# Patient Record
Sex: MALE | Race: WHITE | NOT HISPANIC OR LATINO | Employment: OTHER | URBAN - METROPOLITAN AREA
[De-identification: names, ages, dates, MRNs, and addresses within clinical notes are randomized per-mention and may not be internally consistent; named-entity substitution may affect disease eponyms.]

---

## 2017-01-18 ENCOUNTER — TRANSFERRED RECORDS (OUTPATIENT)
Dept: OTHER | Facility: CLINIC | Age: 67
End: 2017-01-18

## 2017-01-19 ENCOUNTER — HOSPITAL ENCOUNTER (OUTPATIENT)
Dept: ULTRASOUND IMAGING | Facility: CLINIC | Age: 67
Discharge: HOME | End: 2017-01-19
Attending: ORTHOPAEDIC SURGERY | Admitting: ORTHOPAEDIC SURGERY
Payer: MEDICARE

## 2017-01-19 DIAGNOSIS — L81.9 DISCOLORATION OF SKIN OF FOOT: ICD-10-CM

## 2017-01-19 DIAGNOSIS — I73.9 CLAUDICATION (H): ICD-10-CM

## 2017-01-19 DIAGNOSIS — M79.673 FOOT PAIN: ICD-10-CM

## 2017-01-19 PROCEDURE — 93924 LWR XTR VASC STDY BILAT: CPT

## 2017-01-25 ENCOUNTER — TRANSFERRED RECORDS (OUTPATIENT)
Dept: HEALTH INFORMATION MANAGEMENT | Facility: CLINIC | Age: 67
End: 2017-01-25

## 2017-01-30 ENCOUNTER — ANESTHESIA EVENT (OUTPATIENT)
Dept: SURGERY | Facility: CLINIC | Age: 67
DRG: 470 | End: 2017-01-30
Payer: MEDICARE

## 2017-01-30 ENCOUNTER — ANESTHESIA (OUTPATIENT)
Dept: SURGERY | Facility: CLINIC | Age: 67
DRG: 470 | End: 2017-01-30
Payer: MEDICARE

## 2017-01-30 PROBLEM — Z96.651 S/P TOTAL KNEE REPLACEMENT USING CEMENT, RIGHT: Status: ACTIVE | Noted: 2017-01-30

## 2017-01-30 PROCEDURE — 25000128 H RX IP 250 OP 636: Performed by: ANESTHESIOLOGY

## 2017-01-30 PROCEDURE — 25000125 ZZHC RX 250: Performed by: NURSE ANESTHETIST, CERTIFIED REGISTERED

## 2017-01-30 PROCEDURE — 25000128 H RX IP 250 OP 636: Performed by: NURSE ANESTHETIST, CERTIFIED REGISTERED

## 2017-01-30 PROCEDURE — 25000125 ZZHC RX 250: Performed by: ANESTHESIOLOGY

## 2017-01-30 PROCEDURE — 25000128 H RX IP 250 OP 636: Performed by: ORTHOPAEDIC SURGERY

## 2017-01-30 PROCEDURE — 25800025 ZZH RX 258: Performed by: ANESTHESIOLOGY

## 2017-01-30 RX ORDER — PROPOFOL 10 MG/ML
INJECTION, EMULSION INTRAVENOUS PRN
Status: DISCONTINUED | OUTPATIENT
Start: 2017-01-30 | End: 2017-01-30

## 2017-01-30 RX ORDER — LIDOCAINE HYDROCHLORIDE 20 MG/ML
INJECTION, SOLUTION INFILTRATION; PERINEURAL PRN
Status: DISCONTINUED | OUTPATIENT
Start: 2017-01-30 | End: 2017-01-30

## 2017-01-30 RX ORDER — NEOSTIGMINE METHYLSULFATE 1 MG/ML
VIAL (ML) INJECTION PRN
Status: DISCONTINUED | OUTPATIENT
Start: 2017-01-30 | End: 2017-01-30

## 2017-01-30 RX ORDER — ONDANSETRON 2 MG/ML
INJECTION INTRAMUSCULAR; INTRAVENOUS PRN
Status: DISCONTINUED | OUTPATIENT
Start: 2017-01-30 | End: 2017-01-30

## 2017-01-30 RX ORDER — GLYCOPYRROLATE 0.2 MG/ML
INJECTION, SOLUTION INTRAMUSCULAR; INTRAVENOUS PRN
Status: DISCONTINUED | OUTPATIENT
Start: 2017-01-30 | End: 2017-01-30

## 2017-01-30 RX ADMIN — PROPOFOL 20 MG: 10 INJECTION, EMULSION INTRAVENOUS at 14:17

## 2017-01-30 RX ADMIN — ONDANSETRON 4 MG: 2 INJECTION INTRAMUSCULAR; INTRAVENOUS at 14:12

## 2017-01-30 RX ADMIN — NEOSTIGMINE METHYLSULFATE 5 MG: 1 INJECTION INTRAMUSCULAR; INTRAVENOUS; SUBCUTANEOUS at 14:12

## 2017-01-30 RX ADMIN — DEXMEDETOMIDINE 8 MCG: 100 INJECTION, SOLUTION, CONCENTRATE INTRAVENOUS at 14:09

## 2017-01-30 RX ADMIN — FENTANYL CITRATE 100 MCG: 50 INJECTION, SOLUTION INTRAMUSCULAR; INTRAVENOUS at 12:37

## 2017-01-30 RX ADMIN — LIDOCAINE HYDROCHLORIDE 80 MG: 20 INJECTION, SOLUTION INFILTRATION; PERINEURAL at 12:37

## 2017-01-30 RX ADMIN — SODIUM CHLORIDE, POTASSIUM CHLORIDE, SODIUM LACTATE AND CALCIUM CHLORIDE: 600; 310; 30; 20 INJECTION, SOLUTION INTRAVENOUS at 11:17

## 2017-01-30 RX ADMIN — ROCURONIUM BROMIDE 50 MG: 10 INJECTION INTRAVENOUS at 12:38

## 2017-01-30 RX ADMIN — PROPOFOL 200 MG: 10 INJECTION, EMULSION INTRAVENOUS at 12:37

## 2017-01-30 RX ADMIN — Medication 3 G: at 12:45

## 2017-01-30 RX ADMIN — HYDROMORPHONE HYDROCHLORIDE 0.5 MG: 1 INJECTION, SOLUTION INTRAMUSCULAR; INTRAVENOUS; SUBCUTANEOUS at 13:09

## 2017-01-30 RX ADMIN — ROPIVACAINE HYDROCHLORIDE 30 ML GIVEN: 5 INJECTION, SOLUTION EPIDURAL; INFILTRATION; PERINEURAL at 11:11

## 2017-01-30 RX ADMIN — MIDAZOLAM HYDROCHLORIDE 1 MG: 1 INJECTION, SOLUTION INTRAMUSCULAR; INTRAVENOUS at 12:30

## 2017-01-30 RX ADMIN — HYDROMORPHONE HYDROCHLORIDE 0.5 MG: 1 INJECTION, SOLUTION INTRAMUSCULAR; INTRAVENOUS; SUBCUTANEOUS at 13:29

## 2017-01-30 RX ADMIN — GLYCOPYRROLATE 1 MG: 0.2 INJECTION, SOLUTION INTRAMUSCULAR; INTRAVENOUS at 14:11

## 2017-01-30 NOTE — ANESTHESIA PROCEDURE NOTES
Peripheral nerve/Neuraxial procedure note : femoral  Pre-Procedure  Performed by SHARMIN MIDDLETON  Referred by [facility]  Location: pre-op      Pre-Anesthestic Checklist: patient identified, IV checked, site marked, risks and benefits discussed, informed consent, monitors and equipment checked, at physician/surgeon's request and post-op pain management    Timeout  Correct Patient: Yes   Correct Procedure: Yes   Correct Site: Yes   Correct Laterality: Yes   Correct Position: Yes   Site Marked: Yes   .   Procedure Documentation    .    Procedure:    Femoral.  Local skin infiltrated with 3 mL of 1% lidocaine.     Ultrasound used to identify targeted nerve, plexus, or vascular marker and placed a needle adjacent to it. A permanent image is entered into the patient's record.  Patient Prep;mask, sterile gloves, chlorhexidine gluconate and isopropyl alcohol, patient draped.  Nerve Stim: Initial Level 1 mA. Lowest motor response mA..  Needle: insulated, short bevel (20 G. 2 in). .  Spinal Needle: . . .     Assessment/Narrative  Paresthesias: No.  .  The placement was negative for: blood aspirated, painful injection and site bleeding.  Bolus given via needle..   Secured via.   Complications: none. Comments:  Single shot femoral nerve block (adductor)  30 ml 0.5% Ropivacaine with 1:400,000 Epinephrine

## 2017-01-30 NOTE — ANESTHESIA CARE TRANSFER NOTE
Patient: Huey Lucas    ARTHROPLASTY KNEE (Right Knee)  Additional InformationProcedure(s):  RIGHT TOTAL KNEE ARTHROPLASTY  - Wound Class: I-Clean    Diagnosis: RIGHT KNEE OSTEOARTHRITIS   Diagnosis Additional Information: No value filed.    Anesthesia Type:   General, ETT     Note:  Airway :Face Mask  Patient transferred to:PACU  Comments: Spontaneous respirations, tidal volume > 500, oxygen saturation > 97%, TOF 4/4 with > 5 seconds sustained tetany, follows commands.  Suctioned and extubated with cuff down to facemask.  Exchanging well.Transferred to PACU, spontaneous respirations, 10L oxygen via facemask.  All monitors and alarms on and functioning, VSS.  Patient awake, comfortable.  Report to PACU RN.      Vitals: (Last set prior to Anesthesia Care Transfer)              Electronically Signed By: OFELIA Mcdermott CRNA  January 30, 2017  2:30 PM

## 2017-01-30 NOTE — ANESTHESIA PREPROCEDURE EVALUATION
Anesthesia Evaluation     . Pt has had prior anesthetic.     No history of anesthetic complications     ROS/MED HX    ENT/Pulmonary:     (+)sleep apnea, doesn't use CPAP , . .    Neurologic: Comment: Pituitary cyst - resolving, asx      Cardiovascular: Comment: S/p [place] 2009  Mild pulmonary HTN    (+) Dyslipidemia, hypertension----. : . . . :. . pulmonary hypertension,       METS/Exercise Tolerance:  3 - Able to walk 1-2 blocks without stopping   Hematologic:         Musculoskeletal:         GI/Hepatic:        (-) GERD   Renal/Genitourinary:     (+) chronic renal disease, type: CRI, Pt does not require dialysis,       Endo:     (+) type I DM, Using insulin Obesity, .      Psychiatric:     (+) psychiatric history anxiety and depression      Infectious Disease:         Malignancy:         Other:               Physical Exam  Normal systems: dental    Airway   Mallampati: III  TM distance: >3 FB  Neck ROM: full    Dental     Cardiovascular   Rhythm and rate: regular and normal      Pulmonary    breath sounds clear to auscultation                    Anesthesia Plan      History & Physical Review  History and physical reviewed and following examination; no interval change.    ASA Status:  3 .    NPO Status:  > 8 hours    Plan for General and ETT with Intravenous induction. Maintenance will be Balanced.    PONV prophylaxis:  Ondansetron (or other 5HT-3) and Dexamethasone or Solumedrol  Additional equipment: Videolaryngoscope glidescope  glucometer      Postoperative Care  Postoperative pain management:  Peripheral nerve block (Single Shot).      Consents  Anesthetic plan, risks, benefits and alternatives discussed with:  Patient..                          .

## 2017-01-30 NOTE — ANESTHESIA POSTPROCEDURE EVALUATION
Patient: Huey Lucas    ARTHROPLASTY KNEE (Right Knee)  Additional InformationProcedure(s):  RIGHT TOTAL KNEE ARTHROPLASTY  - Wound Class: I-Clean    Diagnosis:RIGHT KNEE OSTEOARTHRITIS   Diagnosis Additional Information: No value filed.    Anesthesia Type:  General, ETT    Note:  Anesthesia Post Evaluation    Patient location during evaluation: PACU  Patient participation: Able to fully participate in evaluation  Level of consciousness: awake  Pain management: adequate  Airway patency: patent  Cardiovascular status: acceptable  Respiratory status: acceptable  Hydration status: acceptable  PONV: none     Anesthetic complications: None          Last vitals:  Filed Vitals:    01/30/17 1430 01/30/17 1440 01/30/17 1450   BP: 127/72 114/72 122/76   Temp:      Resp: 26 22 18   SpO2: 100% 99% 99%       Electronically Signed By: Adrienne Sales MD  January 30, 2017  2:58 PM

## 2017-01-31 ENCOUNTER — APPOINTMENT (OUTPATIENT)
Dept: PHYSICAL THERAPY | Facility: CLINIC | Age: 67
DRG: 470 | End: 2017-01-31
Attending: ORTHOPAEDIC SURGERY
Payer: MEDICARE

## 2017-02-01 ENCOUNTER — APPOINTMENT (OUTPATIENT)
Dept: PHYSICAL THERAPY | Facility: CLINIC | Age: 67
DRG: 470 | End: 2017-02-01
Attending: ORTHOPAEDIC SURGERY
Payer: MEDICARE

## 2017-02-01 ENCOUNTER — APPOINTMENT (OUTPATIENT)
Dept: OCCUPATIONAL THERAPY | Facility: CLINIC | Age: 67
DRG: 470 | End: 2017-02-01
Attending: PHYSICIAN ASSISTANT
Payer: MEDICARE

## 2017-02-02 ENCOUNTER — APPOINTMENT (OUTPATIENT)
Dept: PHYSICAL THERAPY | Facility: CLINIC | Age: 67
DRG: 470 | End: 2017-02-02
Attending: ORTHOPAEDIC SURGERY
Payer: MEDICARE

## 2017-02-02 ENCOUNTER — APPOINTMENT (OUTPATIENT)
Dept: OCCUPATIONAL THERAPY | Facility: CLINIC | Age: 67
DRG: 470 | End: 2017-02-02
Attending: ORTHOPAEDIC SURGERY
Payer: MEDICARE

## 2017-10-09 ENCOUNTER — HOSPITAL ENCOUNTER (OUTPATIENT)
Facility: CLINIC | Age: 67
Discharge: HOME | End: 2017-10-09
Attending: ORTHOPAEDIC SURGERY | Admitting: ORTHOPAEDIC SURGERY
Payer: MEDICARE

## 2017-10-09 ENCOUNTER — SURGERY (OUTPATIENT)
Age: 67
End: 2017-10-09

## 2017-10-09 ENCOUNTER — ANESTHESIA (OUTPATIENT)
Dept: SURGERY | Facility: CLINIC | Age: 67
End: 2017-10-09
Payer: MEDICARE

## 2017-10-09 ENCOUNTER — ANESTHESIA EVENT (OUTPATIENT)
Dept: SURGERY | Facility: CLINIC | Age: 67
End: 2017-10-09
Payer: MEDICARE

## 2017-10-09 VITALS
DIASTOLIC BLOOD PRESSURE: 59 MMHG | HEIGHT: 72 IN | OXYGEN SATURATION: 96 % | BODY MASS INDEX: 34.05 KG/M2 | WEIGHT: 251.4 LBS | HEART RATE: 75 BPM | TEMPERATURE: 97.4 F | SYSTOLIC BLOOD PRESSURE: 116 MMHG | RESPIRATION RATE: 16 BRPM

## 2017-10-09 DIAGNOSIS — Z98.890 S/P FOOT SURGERY: Primary | ICD-10-CM

## 2017-10-09 LAB
GLUCOSE BLDC GLUCOMTR-MCNC: 131 MG/DL (ref 70–99)
GLUCOSE BLDC GLUCOMTR-MCNC: 216 MG/DL (ref 70–99)

## 2017-10-09 PROCEDURE — A9270 NON-COVERED ITEM OR SERVICE: HCPCS | Mod: GY | Performed by: PHYSICIAN ASSISTANT

## 2017-10-09 PROCEDURE — 36000050 ZZH SURGERY LEVEL 2 1ST 30 MIN: Performed by: ORTHOPAEDIC SURGERY

## 2017-10-09 PROCEDURE — 71000027 ZZH RECOVERY PHASE 2 EACH 15 MINS: Performed by: ORTHOPAEDIC SURGERY

## 2017-10-09 PROCEDURE — 25000128 H RX IP 250 OP 636: Performed by: NURSE ANESTHETIST, CERTIFIED REGISTERED

## 2017-10-09 PROCEDURE — 71000013 ZZH RECOVERY PHASE 1 LEVEL 1 EA ADDTL HR: Performed by: ORTHOPAEDIC SURGERY

## 2017-10-09 PROCEDURE — 25000128 H RX IP 250 OP 636: Performed by: ANESTHESIOLOGY

## 2017-10-09 PROCEDURE — 27210995 ZZH RX 272: Performed by: ORTHOPAEDIC SURGERY

## 2017-10-09 PROCEDURE — 25000128 H RX IP 250 OP 636: Performed by: PHYSICIAN ASSISTANT

## 2017-10-09 PROCEDURE — 40000170 ZZH STATISTIC PRE-PROCEDURE ASSESSMENT II: Performed by: ORTHOPAEDIC SURGERY

## 2017-10-09 PROCEDURE — 27210794 ZZH OR GENERAL SUPPLY STERILE: Performed by: ORTHOPAEDIC SURGERY

## 2017-10-09 PROCEDURE — 36000052 ZZH SURGERY LEVEL 2 EA 15 ADDTL MIN: Performed by: ORTHOPAEDIC SURGERY

## 2017-10-09 PROCEDURE — 71000012 ZZH RECOVERY PHASE 1 LEVEL 1 FIRST HR: Performed by: ORTHOPAEDIC SURGERY

## 2017-10-09 PROCEDURE — 25000125 ZZHC RX 250: Performed by: NURSE ANESTHETIST, CERTIFIED REGISTERED

## 2017-10-09 PROCEDURE — 37000008 ZZH ANESTHESIA TECHNICAL FEE, 1ST 30 MIN: Performed by: ORTHOPAEDIC SURGERY

## 2017-10-09 PROCEDURE — 37000009 ZZH ANESTHESIA TECHNICAL FEE, EACH ADDTL 15 MIN: Performed by: ORTHOPAEDIC SURGERY

## 2017-10-09 PROCEDURE — 27110028 ZZH OR GENERAL SUPPLY NON-STERILE: Performed by: ORTHOPAEDIC SURGERY

## 2017-10-09 PROCEDURE — 25000566 ZZH SEVOFLURANE, EA 15 MIN: Performed by: ORTHOPAEDIC SURGERY

## 2017-10-09 PROCEDURE — 82962 GLUCOSE BLOOD TEST: CPT | Mod: 91

## 2017-10-09 PROCEDURE — 25000132 ZZH RX MED GY IP 250 OP 250 PS 637: Mod: GY | Performed by: PHYSICIAN ASSISTANT

## 2017-10-09 RX ORDER — NALOXONE HYDROCHLORIDE 0.4 MG/ML
.1-.4 INJECTION, SOLUTION INTRAMUSCULAR; INTRAVENOUS; SUBCUTANEOUS
Status: DISCONTINUED | OUTPATIENT
Start: 2017-10-09 | End: 2017-10-09 | Stop reason: HOSPADM

## 2017-10-09 RX ORDER — SODIUM CHLORIDE, SODIUM LACTATE, POTASSIUM CHLORIDE, CALCIUM CHLORIDE 600; 310; 30; 20 MG/100ML; MG/100ML; MG/100ML; MG/100ML
INJECTION, SOLUTION INTRAVENOUS CONTINUOUS PRN
Status: DISCONTINUED | OUTPATIENT
Start: 2017-10-09 | End: 2017-10-09

## 2017-10-09 RX ORDER — HYDROMORPHONE HYDROCHLORIDE 1 MG/ML
.3-.5 INJECTION, SOLUTION INTRAMUSCULAR; INTRAVENOUS; SUBCUTANEOUS EVERY 10 MIN PRN
Status: DISCONTINUED | OUTPATIENT
Start: 2017-10-09 | End: 2017-10-09 | Stop reason: HOSPADM

## 2017-10-09 RX ORDER — ONDANSETRON 2 MG/ML
INJECTION INTRAMUSCULAR; INTRAVENOUS PRN
Status: DISCONTINUED | OUTPATIENT
Start: 2017-10-09 | End: 2017-10-09

## 2017-10-09 RX ORDER — CEFAZOLIN SODIUM 1 G/3ML
1 INJECTION, POWDER, FOR SOLUTION INTRAMUSCULAR; INTRAVENOUS SEE ADMIN INSTRUCTIONS
Status: DISCONTINUED | OUTPATIENT
Start: 2017-10-09 | End: 2017-10-09 | Stop reason: HOSPADM

## 2017-10-09 RX ORDER — FENTANYL CITRATE 50 UG/ML
INJECTION, SOLUTION INTRAMUSCULAR; INTRAVENOUS PRN
Status: DISCONTINUED | OUTPATIENT
Start: 2017-10-09 | End: 2017-10-09

## 2017-10-09 RX ORDER — ROPIVACAINE HYDROCHLORIDE 5 MG/ML
INJECTION, SOLUTION EPIDURAL; INFILTRATION; PERINEURAL PRN
Status: DISCONTINUED | OUTPATIENT
Start: 2017-10-09 | End: 2017-10-09

## 2017-10-09 RX ORDER — IBUPROFEN 600 MG/1
600 TABLET, FILM COATED ORAL EVERY 6 HOURS PRN
Qty: 30 TABLET | Refills: 0 | Status: ON HOLD | OUTPATIENT
Start: 2017-10-09 | End: 2019-03-04

## 2017-10-09 RX ORDER — PHYSOSTIGMINE SALICYLATE 1 MG/ML
1.2 INJECTION INTRAVENOUS
Status: DISCONTINUED | OUTPATIENT
Start: 2017-10-09 | End: 2017-10-09 | Stop reason: HOSPADM

## 2017-10-09 RX ORDER — CLINDAMYCIN HCL 150 MG
150 CAPSULE ORAL 3 TIMES DAILY
Qty: 30 CAPSULE | Refills: 0 | Status: ON HOLD | OUTPATIENT
Start: 2017-10-09 | End: 2019-03-04

## 2017-10-09 RX ORDER — ONDANSETRON 2 MG/ML
4 INJECTION INTRAMUSCULAR; INTRAVENOUS EVERY 30 MIN PRN
Status: DISCONTINUED | OUTPATIENT
Start: 2017-10-09 | End: 2017-10-09 | Stop reason: HOSPADM

## 2017-10-09 RX ORDER — FENTANYL CITRATE 50 UG/ML
25-50 INJECTION, SOLUTION INTRAMUSCULAR; INTRAVENOUS
Status: DISCONTINUED | OUTPATIENT
Start: 2017-10-09 | End: 2017-10-09 | Stop reason: HOSPADM

## 2017-10-09 RX ORDER — ONDANSETRON 4 MG/1
4 TABLET, ORALLY DISINTEGRATING ORAL EVERY 30 MIN PRN
Status: DISCONTINUED | OUTPATIENT
Start: 2017-10-09 | End: 2017-10-09 | Stop reason: HOSPADM

## 2017-10-09 RX ORDER — OXYCODONE AND ACETAMINOPHEN 10; 325 MG/1; MG/1
1-2 TABLET ORAL
Status: COMPLETED | OUTPATIENT
Start: 2017-10-09 | End: 2017-10-09

## 2017-10-09 RX ORDER — CEFAZOLIN SODIUM 2 G/100ML
2 INJECTION, SOLUTION INTRAVENOUS
Status: COMPLETED | OUTPATIENT
Start: 2017-10-09 | End: 2017-10-09

## 2017-10-09 RX ORDER — SODIUM CHLORIDE, SODIUM LACTATE, POTASSIUM CHLORIDE, CALCIUM CHLORIDE 600; 310; 30; 20 MG/100ML; MG/100ML; MG/100ML; MG/100ML
INJECTION, SOLUTION INTRAVENOUS CONTINUOUS
Status: DISCONTINUED | OUTPATIENT
Start: 2017-10-09 | End: 2017-10-09 | Stop reason: HOSPADM

## 2017-10-09 RX ORDER — LIDOCAINE HYDROCHLORIDE 20 MG/ML
INJECTION, SOLUTION INFILTRATION; PERINEURAL PRN
Status: DISCONTINUED | OUTPATIENT
Start: 2017-10-09 | End: 2017-10-09

## 2017-10-09 RX ORDER — ONDANSETRON 4 MG/1
4 TABLET, ORALLY DISINTEGRATING ORAL
Status: DISCONTINUED | OUTPATIENT
Start: 2017-10-09 | End: 2017-10-09 | Stop reason: HOSPADM

## 2017-10-09 RX ORDER — OXYCODONE AND ACETAMINOPHEN 10; 325 MG/1; MG/1
1-2 TABLET ORAL EVERY 4 HOURS PRN
Qty: 30 TABLET | Refills: 0 | Status: ON HOLD | OUTPATIENT
Start: 2017-10-09 | End: 2019-03-04

## 2017-10-09 RX ORDER — FENTANYL CITRATE 50 UG/ML
25-50 INJECTION, SOLUTION INTRAMUSCULAR; INTRAVENOUS EVERY 5 MIN PRN
Status: DISCONTINUED | OUTPATIENT
Start: 2017-10-09 | End: 2017-10-09 | Stop reason: HOSPADM

## 2017-10-09 RX ORDER — PROPOFOL 10 MG/ML
INJECTION, EMULSION INTRAVENOUS PRN
Status: DISCONTINUED | OUTPATIENT
Start: 2017-10-09 | End: 2017-10-09

## 2017-10-09 RX ORDER — KETOROLAC TROMETHAMINE 10 MG/1
10 TABLET, FILM COATED ORAL 3 TIMES DAILY
Qty: 15 TABLET | Refills: 0 | Status: ON HOLD | OUTPATIENT
Start: 2017-10-09 | End: 2019-03-04

## 2017-10-09 RX ORDER — MAGNESIUM HYDROXIDE 1200 MG/15ML
LIQUID ORAL PRN
Status: DISCONTINUED | OUTPATIENT
Start: 2017-10-09 | End: 2017-10-09 | Stop reason: HOSPADM

## 2017-10-09 RX ORDER — PROPOFOL 10 MG/ML
INJECTION, EMULSION INTRAVENOUS CONTINUOUS PRN
Status: DISCONTINUED | OUTPATIENT
Start: 2017-10-09 | End: 2017-10-09

## 2017-10-09 RX ORDER — OXYCODONE AND ACETAMINOPHEN 10; 325 MG/1; MG/1
1 TABLET ORAL EVERY 6 HOURS PRN
Qty: 12 TABLET | Refills: 0 | Status: ON HOLD | OUTPATIENT
Start: 2017-10-09 | End: 2019-03-04

## 2017-10-09 RX ORDER — AMOXICILLIN 250 MG
1-2 CAPSULE ORAL 2 TIMES DAILY
Qty: 30 TABLET | Refills: 0 | Status: ON HOLD | OUTPATIENT
Start: 2017-10-09 | End: 2019-03-04

## 2017-10-09 RX ORDER — KETOROLAC TROMETHAMINE 30 MG/ML
INJECTION, SOLUTION INTRAMUSCULAR; INTRAVENOUS PRN
Status: DISCONTINUED | OUTPATIENT
Start: 2017-10-09 | End: 2017-10-09

## 2017-10-09 RX ORDER — MEPERIDINE HYDROCHLORIDE 25 MG/ML
12.5 INJECTION INTRAMUSCULAR; INTRAVENOUS; SUBCUTANEOUS
Status: DISCONTINUED | OUTPATIENT
Start: 2017-10-09 | End: 2017-10-09 | Stop reason: HOSPADM

## 2017-10-09 RX ADMIN — PHENYLEPHRINE HYDROCHLORIDE 100 MCG: 10 INJECTION, SOLUTION INTRAMUSCULAR; INTRAVENOUS; SUBCUTANEOUS at 11:14

## 2017-10-09 RX ADMIN — OXYCODONE HYDROCHLORIDE AND ACETAMINOPHEN 1 TABLET: 10; 325 TABLET ORAL at 12:44

## 2017-10-09 RX ADMIN — FENTANYL CITRATE 50 MCG: 50 INJECTION, SOLUTION INTRAMUSCULAR; INTRAVENOUS at 11:56

## 2017-10-09 RX ADMIN — SODIUM CHLORIDE 950 ML: 0.9 IRRIGANT IRRIGATION at 11:08

## 2017-10-09 RX ADMIN — FENTANYL CITRATE 50 MCG: 50 INJECTION, SOLUTION INTRAMUSCULAR; INTRAVENOUS at 10:49

## 2017-10-09 RX ADMIN — PHENYLEPHRINE HYDROCHLORIDE 100 MCG: 10 INJECTION, SOLUTION INTRAMUSCULAR; INTRAVENOUS; SUBCUTANEOUS at 10:50

## 2017-10-09 RX ADMIN — PHENYLEPHRINE HYDROCHLORIDE 100 MCG: 10 INJECTION, SOLUTION INTRAMUSCULAR; INTRAVENOUS; SUBCUTANEOUS at 11:11

## 2017-10-09 RX ADMIN — FENTANYL CITRATE 50 MCG: 50 INJECTION, SOLUTION INTRAMUSCULAR; INTRAVENOUS at 11:34

## 2017-10-09 RX ADMIN — FENTANYL CITRATE 50 MCG: 50 INJECTION, SOLUTION INTRAMUSCULAR; INTRAVENOUS at 11:43

## 2017-10-09 RX ADMIN — PHENYLEPHRINE HYDROCHLORIDE 100 MCG: 10 INJECTION, SOLUTION INTRAMUSCULAR; INTRAVENOUS; SUBCUTANEOUS at 10:54

## 2017-10-09 RX ADMIN — LIDOCAINE HYDROCHLORIDE 60 MG: 20 INJECTION, SOLUTION INFILTRATION; PERINEURAL at 10:49

## 2017-10-09 RX ADMIN — ROPIVACAINE HYDROCHLORIDE 20 ML: 5 INJECTION, SOLUTION EPIDURAL; INFILTRATION; PERINEURAL at 10:56

## 2017-10-09 RX ADMIN — KETOROLAC TROMETHAMINE 15 MG: 30 INJECTION, SOLUTION INTRAMUSCULAR at 11:13

## 2017-10-09 RX ADMIN — PHENYLEPHRINE HYDROCHLORIDE 100 MCG: 10 INJECTION, SOLUTION INTRAMUSCULAR; INTRAVENOUS; SUBCUTANEOUS at 10:57

## 2017-10-09 RX ADMIN — PHENYLEPHRINE HYDROCHLORIDE 200 MCG: 10 INJECTION, SOLUTION INTRAMUSCULAR; INTRAVENOUS; SUBCUTANEOUS at 11:02

## 2017-10-09 RX ADMIN — PROPOFOL 200 MG: 10 INJECTION, EMULSION INTRAVENOUS at 10:49

## 2017-10-09 RX ADMIN — PROPOFOL 150 MCG/KG/MIN: 10 INJECTION, EMULSION INTRAVENOUS at 10:49

## 2017-10-09 RX ADMIN — MIDAZOLAM HYDROCHLORIDE 2 MG: 1 INJECTION, SOLUTION INTRAMUSCULAR; INTRAVENOUS at 10:49

## 2017-10-09 RX ADMIN — FENTANYL CITRATE 50 MCG: 50 INJECTION, SOLUTION INTRAMUSCULAR; INTRAVENOUS at 12:13

## 2017-10-09 RX ADMIN — PHENYLEPHRINE HYDROCHLORIDE 200 MCG: 10 INJECTION, SOLUTION INTRAMUSCULAR; INTRAVENOUS; SUBCUTANEOUS at 11:07

## 2017-10-09 RX ADMIN — ONDANSETRON 4 MG: 2 INJECTION INTRAMUSCULAR; INTRAVENOUS at 10:58

## 2017-10-09 RX ADMIN — CEFAZOLIN SODIUM 2 G: 2 INJECTION, SOLUTION INTRAVENOUS at 10:50

## 2017-10-09 RX ADMIN — SODIUM CHLORIDE, POTASSIUM CHLORIDE, SODIUM LACTATE AND CALCIUM CHLORIDE: 600; 310; 30; 20 INJECTION, SOLUTION INTRAVENOUS at 10:49

## 2017-10-09 RX ADMIN — FENTANYL CITRATE 50 MCG: 50 INJECTION, SOLUTION INTRAMUSCULAR; INTRAVENOUS at 10:53

## 2017-10-09 RX ADMIN — PHENYLEPHRINE HYDROCHLORIDE 100 MCG: 10 INJECTION, SOLUTION INTRAMUSCULAR; INTRAVENOUS; SUBCUTANEOUS at 10:52

## 2017-10-09 ASSESSMENT — LIFESTYLE VARIABLES: TOBACCO_USE: 1

## 2017-10-09 NOTE — OP NOTE
DATE OF PROCEDURE:  10/09/2017      PREOPERATIVE DIAGNOSIS:  Impingement between the tibia and talus medial gutter, right ankle.      POSTOPERATIVE DIAGNOSIS:  Impingement between the tibia and talus medial gutter, right ankle.      PROCEDURE:  Open cheilectomy and debridement of the right ankle talus and tibia.      SURGEON:  Kim Martinez MD      ASSISTANT:   JOAO BHATTI      ANESTHESIA:  General.      PREAMBLE:  Mr. Huey Lucas previously had an ankle replacement done.  Prior to that he had a triple arthrodesis.  He has a very stiff foot that tends to make osteophytes over time.      At the moment he has significant anteromedial impingement in his ankle due to a large osteophytes on the talus.      Informed consent was obtained for the above-mentioned procedure.      Two grams of Ancef was given prior to surgery.  There is no need for postoperative antibiotic prophylaxis.      DESCRIPTION OF PROCEDURE:  After adequate induction of a general anesthetic, the patient was positioned supine on the operating table.  The right leg was sterilely prepped and free draped in the usual fashion.  Tourniquet around the thigh was inflated to 300 mmHg.      A 5 cm curvilinear incision was made along the medial malleolus anterior aspect.  The tibiotalar joint was exposed.  There were very large osteophytes on the talus over the medial side of the joint as well as the medial malleolus.  An osteotome was done to do a generous cheilectomy to create completely free motion between the talus and tibia.  A fairly large amount of bone was removed.      The ankle joint and the replacement itself still looked excellent.      The tourniquet was deflated.  The wound closed in layers.  A sterile dressing and a light compressive bandage applied.  He tolerated the procedure well and was taken to the recovery room in satisfactory condition.      He can ambulate weightbearing as tolerated.  Sutures will be removed in 2 weeks.          ANTWAN VICTOR MD             D: 10/09/2017 11:22   T: 10/09/2017 16:30   MT: [id number]      Name:     MARIE ARREDONDO   MRN:      [id number]        Account:        [id number]   :      1950           Procedure Date: 10/09/2017      Document: [id number]

## 2017-10-09 NOTE — IP AVS SNAPSHOT
MRN:[id number]                      After Visit Summary   10/9/2017    Huey Lucas    MRN: [id number]           Thank you!     Thank you for choosing [facility] for your care. Our goal is always to provide you with excellent care. Hearing back from our patients is one way we can continue to improve our services. Please take a few minutes to complete the written survey that you may receive in the mail after you visit with us. Thank you!        Patient Information     Date Of Birth          1950        About your hospital stay     You were admitted on:  October 9, 2017 [facility]    You were discharged on:  October 9, 2017       Who to Call     For medical emergencies, please call 911.  For non-urgent questions about your medical care, please call your primary care provider or clinic, [phone]  For questions related to your surgery, please call your surgery clinic        Attending Provider     Provider Kim Kuo MD Orthopaedic Surgery       Primary Care Provider Office Phone # Fax #    Higinio Green [phone]      After Care Instructions     Activity       Ambulate with assistance until independent            Discharge Instructions       Review discharge instructions as directed by Provider.            Discharge Instructions       Patient to arrange for return to clinic appointment in two weeks.            Elevate affected extremity           Ice to affected area       Ice pack to affected area PRN (as needed).            No dressing change       until follow up clinic appointment.            No driving or operating machinery       until the day after procedure            Weight bearing status - Partial                 Further instructions from your care team       If your bladder area becomes painful and distended and you are unable to urinate then call Dr Martinez's office and report to  hospital Emergency Room      Same Day Surgery Discharge Instructions for  Sedation and General Anesthesia       It's not unusual to feel dizzy, light-headed or faint for up to 24 hours after surgery or while taking pain medication.  If you have these symptoms: sit for a few minutes before standing and have someone assist you when you get up to walk or use the bathroom.      You should rest and relax for the next 24 hours. We recommend you make arrangements to have an adult stay with you for at least 24 hours after your discharge.  Avoid hazardous and strenuous activity.      DO NOT DRIVE any vehicle or operate mechanical equipment for 24 hours following the end of your surgery.  Even though you may feel normal, your reactions may be affected by the medication you have received.      Do not drink alcoholic beverages for 24 hours following surgery.       Slowly progress to your regular diet as you feel able. It's not unusual to feel nauseated and/or vomit after receiving anesthesia.  If you develop these symptoms, drink clear liquids (apple juice, ginger ale, broth, 7-up, etc. ) until you feel better.  If your nausea and vomiting persists for 24 hours, please notify your surgeon.        All narcotic pain medications, along with inactivity and anesthesia, can cause constipation. Drinking plenty of liquids and increasing fiber intake will help.      For any questions of a medical nature, call your surgeon.      Do not make important decisions for 24 hours.      If you had general anesthesia, you may have a sore throat for a couple of days related to the breathing tube used during surgery.  You may use Cepacol lozenges to help with this discomfort.  If it worsens or if you develop a fever, contact your surgeon.       If you feel your pain is not well managed with the pain medications prescribed by your surgeon, please contact your surgeon's office to let them know so they can address your concerns.         While you were  at the hospital today you received Toradol, an antiinflammatory medication similar to Ibuprofen.  You should not take other antiinflammatory medication, such as Toradol, Ibuprofen, Motrin, Advil, Aleve, Naprosyn, etc, until 5:15PM.        POST-OPERATIVE INSTRUCTIONS  FOOT AND ANKLE SURGERY  RAYRAY Martinez M.D.  Bennett Castaneda P.A.-C    These precautions MUST be followed for the first 24 hours after surgery:    Upon discharge, go directly home.    You must make arrangements to have a responsible adult stay with you.    DO NOT DRINK ALCOHOLIC BEVERAGES    It is not unusual to feel lightheaded up to 24 hours after surgery or while taking pain medication.  If you feel lightheaded, sit for a few minutes before standing and have someone assisted you when you get up to walk or use the restroom.    Do not use any mechanical equipment.    Do not make any important or legal decisions for 24 hours or while on pain medications.    You may experience dry mouth, sore throat, or sleep disturbances from the anesthesia and medications used during surgery.  Generalized muscle aches can sometimes occur.  These usually disappear in 12-24 hours.    POST-OPERATIVE CARE GUIDELINES    The following are general guidelines about what to expect the first days/weeks after surgery.  They are not specific, and your recovery may be slightly different.    Blood clots are not common, but are emergencies.  If you have sudden onset pain in your calf or leg, or have sudden shortness of breath, go to the Emergency Department.      Elevation of your operative foot/ankle is key to reducing the swelling in the immediate post-operative phase (first 3-5 days).  When you are at rest, elevate your foot at or above the level of your heart.  When sitting, your foot should be elevated on a chair or stool; not hanging down.      You should plan to rest the day after surgery no matter how minor the procedure.  More complicated procedures will require more time  to return to normal activity.      Foot and ankle surgery is painful in most cases - use your medication as directed for the first 24 hours after surgery.  It is not unusual for the pain to be worse a day or two after surgery than on the day of.  If your pain is more than 8/10 contact our office.  If you don t have pain, gradually decrease your pain meds by substituting Tylenol.  Please don t use Advil/ibuprofen unless we order it for you.      You will be prescribed Percocet or Vicodin for pain, Vistaril for spasms/pain adjunct, Senokot for constipation.  If you have had trouble taking these meds before or experience nausea or vomiting after surgery from your medication, please advise the recovery room nurses.  If you are already at home, try drinking only clear liquids and/or call our office.      All pain medications, along with inactivity can cause constipation.  Use the Senakot as directed, increase fluid intake to 1 quart per day and increase your dietary fiber.  (The  P  fruits - peaches, plums, pears, and prunes as well as anise/black licorice are recommended.)    It is not unusual to run a low-grade fever after surgery.  If your temperature is elevated above 102 , lasts longer than 24 hours, or is questionable in any way, contact our office.      Drainage from your cast/dressing is normal.  Reinforce your cast/dressing with 4x4 dressings and cover with an Ace wrap.  Wait until 24 hours after surgery to change your dressings; by this time most of your bleeding will have stopped.  If you have drainage through your dressings 2 days after surgery, contact our office.      You cast/dressing will be changed at your 2-week follow up appointment.  They should be kept clean and DRY.  If your cast/dressing is damaged before that, contact our office.      It is normal to experience some bruising in the toes after surgery and they may appear  dark  when your foot hangs down.  It is important to actively wiggle your  toes for at least 5-10 minutes each hour UNLESS you had surgery on those toes.      Use ice on your foot/ankle over the dressing/cast for 20 minutes per hour, 10 or more times per day.  A large bag of frozen peas works well.      Bathing: take a tub or sponge bath instead of a shower if possible during the first two weeks.  If you choose to shower, cover the dressing/cast with a waterproof covering, these may be ordered from www.seal-tight.com or are available at some pharmacies/medical supply stores.  Another option is XeroSox, which is the original vacuum sealed bandage and cast cover.  The cast cover has a vacuum seal and is absolutely waterproof.  4-212-374-2992 or www.xerosox.[employer] for more information.      Driving:  For surgery on the left foot/ankle, you may drive as soon as narcotic medications are stopped.  For surgery on the right foot/ankle, you may drive when you are out of a cast, off pain medications, and you feel secure with braking.      In general, listen to your foot/ankle.  If you have a sudden, dramatic increase in pain that does not resolve after an hour or so, something is wrong - call our office.  If you fall or bump your foot/ankle and have sudden pain that resolves, give it 24 hours before you call.      Many of your questions can be addressed at your 2-week follow-up appointment - please make a list of things to ask us as they come up during your recovery.      If you had a nerve block it is common to have numbness in your leg and foot for up to 30 hours after surgery.  If your leg or foot is still numb more than 30 hours after surgery, please call the office.      FUTURE DENTIST VISITS:  If you have had a total ankle arthroplasty please be advised you must be on antibiotics prior to ANY dental work.  Please call your dentist office ahead of time and make them aware of this as your dentist will be able to order the prescription.  If you have had any other surgery this is not a concern.    If  "you have any further questions or concerns, please call our office at [phone]              Pending Results     No orders found from 10/7/2017 to 10/10/2017.            Statement of Approval     Ordered          10/09/17 1053  I have reviewed and agree with all the recommendations and orders detailed in this document.  EFFECTIVE NOW     Approved and electronically signed by:  Amrit Castaneda PA-C             Admission Information     Date & Time Provider Department Dept. Phone    10/9/2017 Kim Martinez MD [facility] [phone]      Your Vitals Were     Blood Pressure Pulse Temperature Respirations Height Weight    116/59 75 97.4  F (36.3  C) (Temporal) 16 1.829 m (6') 114 kg (251 lb 6.4 oz)    Pulse Oximetry BMI (Body Mass Index)                96% 34.1 kg/m2          MyChart Information     [employer] lets you send messages to your doctor, view your test results, renew your prescriptions, schedule appointments and more. To sign up, go to [email or web address] . Click on \"Log in\" on the left side of the screen, which will take you to the Welcome page. Then click on \"Sign up Now\" on the right side of the page.     You will be asked to enter the access code listed below, as well as some personal information. Please follow the directions to create your username and password.     Your access code is: [email or web address]  Expires: 2018 11:37 AM     Your access code will  in 90 days. If you need help or a new code, please call your [facility] clinic or [phone].        Care EveryWhere ID     This is your Care EveryWhere ID. This could be used by other organizations to access your [facility] medical records  [id number]        Equal Access to Services     GEO MULLER : Logan Cason, kirstie cuevas, dilshad reed. So [facility] [phone].    ATENCIÓN: Si habla español, tiene a hubbard disposición servicios " lenka de asistencia lingüística. Taye sultana [phone].    We comply with applicable federal civil rights laws and [place] laws. We do not discriminate on the basis of race, color, national origin, age, disability, sex, sexual orientation, or gender identity.               Review of your medicines      START taking        Dose / Directions    clindamycin 150 MG capsule   Commonly known as:  CLEOCIN   Used for:  S/P foot surgery        Dose:  150 mg   Take 1 capsule (150 mg) by mouth 3 times daily   Quantity:  30 capsule   Refills:  0       ibuprofen 600 MG tablet   Commonly known as:  ADVIL/MOTRIN   Used for:  S/P foot surgery   Notes to Patient:  Do not take ibuprofen if you are taking toradol        Dose:  600 mg   Take 1 tablet (600 mg) by mouth every 6 hours as needed for pain (mild)   Quantity:  30 tablet   Refills:  0       ketorolac 10 MG tablet   Commonly known as:  TORADOL   Used for:  S/P foot surgery   Notes to Patient:  While you were at the hospital today you received Toradol, an antiinflammatory medication similar to Ibuprofen.  You should not take other antiinflammatory medication toradol, until 5:15 PM        Dose:  10 mg   Take 1 tablet (10 mg) by mouth 3 times daily   Quantity:  15 tablet   Refills:  0       * oxyCODONE-acetaminophen  MG per tablet   Commonly known as:  PERCOCET   Used for:  S/P foot surgery        Dose:  1-2 tablet   Take 1-2 tablets by mouth every 4 hours as needed for pain (moderate to severe)   Quantity:  30 tablet   Refills:  0       * oxyCODONE-acetaminophen  MG per tablet   Commonly known as:  PERCOCET   Used for:  S/P foot surgery   Notes to Patient:  One percocet given at 12:44 PM        Dose:  1 tablet   Take 1 tablet by mouth every 6 hours as needed for moderate to severe pain maximum 4 tablet(s) per day   Quantity:  12 tablet   Refills:  0       senna-docusate 8.6-50 MG per tablet   Commonly known as:  SENOKOT-S;PERICOLACE   Used for:  S/P foot surgery         Dose:  1-2 tablet   Take 1-2 tablets by mouth 2 times daily Take while on oral narcotics to prevent or treat constipation.   Quantity:  30 tablet   Refills:  0       * Notice:  This list has 2 medication(s) that are the same as other medications prescribed for you. Read the directions carefully, and ask your doctor or other care provider to review them with you.      CONTINUE these medicines which have NOT CHANGED        Dose / Directions    acetaminophen 325 MG tablet   Commonly known as:  TYLENOL   Used for:  S/P total knee replacement using cement, right        Dose:  975 mg   Take 3 tablets (975 mg) by mouth every 8 hours   Quantity:  100 tablet   Refills:  0       ASPIRIN PO        Dose:  81 mg   Take 81 mg by mouth daily   Refills:  0       insulin glargine 100 UNIT/ML injection   Commonly known as:  LANTUS        Dose:  50 Units   Inject 50 Units Subcutaneous every morning   Refills:  0       LISINOPRIL PO        Dose:  20 mg   Take 20 mg by mouth daily   Refills:  0       NovoLOG FLEXPEN 100 UNIT/ML injection   Generic drug:  insulin aspart        Dose:  20 Units   Inject 20 Units Subcutaneous 3 times daily (with meals) Base- 20 Units per meal Add 3 units for every 50 over 150 Blood Glucose reading If Blood Glucose is : 151-199 = 20 units + 3 = 23 Units 200-249 = 20 Units + 6 = 26 Units 250-299 = 20 units + 9 = 29 Units   Refills:  0       order for DME   Used for:  S/P total knee replacement using cement, right        Equipment being ordered: Walker Wheels () and Walker () Treatment Diagnosis: impaired gait   Quantity:  1 each   Refills:  0       SIMVASTATIN PO        Dose:  20 mg   Take 20 mg by mouth At Bedtime   Refills:  0       venlafaxine 75 MG Tb24 24 hr tablet   Commonly known as:  EFFEXOR-ER        Dose:  150 mg   Take 150 mg by mouth daily   Refills:  0       VIAGRA PO        Dose:  50 mg   Take 50 mg by mouth daily as needed   Refills:  0       WELLBUTRIN XL PO        Dose:  150  mg   Take 150 mg by mouth daily   Refills:  0       ZOLPIDEM TARTRATE PO        Dose:  10 mg   Take 10 mg by mouth nightly as needed for sleep   Refills:  0            Where to get your medicines      These medications were sent to [facility] [address]  [address] [place] [address]     Phone:  [phone]     clindamycin 150 MG capsule    ibuprofen 600 MG tablet    ketorolac 10 MG tablet    senna-docusate 8.6-50 MG per tablet         Some of these will need a paper prescription and others can be bought over the counter. Ask your nurse if you have questions.     Bring a paper prescription for each of these medications     oxyCODONE-acetaminophen  MG per tablet    oxyCODONE-acetaminophen  MG per tablet               ANTIBIOTIC INSTRUCTION     You've Been Prescribed an Antibiotic - Now What?  Your healthcare team thinks that you or your loved one might have an infection. Some infections can be treated with antibiotics, which are powerful, life-saving drugs. Like all medications, antibiotics have side effects and should only be used when necessary. There are some important things you should know about your antibiotic treatment.      Your healthcare team may run tests before you start taking an antibiotic.    Your team may take samples (e.g., from your blood, urine or other areas) to run tests to look for bacteria. These test can be important to determine if you need an antibiotic at all and, if you do, which antibiotic will work best.      Within a few days, your healthcare team might change or even stop your antibiotic.    Your team may start you on an antibiotic while they are working to find out what is making you sick.    Your team might change your antibiotic because test results show that a different antibiotic would be better to treat your infection.    In some cases, once your team has more information, they learn that you do not need an antibiotic at  all. They may find out that you don't have an infection, or that the antibiotic you're taking won't work against your infection. For example, an infection caused by a virus can't be treated with antibiotics. Staying on an antibiotic when you don't need it is more likely to be harmful than helpful.      You may experience side effects from your antibiotic.    Like all medications, antibiotics have side effects. Some of these can be serious.    Let you healthcare team know if you have any known allergies when you are admitted to the hospital.    One significant side effect of nearly all antibiotics is the risk of severe and sometimes deadly diarrhea caused by Clostridium difficile (C. Difficile). This occurs when a person takes antibiotics because some good germs are destroyed. Antibiotic use allows C. diificile to take over, putting patients at high risk for this serious infection.    As a patient or caregiver, it is important to understand your or your loved one's antibiotic treatment. It is especially important for caregivers to speak up when patients can't speak for themselves. Here are some important questions to ask your healthcare team.    What infection is this antibiotic treating and how do you know I have that infection?    What side effects might occur from this antibiotic?    How long will I need to take this antibiotic?    Is it safe to take this antibiotic with other medications or supplements (e.g., vitamins) that I am taking?     Are there any special directions I need to know about taking this antibiotic? For example, should I take it with food?    How will I be monitored to know whether my infection is responding to the antibiotic?    What tests may help to make sure the right antibiotic is prescribed for me?      Information provided by:  www.cdc.gov/getsmart  U.S. Department of Health and Human Services  Centers for disease Control and Prevention  National Center for Emerging and Zoonotic  Infectious Diseases  Division of Healthcare Quality Promotion         Protect others around you: Learn how to safely use, store and throw away your medicines at www.disposemymeds.org.             Medication List: This is a list of all your medications and when to take them. Check marks below indicate your daily home schedule. Keep this list as a reference.      Medications           Morning Afternoon Evening Bedtime As Needed    acetaminophen 325 MG tablet   Commonly known as:  TYLENOL   Take 3 tablets (975 mg) by mouth every 8 hours                                ASPIRIN PO   Take 81 mg by mouth daily                                clindamycin 150 MG capsule   Commonly known as:  CLEOCIN   Take 1 capsule (150 mg) by mouth 3 times daily                                ibuprofen 600 MG tablet   Commonly known as:  ADVIL/MOTRIN   Take 1 tablet (600 mg) by mouth every 6 hours as needed for pain (mild)   Notes to Patient:  Do not take ibuprofen if you are taking toradol                                insulin glargine 100 UNIT/ML injection   Commonly known as:  LANTUS   Inject 50 Units Subcutaneous every morning                                ketorolac 10 MG tablet   Commonly known as:  TORADOL   Take 1 tablet (10 mg) by mouth 3 times daily   Notes to Patient:  While you were at the hospital today you received Toradol, an antiinflammatory medication similar to Ibuprofen.  You should not take other antiinflammatory medication toradol, until 5:15 PM                                LISINOPRIL PO   Take 20 mg by mouth daily                                NovoLOG FLEXPEN 100 UNIT/ML injection   Inject 20 Units Subcutaneous 3 times daily (with meals) Base- 20 Units per meal Add 3 units for every 50 over 150 Blood Glucose reading If Blood Glucose is : 151-199 = 20 units + 3 = 23 Units 200-249 = 20 Units + 6 = 26 Units 250-299 = 20 units + 9 = 29 Units   Generic drug:  insulin aspart                                order for DME    Equipment being ordered: Walker Wheels () and Walker () Treatment Diagnosis: impaired gait                                * oxyCODONE-acetaminophen  MG per tablet   Commonly known as:  PERCOCET   Take 1-2 tablets by mouth every 4 hours as needed for pain (moderate to severe)   Last time this was given:  1 tablet on 10/9/2017 12:44 PM                                * oxyCODONE-acetaminophen  MG per tablet   Commonly known as:  PERCOCET   Take 1 tablet by mouth every 6 hours as needed for moderate to severe pain maximum 4 tablet(s) per day   Last time this was given:  1 tablet on 10/9/2017 12:44 PM   Notes to Patient:  One percocet given at 12:44 PM                                senna-docusate 8.6-50 MG per tablet   Commonly known as:  SENOKOT-S;PERICOLACE   Take 1-2 tablets by mouth 2 times daily Take while on oral narcotics to prevent or treat constipation.                                SIMVASTATIN PO   Take 20 mg by mouth At Bedtime                                venlafaxine 75 MG Tb24 24 hr tablet   Commonly known as:  EFFEXOR-ER   Take 150 mg by mouth daily                                VIAGRA PO   Take 50 mg by mouth daily as needed                                WELLBUTRIN XL PO   Take 150 mg by mouth daily                                ZOLPIDEM TARTRATE PO   Take 10 mg by mouth nightly as needed for sleep                                * Notice:  This list has 2 medication(s) that are the same as other medications prescribed for you. Read the directions carefully, and ask your doctor or other care provider to review them with you.

## 2017-10-09 NOTE — ANESTHESIA PREPROCEDURE EVALUATION
Anesthesia Evaluation     . Pt has had prior anesthetic. Type: General and Regional    No history of anesthetic complications          ROS/MED HX    ENT/Pulmonary:     (+)sleep apnea, tobacco use, Past use doesn't use CPAP , . .    Neurologic: Comment: polio      Cardiovascular:     (+) Dyslipidemia, hypertension----. : . . . :. . pulmonary hypertension,       METS/Exercise Tolerance:     Hematologic:     (+) Anemia, -      Musculoskeletal:         GI/Hepatic:     (+) hepatitis type C, liver disease,       Renal/Genitourinary:     (+) Nephrolithiasis , BPH,    (-) renal disease   Endo:     (+) type II DM Using insulin - not using insulin pump .   (-) Type I DM   Psychiatric:         Infectious Disease:         Malignancy:         Other:    (+) H/O Chronic Pain,                   Physical Exam  Normal systems: cardiovascular and dental    Airway   Mallampati: II  TM distance: >3 FB  Neck ROM: full    Dental     Cardiovascular   Rhythm and rate: regular and normal      Pulmonary    breath sounds clear to auscultation                    Anesthesia Plan      History & Physical Review  History and physical reviewed and following examination; no interval change.    ASA Status:  3 .    NPO Status:  > 8 hours    Plan for General, LMA and Periph. Nerve Block for postop pain with Propofol induction.   PONV prophylaxis:  Ondansetron (or other 5HT-3) and Dexamethasone or Solumedrol       Postoperative Care  Postoperative pain management:  IV analgesics, Oral pain medications and Peripheral nerve block (Single Shot).      Consents  Anesthetic plan, risks, benefits and alternatives discussed with:  Patient..                          .

## 2017-10-09 NOTE — ANESTHESIA CARE TRANSFER NOTE
Patient: Huey Lucas    Procedure(s):  OPEN RIGHT ANKLE MEDIAL GUTTER DEBRIDEMENT   - Wound Class: I-Clean    Diagnosis: RIGHT ANKLE PAIN  Diagnosis Additional Information: No value filed.    Anesthesia Type:   General, Peripheral Nerve Block     Note:  Airway :LMA  Patient transferred to:PACU  Comments: Transferred to PACU, spontaneous respirations, 10L oxygen via facemask attached to end of LMA.  All monitors and alarms on and functioning, VSS.  Patient awake, comfortable.  Report to PACU RN.      Vitals: (Last set prior to Anesthesia Care Transfer)    CRNA VITALS  10/9/2017 1048 - 10/9/2017 1124      10/9/2017             SpO2: 95 %    Resp Rate (set): 10                Electronically Signed By: OFELIA Mcdermott CRNA  October 9, 2017  11:24 AM

## 2017-10-09 NOTE — ANESTHESIA PROCEDURE NOTES
Procedures  Rt ankle block for pain relief at surgeon's request; 25GA, 20cc 1/2% Ropivicaine; sural nerve not blocked

## 2017-10-09 NOTE — DISCHARGE INSTRUCTIONS
If your bladder area becomes painful and distended and you are unable to urinate then call Dr Martinez's office and report to hospital Emergency Room      Same Day Surgery Discharge Instructions for  Sedation and General Anesthesia       It's not unusual to feel dizzy, light-headed or faint for up to 24 hours after surgery or while taking pain medication.  If you have these symptoms: sit for a few minutes before standing and have someone assist you when you get up to walk or use the bathroom.      You should rest and relax for the next 24 hours. We recommend you make arrangements to have an adult stay with you for at least 24 hours after your discharge.  Avoid hazardous and strenuous activity.      DO NOT DRIVE any vehicle or operate mechanical equipment for 24 hours following the end of your surgery.  Even though you may feel normal, your reactions may be affected by the medication you have received.      Do not drink alcoholic beverages for 24 hours following surgery.       Slowly progress to your regular diet as you feel able. It's not unusual to feel nauseated and/or vomit after receiving anesthesia.  If you develop these symptoms, drink clear liquids (apple juice, ginger ale, broth, 7-up, etc. ) until you feel better.  If your nausea and vomiting persists for 24 hours, please notify your surgeon.        All narcotic pain medications, along with inactivity and anesthesia, can cause constipation. Drinking plenty of liquids and increasing fiber intake will help.      For any questions of a medical nature, call your surgeon.      Do not make important decisions for 24 hours.      If you had general anesthesia, you may have a sore throat for a couple of days related to the breathing tube used during surgery.  You may use Cepacol lozenges to help with this discomfort.  If it worsens or if you develop a fever, contact your surgeon.       If you feel your pain is not well managed with the pain medications prescribed by  your surgeon, please contact your surgeon's office to let them know so they can address your concerns.         While you were at the hospital today you received Toradol, an antiinflammatory medication similar to Ibuprofen.  You should not take other antiinflammatory medication, such as Toradol, Ibuprofen, Motrin, Advil, Aleve, Naprosyn, etc, until 5:15PM.        POST-OPERATIVE INSTRUCTIONS  FOOT AND ANKLE SURGERY  SIMEON Alfaro P.A.-C    These precautions MUST be followed for the first 24 hours after surgery:    Upon discharge, go directly home.    You must make arrangements to have a responsible adult stay with you.    DO NOT DRINK ALCOHOLIC BEVERAGES    It is not unusual to feel lightheaded up to 24 hours after surgery or while taking pain medication.  If you feel lightheaded, sit for a few minutes before standing and have someone assisted you when you get up to walk or use the restroom.    Do not use any mechanical equipment.    Do not make any important or legal decisions for 24 hours or while on pain medications.    You may experience dry mouth, sore throat, or sleep disturbances from the anesthesia and medications used during surgery.  Generalized muscle aches can sometimes occur.  These usually disappear in 12-24 hours.    POST-OPERATIVE CARE GUIDELINES    The following are general guidelines about what to expect the first days/weeks after surgery.  They are not specific, and your recovery may be slightly different.    Blood clots are not common, but are emergencies.  If you have sudden onset pain in your calf or leg, or have sudden shortness of breath, go to the Emergency Department.      Elevation of your operative foot/ankle is key to reducing the swelling in the immediate post-operative phase (first 3-5 days).  When you are at rest, elevate your foot at or above the level of your heart.  When sitting, your foot should be elevated on a chair or stool; not hanging down.      You  should plan to rest the day after surgery no matter how minor the procedure.  More complicated procedures will require more time to return to normal activity.      Foot and ankle surgery is painful in most cases - use your medication as directed for the first 24 hours after surgery.  It is not unusual for the pain to be worse a day or two after surgery than on the day of.  If your pain is more than 8/10 contact our office.  If you don t have pain, gradually decrease your pain meds by substituting Tylenol.  Please don t use Advil/ibuprofen unless we order it for you.      You will be prescribed Percocet or Vicodin for pain, Vistaril for spasms/pain adjunct, Senokot for constipation.  If you have had trouble taking these meds before or experience nausea or vomiting after surgery from your medication, please advise the recovery room nurses.  If you are already at home, try drinking only clear liquids and/or call our office.      All pain medications, along with inactivity can cause constipation.  Use the Senakot as directed, increase fluid intake to 1 quart per day and increase your dietary fiber.  (The  P  fruits - peaches, plums, pears, and prunes as well as anise/black licorice are recommended.)    It is not unusual to run a low-grade fever after surgery.  If your temperature is elevated above 102 , lasts longer than 24 hours, or is questionable in any way, contact our office.      Drainage from your cast/dressing is normal.  Reinforce your cast/dressing with 4x4 dressings and cover with an Ace wrap.  Wait until 24 hours after surgery to change your dressings; by this time most of your bleeding will have stopped.  If you have drainage through your dressings 2 days after surgery, contact our office.      You cast/dressing will be changed at your 2-week follow up appointment.  They should be kept clean and DRY.  If your cast/dressing is damaged before that, contact our office.      It is normal to experience some  bruising in the toes after surgery and they may appear  dark  when your foot hangs down.  It is important to actively wiggle your toes for at least 5-10 minutes each hour UNLESS you had surgery on those toes.      Use ice on your foot/ankle over the dressing/cast for 20 minutes per hour, 10 or more times per day.  A large bag of frozen peas works well.      Bathing: take a tub or sponge bath instead of a shower if possible during the first two weeks.  If you choose to shower, cover the dressing/cast with a waterproof covering, these may be ordered from www.seal-tight.com or are available at some pharmacies/medical supply stores.  Another option is XeroSox, which is the original vacuum sealed bandage and cast cover.  The cast cover has a vacuum seal and is absolutely waterproof.  6-937-307-6820 or www.xerosox.[employer] for more information.      Driving:  For surgery on the left foot/ankle, you may drive as soon as narcotic medications are stopped.  For surgery on the right foot/ankle, you may drive when you are out of a cast, off pain medications, and you feel secure with braking.      In general, listen to your foot/ankle.  If you have a sudden, dramatic increase in pain that does not resolve after an hour or so, something is wrong - call our office.  If you fall or bump your foot/ankle and have sudden pain that resolves, give it 24 hours before you call.      Many of your questions can be addressed at your 2-week follow-up appointment - please make a list of things to ask us as they come up during your recovery.      If you had a nerve block it is common to have numbness in your leg and foot for up to 30 hours after surgery.  If your leg or foot is still numb more than 30 hours after surgery, please call the office.      FUTURE DENTIST VISITS:  If you have had a total ankle arthroplasty please be advised you must be on antibiotics prior to ANY dental work.  Please call your dentist office ahead of time and make them  aware of this as your dentist will be able to order the prescription.  If you have had any other surgery this is not a concern.    If you have any further questions or concerns, please call our office at [phone]

## 2017-10-09 NOTE — IP AVS SNAPSHOT
[facility] Same Day Surgery    [address]    [place] [address]    Phone:  [phone]    Fax:  [phone]                                       After Visit Summary   10/9/2017    Huey Lucas    MRN: [id number]           After Visit Summary Signature Page     I have received my discharge instructions, and my questions have been answered. I have discussed any challenges I see with this plan with the nurse or doctor.    ..........................................................................................................................................  Patient/Patient Representative Signature      ..........................................................................................................................................  Patient Representative Print Name and Relationship to Patient    ..................................................               ................................................  Date                                            Time    ..........................................................................................................................................  Reviewed by Signature/Title    ...................................................              ..............................................  Date                                                            Time

## 2017-10-09 NOTE — ANESTHESIA POSTPROCEDURE EVALUATION
Patient: Huey Lucas    Procedure(s):  OPEN RIGHT ANKLE MEDIAL GUTTER DEBRIDEMENT   - Wound Class: I-Clean    Diagnosis:RIGHT ANKLE PAIN  Diagnosis Additional Information: No value filed.    Anesthesia Type:  General, Peripheral Nerve Block    Note:  Anesthesia Post Evaluation    Patient location during evaluation: PACU  Patient participation: Able to fully participate in evaluation  Level of consciousness: awake  Pain management: adequate  Airway patency: patent  Cardiovascular status: acceptable  Respiratory status: acceptable  Hydration status: acceptable  PONV: controlled     Anesthetic complications: None          Last vitals:  Vitals:    10/09/17 1130 10/09/17 1135 10/09/17 1145   BP: 111/69 111/69    Pulse:      Resp: 10 13 29   Temp:      SpO2: 98% 98% 97%         Electronically Signed By: Meek Madera MD  October 9, 2017  11:57 AM

## 2019-02-27 RX ORDER — ZOLPIDEM TARTRATE 10 MG/1
10 TABLET ORAL
COMMUNITY

## 2019-02-27 RX ORDER — VENLAFAXINE HYDROCHLORIDE 150 MG/1
150 CAPSULE, EXTENDED RELEASE ORAL EVERY EVENING
Status: ON HOLD | COMMUNITY
End: 2019-12-05

## 2019-02-27 RX ORDER — SILDENAFIL 100 MG/1
100 TABLET, FILM COATED ORAL DAILY PRN
COMMUNITY

## 2019-02-27 RX ORDER — SIMVASTATIN 20 MG
20 TABLET ORAL AT BEDTIME
Status: ON HOLD | COMMUNITY
End: 2019-12-07

## 2019-02-27 RX ORDER — TESTOSTERONE CYPIONATE 1000 MG/10ML
150 INJECTION, SOLUTION INTRAMUSCULAR
COMMUNITY

## 2019-03-04 ENCOUNTER — APPOINTMENT (OUTPATIENT)
Dept: GENERAL RADIOLOGY | Facility: CLINIC | Age: 69
End: 2019-03-04
Attending: ORTHOPAEDIC SURGERY
Payer: MEDICARE

## 2019-03-04 ENCOUNTER — ANESTHESIA EVENT (OUTPATIENT)
Dept: SURGERY | Facility: CLINIC | Age: 69
End: 2019-03-04
Payer: MEDICARE

## 2019-03-04 ENCOUNTER — ANESTHESIA (OUTPATIENT)
Dept: SURGERY | Facility: CLINIC | Age: 69
End: 2019-03-04
Payer: MEDICARE

## 2019-03-04 ENCOUNTER — HOSPITAL ENCOUNTER (OUTPATIENT)
Facility: CLINIC | Age: 69
Discharge: HOME | End: 2019-03-04
Attending: ORTHOPAEDIC SURGERY | Admitting: ORTHOPAEDIC SURGERY
Payer: MEDICARE

## 2019-03-04 VITALS
OXYGEN SATURATION: 96 % | HEART RATE: 82 BPM | HEIGHT: 72 IN | SYSTOLIC BLOOD PRESSURE: 127 MMHG | DIASTOLIC BLOOD PRESSURE: 74 MMHG | BODY MASS INDEX: 34.46 KG/M2 | WEIGHT: 254.4 LBS | RESPIRATION RATE: 16 BRPM | TEMPERATURE: 97.2 F

## 2019-03-04 DIAGNOSIS — Z98.890 S/P FOOT SURGERY, LEFT: Primary | ICD-10-CM

## 2019-03-04 PROCEDURE — 40000170 ZZH STATISTIC PRE-PROCEDURE ASSESSMENT II: Performed by: ORTHOPAEDIC SURGERY

## 2019-03-04 PROCEDURE — L8699 PROSTHETIC IMPLANT NOS: HCPCS | Performed by: ORTHOPAEDIC SURGERY

## 2019-03-04 PROCEDURE — 71000013 ZZH RECOVERY PHASE 1 LEVEL 1 EA ADDTL HR: Performed by: ORTHOPAEDIC SURGERY

## 2019-03-04 PROCEDURE — 25000132 ZZH RX MED GY IP 250 OP 250 PS 637: Mod: GY | Performed by: PHYSICIAN ASSISTANT

## 2019-03-04 PROCEDURE — 25800030 ZZH RX IP 258 OP 636: Performed by: NURSE ANESTHETIST, CERTIFIED REGISTERED

## 2019-03-04 PROCEDURE — 25000132 ZZH RX MED GY IP 250 OP 250 PS 637: Mod: GY | Performed by: ORTHOPAEDIC SURGERY

## 2019-03-04 PROCEDURE — 27210794 ZZH OR GENERAL SUPPLY STERILE: Performed by: ORTHOPAEDIC SURGERY

## 2019-03-04 PROCEDURE — 71000027 ZZH RECOVERY PHASE 2 EACH 15 MINS: Performed by: ORTHOPAEDIC SURGERY

## 2019-03-04 PROCEDURE — C1713 ANCHOR/SCREW BN/BN,TIS/BN: HCPCS | Performed by: ORTHOPAEDIC SURGERY

## 2019-03-04 PROCEDURE — A9270 NON-COVERED ITEM OR SERVICE: HCPCS | Mod: GY | Performed by: ORTHOPAEDIC SURGERY

## 2019-03-04 PROCEDURE — 25000125 ZZHC RX 250: Performed by: ORTHOPAEDIC SURGERY

## 2019-03-04 PROCEDURE — 25000125 ZZHC RX 250: Performed by: NURSE ANESTHETIST, CERTIFIED REGISTERED

## 2019-03-04 PROCEDURE — 25000128 H RX IP 250 OP 636: Performed by: NURSE ANESTHETIST, CERTIFIED REGISTERED

## 2019-03-04 PROCEDURE — 71000012 ZZH RECOVERY PHASE 1 LEVEL 1 FIRST HR: Performed by: ORTHOPAEDIC SURGERY

## 2019-03-04 PROCEDURE — A9270 NON-COVERED ITEM OR SERVICE: HCPCS | Mod: GY | Performed by: PHYSICIAN ASSISTANT

## 2019-03-04 PROCEDURE — 37000008 ZZH ANESTHESIA TECHNICAL FEE, 1ST 30 MIN: Performed by: ORTHOPAEDIC SURGERY

## 2019-03-04 PROCEDURE — 25800030 ZZH RX IP 258 OP 636: Performed by: ANESTHESIOLOGY

## 2019-03-04 PROCEDURE — 36000058 ZZH SURGERY LEVEL 3 EA 15 ADDTL MIN: Performed by: ORTHOPAEDIC SURGERY

## 2019-03-04 PROCEDURE — 27110028 ZZH OR GENERAL SUPPLY NON-STERILE: Performed by: ORTHOPAEDIC SURGERY

## 2019-03-04 PROCEDURE — 25000128 H RX IP 250 OP 636: Performed by: ANESTHESIOLOGY

## 2019-03-04 PROCEDURE — 36000060 ZZH SURGERY LEVEL 3 W FLUORO 1ST 30 MIN: Performed by: ORTHOPAEDIC SURGERY

## 2019-03-04 PROCEDURE — 25000128 H RX IP 250 OP 636: Performed by: PHYSICIAN ASSISTANT

## 2019-03-04 PROCEDURE — 40000277 XR SURGERY CARM FLUORO LESS THAN 5 MIN W STILLS

## 2019-03-04 PROCEDURE — 37000009 ZZH ANESTHESIA TECHNICAL FEE, EACH ADDTL 15 MIN: Performed by: ORTHOPAEDIC SURGERY

## 2019-03-04 PROCEDURE — 25000566 ZZH SEVOFLURANE, EA 15 MIN: Performed by: ORTHOPAEDIC SURGERY

## 2019-03-04 DEVICE — IMP SCR SYN CORTEX 1.5X16MM SELF TAP SS 200.816: Type: IMPLANTABLE DEVICE | Site: FOOT | Status: FUNCTIONAL

## 2019-03-04 DEVICE — IMPLANTABLE DEVICE: Type: IMPLANTABLE DEVICE | Site: FOOT | Status: FUNCTIONAL

## 2019-03-04 DEVICE — GRAFT BONE INFUSE BMP MED 7510400: Type: IMPLANTABLE DEVICE | Site: FOOT | Status: FUNCTIONAL

## 2019-03-04 RX ORDER — AMOXICILLIN 250 MG
1-2 CAPSULE ORAL 2 TIMES DAILY
Qty: 30 TABLET | Refills: 1 | Status: ON HOLD | OUTPATIENT
Start: 2019-03-04 | End: 2019-12-05

## 2019-03-04 RX ORDER — FENTANYL CITRATE 50 UG/ML
25-50 INJECTION, SOLUTION INTRAMUSCULAR; INTRAVENOUS
Status: DISCONTINUED | OUTPATIENT
Start: 2019-03-04 | End: 2019-03-04 | Stop reason: HOSPADM

## 2019-03-04 RX ORDER — FENTANYL CITRATE 50 UG/ML
100 INJECTION, SOLUTION INTRAMUSCULAR; INTRAVENOUS ONCE
Status: COMPLETED | OUTPATIENT
Start: 2019-03-04 | End: 2019-03-04

## 2019-03-04 RX ORDER — ONDANSETRON 4 MG/1
4 TABLET, ORALLY DISINTEGRATING ORAL
Status: DISCONTINUED | OUTPATIENT
Start: 2019-03-04 | End: 2019-03-04 | Stop reason: HOSPADM

## 2019-03-04 RX ORDER — FENTANYL CITRATE 50 UG/ML
50 INJECTION, SOLUTION INTRAMUSCULAR; INTRAVENOUS
Status: DISCONTINUED | OUTPATIENT
Start: 2019-03-04 | End: 2019-03-04 | Stop reason: HOSPADM

## 2019-03-04 RX ORDER — EPHEDRINE SULFATE 50 MG/ML
INJECTION, SOLUTION INTRAMUSCULAR; INTRAVENOUS; SUBCUTANEOUS PRN
Status: DISCONTINUED | OUTPATIENT
Start: 2019-03-04 | End: 2019-03-04

## 2019-03-04 RX ORDER — HYDROMORPHONE HYDROCHLORIDE 1 MG/ML
.3-.5 INJECTION, SOLUTION INTRAMUSCULAR; INTRAVENOUS; SUBCUTANEOUS EVERY 5 MIN PRN
Status: DISCONTINUED | OUTPATIENT
Start: 2019-03-04 | End: 2019-03-04 | Stop reason: HOSPADM

## 2019-03-04 RX ORDER — OXYCODONE HCL 10 MG/1
10 TABLET, FILM COATED, EXTENDED RELEASE ORAL EVERY 12 HOURS
Status: DISCONTINUED | OUTPATIENT
Start: 2019-03-04 | End: 2019-03-04 | Stop reason: HOSPADM

## 2019-03-04 RX ORDER — ONDANSETRON 4 MG/1
4-8 TABLET, ORALLY DISINTEGRATING ORAL EVERY 6 HOURS PRN
Qty: 12 TABLET | Refills: 1 | Status: ON HOLD | OUTPATIENT
Start: 2019-03-04 | End: 2019-12-05

## 2019-03-04 RX ORDER — PROPOFOL 10 MG/ML
INJECTION, EMULSION INTRAVENOUS PRN
Status: DISCONTINUED | OUTPATIENT
Start: 2019-03-04 | End: 2019-03-04

## 2019-03-04 RX ORDER — NALOXONE HYDROCHLORIDE 0.4 MG/ML
.1-.4 INJECTION, SOLUTION INTRAMUSCULAR; INTRAVENOUS; SUBCUTANEOUS
Status: DISCONTINUED | OUTPATIENT
Start: 2019-03-04 | End: 2019-03-04 | Stop reason: HOSPADM

## 2019-03-04 RX ORDER — OXYCODONE AND ACETAMINOPHEN 5; 325 MG/1; MG/1
2 TABLET ORAL
Status: COMPLETED | OUTPATIENT
Start: 2019-03-04 | End: 2019-03-04

## 2019-03-04 RX ORDER — CEFAZOLIN SODIUM 1 G/3ML
1 INJECTION, POWDER, FOR SOLUTION INTRAMUSCULAR; INTRAVENOUS SEE ADMIN INSTRUCTIONS
Status: DISCONTINUED | OUTPATIENT
Start: 2019-03-04 | End: 2019-03-04 | Stop reason: HOSPADM

## 2019-03-04 RX ORDER — OXYCODONE AND ACETAMINOPHEN 5; 325 MG/1; MG/1
1-2 TABLET ORAL EVERY 4 HOURS PRN
Qty: 42 TABLET | Refills: 0 | Status: ON HOLD | OUTPATIENT
Start: 2019-03-04 | End: 2019-12-05

## 2019-03-04 RX ORDER — ONDANSETRON 2 MG/ML
INJECTION INTRAMUSCULAR; INTRAVENOUS PRN
Status: DISCONTINUED | OUTPATIENT
Start: 2019-03-04 | End: 2019-03-04

## 2019-03-04 RX ORDER — CEFAZOLIN SODIUM 2 G/100ML
2 INJECTION, SOLUTION INTRAVENOUS
Status: COMPLETED | OUTPATIENT
Start: 2019-03-04 | End: 2019-03-04

## 2019-03-04 RX ORDER — ONDANSETRON 2 MG/ML
4 INJECTION INTRAMUSCULAR; INTRAVENOUS EVERY 30 MIN PRN
Status: DISCONTINUED | OUTPATIENT
Start: 2019-03-04 | End: 2019-03-04 | Stop reason: HOSPADM

## 2019-03-04 RX ORDER — SODIUM CHLORIDE, SODIUM LACTATE, POTASSIUM CHLORIDE, CALCIUM CHLORIDE 600; 310; 30; 20 MG/100ML; MG/100ML; MG/100ML; MG/100ML
INJECTION, SOLUTION INTRAVENOUS CONTINUOUS
Status: DISCONTINUED | OUTPATIENT
Start: 2019-03-04 | End: 2019-03-04 | Stop reason: HOSPADM

## 2019-03-04 RX ORDER — ONDANSETRON 4 MG/1
4 TABLET, ORALLY DISINTEGRATING ORAL EVERY 30 MIN PRN
Status: DISCONTINUED | OUTPATIENT
Start: 2019-03-04 | End: 2019-03-04 | Stop reason: HOSPADM

## 2019-03-04 RX ORDER — LIDOCAINE HYDROCHLORIDE 20 MG/ML
INJECTION, SOLUTION INFILTRATION; PERINEURAL PRN
Status: DISCONTINUED | OUTPATIENT
Start: 2019-03-04 | End: 2019-03-04

## 2019-03-04 RX ORDER — LIDOCAINE 40 MG/G
CREAM TOPICAL
Status: DISCONTINUED | OUTPATIENT
Start: 2019-03-04 | End: 2019-03-04 | Stop reason: HOSPADM

## 2019-03-04 RX ORDER — OXYCODONE HCL 10 MG/1
10 TABLET, FILM COATED, EXTENDED RELEASE ORAL EVERY 12 HOURS
Qty: 10 TABLET | Refills: 0 | Status: SHIPPED | OUTPATIENT
Start: 2019-03-04 | End: 2019-04-03

## 2019-03-04 RX ORDER — MAGNESIUM HYDROXIDE 1200 MG/15ML
LIQUID ORAL PRN
Status: DISCONTINUED | OUTPATIENT
Start: 2019-03-04 | End: 2019-03-04 | Stop reason: HOSPADM

## 2019-03-04 RX ADMIN — PHENYLEPHRINE HYDROCHLORIDE 50 MCG: 10 INJECTION, SOLUTION INTRAMUSCULAR; INTRAVENOUS; SUBCUTANEOUS at 10:46

## 2019-03-04 RX ADMIN — OXYCODONE HYDROCHLORIDE 10 MG: 10 TABLET, FILM COATED, EXTENDED RELEASE ORAL at 12:18

## 2019-03-04 RX ADMIN — PHENYLEPHRINE HYDROCHLORIDE 100 MCG: 10 INJECTION, SOLUTION INTRAMUSCULAR; INTRAVENOUS; SUBCUTANEOUS at 10:26

## 2019-03-04 RX ADMIN — PHENYLEPHRINE HYDROCHLORIDE 100 MCG: 10 INJECTION, SOLUTION INTRAMUSCULAR; INTRAVENOUS; SUBCUTANEOUS at 10:30

## 2019-03-04 RX ADMIN — Medication 0.5 MG: at 11:41

## 2019-03-04 RX ADMIN — ONDANSETRON 4 MG: 2 INJECTION INTRAMUSCULAR; INTRAVENOUS at 09:52

## 2019-03-04 RX ADMIN — PHENYLEPHRINE HYDROCHLORIDE 150 MCG: 10 INJECTION, SOLUTION INTRAMUSCULAR; INTRAVENOUS; SUBCUTANEOUS at 09:53

## 2019-03-04 RX ADMIN — PHENYLEPHRINE HYDROCHLORIDE 150 MCG: 10 INJECTION, SOLUTION INTRAMUSCULAR; INTRAVENOUS; SUBCUTANEOUS at 10:38

## 2019-03-04 RX ADMIN — Medication 5 MG: at 10:30

## 2019-03-04 RX ADMIN — PHENYLEPHRINE HYDROCHLORIDE 100 MCG: 10 INJECTION, SOLUTION INTRAMUSCULAR; INTRAVENOUS; SUBCUTANEOUS at 10:22

## 2019-03-04 RX ADMIN — PROPOFOL 100 MG: 10 INJECTION, EMULSION INTRAVENOUS at 09:48

## 2019-03-04 RX ADMIN — PROPOFOL 10 MG: 10 INJECTION, EMULSION INTRAVENOUS at 10:35

## 2019-03-04 RX ADMIN — DEXMEDETOMIDINE HYDROCHLORIDE 16 MCG: 100 INJECTION, SOLUTION INTRAVENOUS at 09:47

## 2019-03-04 RX ADMIN — PHENYLEPHRINE HYDROCHLORIDE 100 MCG: 10 INJECTION, SOLUTION INTRAMUSCULAR; INTRAVENOUS; SUBCUTANEOUS at 10:43

## 2019-03-04 RX ADMIN — LIDOCAINE HYDROCHLORIDE 100 MG: 20 INJECTION, SOLUTION INFILTRATION; PERINEURAL at 09:47

## 2019-03-04 RX ADMIN — CEFAZOLIN SODIUM 2 G: 2 INJECTION, SOLUTION INTRAVENOUS at 09:53

## 2019-03-04 RX ADMIN — PROPOFOL 100 MG: 10 INJECTION, EMULSION INTRAVENOUS at 09:47

## 2019-03-04 RX ADMIN — Medication 5 MG: at 10:06

## 2019-03-04 RX ADMIN — Medication 5 MG: at 09:51

## 2019-03-04 RX ADMIN — Medication 5 MG: at 10:25

## 2019-03-04 RX ADMIN — SODIUM CHLORIDE, POTASSIUM CHLORIDE, SODIUM LACTATE AND CALCIUM CHLORIDE: 600; 310; 30; 20 INJECTION, SOLUTION INTRAVENOUS at 09:03

## 2019-03-04 RX ADMIN — PHENYLEPHRINE HYDROCHLORIDE 150 MCG: 10 INJECTION, SOLUTION INTRAMUSCULAR; INTRAVENOUS; SUBCUTANEOUS at 10:05

## 2019-03-04 RX ADMIN — PHENYLEPHRINE HYDROCHLORIDE 100 MCG: 10 INJECTION, SOLUTION INTRAMUSCULAR; INTRAVENOUS; SUBCUTANEOUS at 09:55

## 2019-03-04 RX ADMIN — PHENYLEPHRINE HYDROCHLORIDE 100 MCG: 10 INJECTION, SOLUTION INTRAMUSCULAR; INTRAVENOUS; SUBCUTANEOUS at 09:57

## 2019-03-04 RX ADMIN — DEXMEDETOMIDINE HYDROCHLORIDE 4 MCG: 100 INJECTION, SOLUTION INTRAVENOUS at 10:04

## 2019-03-04 RX ADMIN — SODIUM CHLORIDE, POTASSIUM CHLORIDE, SODIUM LACTATE AND CALCIUM CHLORIDE: 600; 310; 30; 20 INJECTION, SOLUTION INTRAVENOUS at 11:03

## 2019-03-04 RX ADMIN — FENTANYL CITRATE 100 MCG: 50 INJECTION, SOLUTION INTRAMUSCULAR; INTRAVENOUS at 08:51

## 2019-03-04 RX ADMIN — Medication 5 MG: at 09:49

## 2019-03-04 RX ADMIN — PHENYLEPHRINE HYDROCHLORIDE 100 MCG: 10 INJECTION, SOLUTION INTRAMUSCULAR; INTRAVENOUS; SUBCUTANEOUS at 10:53

## 2019-03-04 RX ADMIN — PHENYLEPHRINE HYDROCHLORIDE 100 MCG: 10 INJECTION, SOLUTION INTRAMUSCULAR; INTRAVENOUS; SUBCUTANEOUS at 09:58

## 2019-03-04 RX ADMIN — PHENYLEPHRINE HYDROCHLORIDE 100 MCG: 10 INJECTION, SOLUTION INTRAMUSCULAR; INTRAVENOUS; SUBCUTANEOUS at 10:16

## 2019-03-04 RX ADMIN — OXYCODONE HYDROCHLORIDE AND ACETAMINOPHEN 2 TABLET: 5; 325 TABLET ORAL at 11:57

## 2019-03-04 ASSESSMENT — LIFESTYLE VARIABLES: TOBACCO_USE: 1

## 2019-03-04 ASSESSMENT — MIFFLIN-ST. JEOR: SCORE: 1956.95

## 2019-03-04 NOTE — ANESTHESIA PROCEDURE NOTES
Peripheral nerve/Neuraxial procedure note : saphenous  Pre-Procedure  Performed by Marek Barlow MD  Location: pre-op      Pre-Anesthestic Checklist: patient identified, IV checked, site marked, risks and benefits discussed, informed consent, monitors and equipment checked, at physician/surgeon's request and post-op pain management    Timeout  Correct Patient: Yes   Correct Procedure: Yes   Correct Site: Yes   Correct Laterality: Yes   Correct Position: Yes   Site Marked: Yes   .   Procedure Documentation    .    Procedure:    Saphenous.  Local skin infiltrated with 3 mL of 1% lidocaine.     Ultrasound used to identify targeted nerve, plexus, or vascular marker and placed a needle adjacent to it., Ultrasound was used to visualize the spread of the anesthetic in close proximity to the above stated nerve. A permanent image is entered into the patient's record.  Patient Prep;chlorhexidine gluconate and isopropyl alcohol.  Nerve Stim: Initial Level 1 mA. Lowest motor response mA..  Needle: insulated, short bevel Needle Gauge: 20.    Needle Length (Inches) 2  .       Assessment/Narrative  Paresthesias: No.  .  The placement was negative for: blood aspirated, painful injection and site bleeding.  Bolus given via needle..   Secured via.   Complications: none. Comments:  Saphenous Nerve Block  10 ml 0.5% Ropivacaine with 1:400,000 Epinephrine

## 2019-03-04 NOTE — ANESTHESIA PROCEDURE NOTES
Peripheral nerve/Neuraxial procedure note : sciatic  Pre-Procedure  Performed by Marek Barlow MD  Location: pre-op      Pre-Anesthestic Checklist: patient identified, IV checked, site marked, risks and benefits discussed, informed consent, monitors and equipment checked, at physician/surgeon's request and post-op pain management    Timeout  Correct Patient: Yes   Correct Procedure: Yes   Correct Site: Yes   Correct Laterality: Yes   Correct Position: Yes   Site Marked: Yes   .   Procedure Documentation    .    Procedure:    Sciatic.  Local skin infiltrated with 5 mL of 1% lidocaine.     Ultrasound used to identify targeted nerve, plexus, or vascular marker and placed a needle adjacent to it., Ultrasound was used to visualize the spread of the anesthetic in close proximity to the above stated nerve. A permanent image is entered into the patient's record.  Patient Prep;mask, sterile gloves, chlorhexidine gluconate and isopropyl alcohol, patient draped.  Nerve Stim: Initial Level 1 mA. Lowest motor response mA..  Needle: insulated, short bevel Needle Gauge: 20.    Needle Length (Inches) 4  .       Assessment/Narrative  Paresthesias: No.  .  The placement was negative for: blood aspirated, painful injection and site bleeding.  Bolus given via needle..   Secured via.   Complications: none. Comments:  Sciatic Nerve Block - Popliteal Approach  30 ml 0.5% Ropivacaine with 1:400,000 Epinephrine

## 2019-03-04 NOTE — ANESTHESIA POSTPROCEDURE EVALUATION
Patient: Huey Lucas    Procedure(s):  LEFT REVISION ARTHRODESIS ANKLE  AND BUNIONECTOMY   Bunionectomy    Diagnosis:FAILED TRIPLE ARTHRODESIS AND BUNION  Diagnosis Additional Information: No value filed.    Anesthesia Type:  General, LMA, Periph. Nerve Block for postop pain    Note:  Anesthesia Post Evaluation    Patient location during evaluation: PACU  Patient participation: Able to fully participate in evaluation  Level of consciousness: awake and alert  Pain management: adequate  Airway patency: patent  Cardiovascular status: acceptable  Respiratory status: acceptable  Hydration status: acceptable  PONV: none and controlled     Anesthetic complications: None          Last vitals:  Vitals:    03/04/19 1200 03/04/19 1215 03/04/19 1320   BP: 116/72 101/74 127/74   Pulse: 75 82    Resp: 11 13 16   Temp: 36.3  C (97.3  F) 36.2  C (97.2  F)    SpO2: 96% 95% 96%         Electronically Signed By: Marek Barlow MD  March 4, 2019  2:23 PM

## 2019-03-04 NOTE — ANESTHESIA PREPROCEDURE EVALUATION
Procedure: Procedure(s):  LEFT REVISION ARTHRODESIS ANKLE ( INFUSE ; [facility] )^  LEFT REVISION BUNIONECTOMY  Preop diagnosis: FAILED TRIPLE ARTHRODESIS AND BUNION    Allergies   Allergen Reactions     Levaquin Other (See Comments)     Swelling in knees and pain in chest, muscle aches.  Was hospitalized at [facility] 12/09.     Past Medical History:   Diagnosis Date     AAA (abdominal aortic aneurysm) (H)     surgery 2011, s/p EVAR 12-30-09     Arthritis     osteoarthritis     Baker's cyst of knee      BPH (benign prostatic hyperplasia)      Chronic anemia      Chronic pain syndrome      Chronic renal insufficiency      CKD (chronic kidney disease)      Complex regional pain syndrome      Cyst of pituitary gland (H)     surgery 2011     Depression      Diabetes mellitus (H)     type II, on oral medications     Erectile dysfunction      Hepatitis C, chronic (H)     history of; resolved per pt (12/07/15)     Hypertension      Hypogonadism male      Late effects of acute poliomyelitis      Polio      Polyarthritis     inflammatory     Pulmonary hypertension (H)     H/O     Pulmonary hypertension (H)      Pulmonary hypertension (H)      Pure hypercholesterolemia      Renal stone      Right ankle pain      S/P insertion of spinal cord stimulator      Sleep apnea     C PAP not using since 4/2015     Past Surgical History:   Procedure Laterality Date     ABDOMEN SURGERY      AAA repair 2011     ARTHRODESIS ANKLE Left 4/13/2015    Procedure: ARTHRODESIS ANKLE;  Surgeon: Kim Martinez MD;  Location: [facility]     ARTHRODESIS FOOT  8/18/2014    Procedure: ARTHRODESIS FOOT;  Surgeon: Kim Martinez MD;  Location: [facility]     ARTHROPLASTY KNEE  5/1/2012    Procedure:ARTHROPLASTY KNEE; LEFT TOTAL KNEE ARTHROPLASTY (DEPUY)^; Surgeon:JOSIAS QUINTANA; Location: OR     ARTHROPLASTY KNEE Right 1/30/2017    Procedure: ARTHROPLASTY KNEE;  Surgeon: Josias Quintana MD;  Location:  OR     ARTHROSCOPY SHOULDER  ROTATOR CUFF REPAIR  7/17/2013    Procedure: ARTHROSCOPY SHOULDER ROTATOR CUFF REPAIR;  RIGHT SHOULDER ARTHROSCOPIC ROTATOR CUFF REPAIR, SUBACROMIAL DECOMPRESSION WITH ACROMIOPLASTY, BICEP TENODESIS. ;  Surgeon: Josias Quintana MD;  Location: [facility]     BACK SURGERY  6/2011    spinal cord stimulator removed     BUNIONECTOMY Right 12/7/2015    Procedure: BUNIONECTOMY;  Surgeon: Kim Martinez MD;  Location: [facility]     COLONOSCOPY       ENT SURGERY  7 years old    tonsillectomy     ESOPHAGOGASTRODUODENOSCOPY       EXCISE CYST GENERIC (LOCATION) Right 3/7/2016    Procedure: EXCISE CYST GENERIC (LOCATION);  Surgeon: Kim Martinez MD;  Location: [facility]     GI SURGERY  in high school    drain pilonidal cyst     GRAFT BONE FROM ILIAC CREST Right 3/7/2016    Procedure: GRAFT BONE FROM ILIAC CREST;  Surgeon: Kim Mratinez MD;  Location: [facility]     HEAD & NECK SURGERY  2/6/15    excision of cyst back of neck     IRRIGATION AND DEBRIDEMENT LOWER EXTREMITY, COMBINED Right 10/9/2017    Procedure: COMBINED IRRIGATION AND DEBRIDEMENT LOWER EXTREMITY;  OPEN RIGHT ANKLE MEDIAL GUTTER DEBRIDEMENT  ;  Surgeon: Kim Martinez MD;  Location: [facility]     ORTHOPEDIC SURGERY      right ankle replacement     ORTHOPEDIC SURGERY  5/01    arthroscopy right knee     ORTHOPEDIC SURGERY  7/01    right total ankle with subtalar fusion     ORTHOPEDIC SURGERY  3/05, 9/10    right ankle revision x 2     ORTHOPEDIC SURGERY  8/2014    L naviculocuneiform fusion, gastrocnemius lengthening and 2nd hammer toe repair     PITUITARY SURGERY  7/2011    transsphenoidal fenestration of pituitary cyst     REMOVE HARDWARE FOOT Left 4/13/2015    Procedure: REMOVE HARDWARE FOOT;  Surgeon: Kim Martinez MD;  Location: [facility]     Social History     Tobacco Use     Smoking status: Former Smoker     Packs/day: 1.50     Years: 20.00     Pack years: 30.00     Types: Cigarettes, Cigars     Last attempt to quit: 1/1/2000     Years since  quittin.1     Smokeless tobacco: Never Used     Tobacco comment: quit    Substance Use Topics     Alcohol use: No     Comment: 1 drink every 3-4 weeks     Prior to Admission medications    Medication Sig Start Date End Date Taking? Authorizing Provider   acetaminophen (TYLENOL) 325 MG tablet Take 3 tablets (975 mg) by mouth every 8 hours 17  Yes Hafsa Mcfadden PA-C   BuPROPion HCl (WELLBUTRIN XL PO) Take 150 mg by mouth daily   Yes Reported, Patient   insulin detemir (LEVEMIR VIAL) 100 UNIT/ML vial Inject 40 Units Subcutaneous every morning (before breakfast)   Yes Reported, Patient   LISINOPRIL PO Take 20 mg by mouth daily    Yes Reported, Patient   sildenafil (VIAGRA) 100 MG tablet Take 100 mg by mouth daily as needed   Yes Reported, Patient   simvastatin (ZOCOR) 20 MG tablet Take 20 mg by mouth At Bedtime   Yes Reported, Patient   testosterone cypionate (DEPOTESTOSTERONE) 100 MG/ML injection Inject 150 mg into the muscle every 14 days   Yes Reported, Patient   venlafaxine (EFFEXOR-XR) 150 MG 24 hr capsule Take 150 mg by mouth every evening   Yes Reported, Patient   zolpidem (AMBIEN) 10 MG tablet Take 10 mg by mouth nightly as needed for sleep   Yes Reported, Patient   ASPIRIN PO Take 81 mg by mouth daily    Reported, Patient   ibuprofen (ADVIL/MOTRIN) 600 MG tablet Take 1 tablet (600 mg) by mouth every 6 hours as needed for pain (mild) 10/9/17   Amrit Castaneda PA-C   insulin aspart (NOVOLOG FLEXPEN) 100 UNIT/ML injection Inject 20 Units Subcutaneous 3 times daily (with meals) Base- 20 Units per meal  Add 3 units for every 50 over 150 Blood Glucose reading  If Blood Glucose is :  151-199 = 20 units + 3 = 23 Units  200-249 = 20 Units + 6 = 26 Units  250-299 = 20 units + 9 = 29 Units    Reported, Patient   order for DME Equipment being ordered: Walker Wheels () and Walker ()  Treatment Diagnosis: impaired gait 17   Josias Quintana MD   oxyCODONE-acetaminophen (PERCOCET)   MG per tablet Take 1-2 tablets by mouth every 4 hours as needed for pain (moderate to severe) 10/9/17   Amrit Castaneda PA-C   oxyCODONE-acetaminophen (PERCOCET)  MG per tablet Take 1 tablet by mouth every 6 hours as needed for moderate to severe pain maximum 4 tablet(s) per day 10/9/17   Amrit Castaneda PA-C   senna-docusate (SENOKOT-S;PERICOLACE) 8.6-50 MG per tablet Take 1-2 tablets by mouth 2 times daily Take while on oral narcotics to prevent or treat constipation. 10/9/17   Amrit Castaneda PA-C     Current Facility-Administered Medications Ordered in Epic   Medication Dose Route Frequency Last Rate Last Dose     ceFAZolin (ANCEF) 1 g vial to attach to  ml bag for ADULT or 50 ml bag for PEDS  1 g Intravenous See Admin Instructions         ceFAZolin (ANCEF) intermittent infusion 2 g in 100 mL dextrose PRE-MIX  2 g Intravenous Pre-Op/Pre-procedure x 1 dose         fentaNYL (PF) (SUBLIMAZE) injection 25-50 mcg  25-50 mcg Intravenous Q2 Min PRN         fentaNYL (PF) (SUBLIMAZE) injection 50 mcg  50 mcg Intravenous Pre-Op/Pre-procedure x 1 dose         HYDROmorphone (PF) (DILAUDID) injection 0.3-0.5 mg  0.3-0.5 mg Intravenous Q5 Min PRN         lactated ringers infusion   Intravenous Continuous         midazolam (VERSED) injection 1.5 mg  1.5 mg Intravenous Pre-Op/Pre-procedure x 1 dose         ondansetron (ZOFRAN-ODT) ODT tab 4 mg  4 mg Oral Q30 Min PRN        Or     ondansetron (ZOFRAN) injection 4 mg  4 mg Intravenous Q30 Min PRN         prochlorperazine (COMPAZINE) injection 5 mg  5 mg Intravenous Q6H PRN         No current [facility]-ordered outpatient medications on file.       lactated ringers       Wt Readings from Last 1 Encounters:   03/04/19 115.4 kg (254 lb 6.4 oz)     Temp Readings from Last 1 Encounters:   03/04/19 36.4  C (97.5  F) (Temporal)     BP Readings from Last 6 Encounters:   03/04/19 130/82   10/09/17 116/59   02/02/17 110/62   03/07/16 122/78   12/07/15 92/49    04/13/15 99/57     Pulse Readings from Last 4 Encounters:   19 90   10/09/17 75   17 106   04/13/15 98     Resp Readings from Last 1 Encounters:   19 20     SpO2 Readings from Last 1 Encounters:   19 97%     Recent Labs   Lab Test 17  0620 17  0648 17  0543 12  0604   NA  --   --   --   --  137 138   POTASSIUM  --   --   --   --  4.2 4.8   CHLORIDE  --   --   --   --  103 107   CO2  --   --   --   --  26 26   ANIONGAP  --   --   --   --  7 6   GLC  --  158*  --   --  180* 120*   BUN  --   --   --   --  22 24   CR 1.76*  --  1.45*   < > 1.06 1.10   REZA  --   --   --   --  8.7 8.5    < > = values in this interval not displayed.     No results for input(s): AST, ALT, ALKPHOS, BILITOTAL, LIPASE in the last 97643 hours.  Recent Labs   Lab Test 17  0620 17  0623 17  0648 17  0604  11  0802   WBC  --   --   --   --   --  10.9  --  6.0   HGB  --  10.2* 10.9*  --    < > 10.1*  --  10.5*   *  --   --  172  --  170   < > 215    < > = values in this interval not displayed.     No results for input(s): ABO, RH in the last 92796 hours.  No results for input(s): INR, PTT in the last 16219 hours.   No results for input(s): TROPI in the last 71318 hours.  No results for input(s): PH, PCO2, PO2, HCO3 in the last 80463 hours.  No results for input(s): HCG in the last 63350 hours.  No results found for this or any previous visit (from the past 744 hour(s)).    RECENT LABS:   ECG:   ECHO:   Anesthesia Pre-Procedure Evaluation    Patient: Huey Lucas   MRN: [id number] : 1950          Preoperative Diagnosis: FAILED TRIPLE ARTHRODESIS AND BUNION    Procedure(s):  LEFT REVISION ARTHRODESIS ANKLE ( INFUSE ; [facility] )^  LEFT REVISION BUNIONECTOMY    Past Medical History:   Diagnosis Date     AAA (abdominal aortic aneurysm) (H)     surgery , s/p EVAR 09     Arthritis     osteoarthritis      Baker's cyst of knee      BPH (benign prostatic hyperplasia)      Chronic anemia      Chronic pain syndrome      Chronic renal insufficiency      CKD (chronic kidney disease)      Complex regional pain syndrome      Cyst of pituitary gland (H)     surgery 2011     Depression      Diabetes mellitus (H)     type II, on oral medications     Erectile dysfunction      Hepatitis C, chronic (H)     history of; resolved per pt (12/07/15)     Hypertension      Hypogonadism male      Late effects of acute poliomyelitis      Polio      Polyarthritis     inflammatory     Pulmonary hypertension (H)     H/O     Pulmonary hypertension (H)      Pulmonary hypertension (H)      Pure hypercholesterolemia      Renal stone      Right ankle pain      S/P insertion of spinal cord stimulator      Sleep apnea     C PAP not using since 4/2015     Past Surgical History:   Procedure Laterality Date     ABDOMEN SURGERY      AAA repair 2011     ARTHRODESIS ANKLE Left 4/13/2015    Procedure: ARTHRODESIS ANKLE;  Surgeon: Kim Martinez MD;  Location: [facility]     ARTHRODESIS FOOT  8/18/2014    Procedure: ARTHRODESIS FOOT;  Surgeon: Kim Martinez MD;  Location: [facility]     ARTHROPLASTY KNEE  5/1/2012    Procedure:ARTHROPLASTY KNEE; LEFT TOTAL KNEE ARTHROPLASTY (DEPUY)^; Surgeon:JOSIAS QUINTANA; Location: OR     ARTHROPLASTY KNEE Right 1/30/2017    Procedure: ARTHROPLASTY KNEE;  Surgeon: Josias Quintana MD;  Location:  OR     ARTHROSCOPY SHOULDER ROTATOR CUFF REPAIR  7/17/2013    Procedure: ARTHROSCOPY SHOULDER ROTATOR CUFF REPAIR;  RIGHT SHOULDER ARTHROSCOPIC ROTATOR CUFF REPAIR, SUBACROMIAL DECOMPRESSION WITH ACROMIOPLASTY, BICEP TENODESIS. ;  Surgeon: Josias Quintana MD;  Location: [facility]     BACK SURGERY  6/2011    spinal cord stimulator removed     BUNIONECTOMY Right 12/7/2015    Procedure: BUNIONECTOMY;  Surgeon: Kim Martinez MD;  Location: [facility]     COLONOSCOPY       ENT SURGERY  7 years old    tonsillectomy      ESOPHAGOGASTRODUODENOSCOPY       EXCISE CYST GENERIC (LOCATION) Right 3/7/2016    Procedure: EXCISE CYST GENERIC (LOCATION);  Surgeon: Kim Martinez MD;  Location: [facility]     GI SURGERY  in high school    drain pilonidal cyst     GRAFT BONE FROM ILIAC CREST Right 3/7/2016    Procedure: GRAFT BONE FROM ILIAC CREST;  Surgeon: Kim Martinez MD;  Location: [facility]     HEAD & NECK SURGERY  2/6/15    excision of cyst back of neck     IRRIGATION AND DEBRIDEMENT LOWER EXTREMITY, COMBINED Right 10/9/2017    Procedure: COMBINED IRRIGATION AND DEBRIDEMENT LOWER EXTREMITY;  OPEN RIGHT ANKLE MEDIAL GUTTER DEBRIDEMENT  ;  Surgeon: Kim Martinez MD;  Location: [facility]     ORTHOPEDIC SURGERY      right ankle replacement     ORTHOPEDIC SURGERY  5/01    arthroscopy right knee     ORTHOPEDIC SURGERY  7/01    right total ankle with subtalar fusion     ORTHOPEDIC SURGERY  3/05, 9/10    right ankle revision x 2     ORTHOPEDIC SURGERY  8/2014    L naviculocuneiform fusion, gastrocnemius lengthening and 2nd hammer toe repair     PITUITARY SURGERY  7/2011    transsphenoidal fenestration of pituitary cyst     REMOVE HARDWARE FOOT Left 4/13/2015    Procedure: REMOVE HARDWARE FOOT;  Surgeon: Kim Martinez MD;  Location: [facility]       Anesthesia Evaluation     . Pt has had prior anesthetic.     No history of anesthetic complications          ROS/MED HX    ENT/Pulmonary:     (+)sleep apnea, tobacco use, Past use doesn't use CPAP , . .    Neurologic:       Cardiovascular:     (+) hypertension----. : . . . :. . pulmonary hypertension,       METS/Exercise Tolerance:     Hematologic:         Musculoskeletal:   (+) , , other musculoskeletal- DJD ankle      GI/Hepatic:     (+) hepatitis type C, liver disease,       Renal/Genitourinary:     (+) chronic renal disease,       Endo:     (+) type II DM .      Psychiatric:         Infectious Disease:         Malignancy:         Other: Comment: H/o spinal cord stimulator   (+) H/O Chronic  Pain,                        Physical Exam  Normal systems: cardiovascular and pulmonary    Airway   Mallampati: II  Neck ROM: full    Dental   (+) caps    Cardiovascular       Pulmonary             Lab Results   Component Value Date    WBC 10.9 05/02/2012    HGB 10.2 (L) 02/01/2017    HCT 30.0 (L) 05/02/2012     (L) 02/02/2017     05/03/2012    POTASSIUM 4.2 05/03/2012    CHLORIDE 103 05/03/2012    CO2 26 05/03/2012    BUN 22 05/03/2012    CR 1.76 (H) 02/02/2017     (H) 01/31/2017    REZA 8.7 05/03/2012    ALBUMIN 3.8 12/16/2009    PROTTOTAL 7.9 12/16/2009    ALT 30 12/16/2009    AST 42 12/16/2009    ALKPHOS 61 12/16/2009    BILITOTAL 0.4 12/16/2009    LIPASE 24 12/13/2009    AMYLASE 39 12/13/2009    INR 0.86 11/24/2006       Preop Vitals  BP Readings from Last 3 Encounters:   03/04/19 130/82   10/09/17 116/59   02/02/17 110/62    Pulse Readings from Last 3 Encounters:   03/04/19 90   10/09/17 75   02/01/17 106      Resp Readings from Last 3 Encounters:   03/04/19 20   10/09/17 16   02/02/17 16    SpO2 Readings from Last 3 Encounters:   03/04/19 97%   10/09/17 96%   02/02/17 96%      Temp Readings from Last 1 Encounters:   03/04/19 36.4  C (97.5  F) (Temporal)    Ht Readings from Last 1 Encounters:   03/04/19 1.829 m (6')      Wt Readings from Last 1 Encounters:   03/04/19 115.4 kg (254 lb 6.4 oz)    Estimated body mass index is 34.5 kg/m  as calculated from the following:    Height as of this encounter: 1.829 m (6').    Weight as of this encounter: 115.4 kg (254 lb 6.4 oz).       Anesthesia Plan      History & Physical Review  History and physical reviewed and following examination; no interval change.    ASA Status:  3 .    NPO Status:  > 8 hours    Plan for General, LMA and Periph. Nerve Block for postop pain with Intravenous induction. Maintenance will be Balanced.    PONV prophylaxis:  Ondansetron (or other 5HT-3)       Postoperative Care  Postoperative pain management:  IV analgesics and  Peripheral nerve block (Single Shot).      Consents  Anesthetic plan, risks, benefits and alternatives discussed with:  Patient..                 Marek Barlow MD

## 2019-03-04 NOTE — DISCHARGE INSTRUCTIONS
Same Day Surgery Discharge Instructions for  Sedation and General Anesthesia       It's not unusual to feel dizzy, light-headed or faint for up to 24 hours after surgery or while taking pain medication.  If you have these symptoms: sit for a few minutes before standing and have someone assist you when you get up to walk or use the bathroom.      You should rest and relax for the next 24 hours. We recommend you make arrangements to have an adult stay with you for at least 24 hours after your discharge.  Avoid hazardous and strenuous activity.      DO NOT DRIVE any vehicle or operate mechanical equipment for 24 hours following the end of your surgery.  Even though you may feel normal, your reactions may be affected by the medication you have received.      Do not drink alcoholic beverages for 24 hours following surgery.       Slowly progress to your regular diet as you feel able. It's not unusual to feel nauseated and/or vomit after receiving anesthesia.  If you develop these symptoms, drink clear liquids (apple juice, ginger ale, broth, 7-up, etc. ) until you feel better.  If your nausea and vomiting persists for 24 hours, please notify your surgeon.        All narcotic pain medications, along with inactivity and anesthesia, can cause constipation. Drinking plenty of liquids and increasing fiber intake will help.      For any questions of a medical nature, call your surgeon.      Do not make important decisions for 24 hours.      If you had general anesthesia, you may have a sore throat for a couple of days related to the breathing tube used during surgery.  You may use Cepacol lozenges to help with this discomfort.  If it worsens or if you develop a fever, contact your surgeon.       If you feel your pain is not well managed with the pain medications prescribed by your surgeon, please contact your surgeon's office to let them know so they can address your concerns.           POST-OPERATIVE INSTRUCTIONS  FOOT AND  "ANKLE SURGERY  RAYRAY Martinez M.D.  Bennett Castaneda P.A.-C    These precautions MUST be followed for the first 24 hours after surgery:    Upon discharge, go directly home.    You must make arrangements to have a responsible adult stay with you.    DO NOT DRINK ALCOHOLIC BEVERAGES    It is not unusual to feel lightheaded up to 24 hours after surgery or while taking pain medication.  If you feel lightheaded, sit for a few minutes before standing and have someone assisted you when you get up to walk or use the restroom.    Do not use any mechanical equipment.    Do not make any important or legal decisions for 24 hours or while on pain medications.    You may experience dry mouth, sore throat, or sleep disturbances from the anesthesia and medications used during surgery.  Generalized muscle aches can   sometimes occur.  These usually disappear in 12-24 hours.        [facility]      DISCHARGE INSTRUCTIONS FOLLOWING   REGIONAL BLOCK ANESTHESIA      Numbness or lack of feeling in the arm/leg that was operated may last up to 24 hours.  The average time is usually 10-15 hours.  You may not be able to lift or move the arm or leg where the operation was by itself during that time.  Long-acting local anesthetic medicines were used to give you long-lasting pain relief.    Wear a sling until your arm is completely \"awake\"    Avoid bumping your arm, leg or foot while it is numb    Avoid extremes of hot or cold while it is numb    Remain quiet and restful the day of surgery.  Resume normal activities gradually over the next day or so as advise by your surgeon.    Do not drive or operate  Any machinery until your extremity is full  \"awake\"        You will have a tingling and prickly sensation in your arm/leg as the feeling begins to return; you can also expect some discomfort. The amount of discomfort is unpredictable, but if you have more pain that can be controlled with the pain medication you received, you should " contact your surgeon.  Start to take your pain pills as soon as you start to feel any discomfort or pain.  We strongly recommend starting your pain medication at bedtime if you haven't already done so.  This is in the event that the block wears off while you are asleep.                      POST-OPERATIVE CARE GUIDELINES    The following are general guidelines about what to expect the first days/weeks after surgery.  They are not specific, and your recovery may be slightly different.    Blood clots are not common, but are emergencies.  If you have sudden onset pain in your calf or leg, or have sudden shortness of breath, go to the Emergency Department.      Elevation of your operative foot/ankle is key to reducing the swelling in the immediate post-operative phase (first 3-5 days).  When you are at rest, elevate your foot at or above the level of your heart.  When sitting, your foot should be elevated on a chair or stool; not hanging down.      You should plan to rest the day after surgery no matter how minor the procedure.  More complicated procedures will require more time to return to normal activity.      Foot and ankle surgery is painful in most cases.   It is not unusual for the pain to be worse a day or two after surgery than on the day of.  If your pain is more than 8/10 contact our office.  If you don t have pain, gradually decrease your pain meds by substituting Tylenol.  Please don t use Advil/ibuprofen unless we order it for you.      You will be prescribed Percocet or Vicodin for pain, Vistaril for spasms/pain adjunct, Senokot for constipation.  If you have had trouble taking these meds before or experience nausea or vomiting after surgery from your medication, please advise the recovery room nurses.  If you are already at home, try drinking only clear liquids and/or call our office.      All pain medications, along with inactivity can cause constipation.  Use the Senakot as directed, increase fluid  intake to 1 quart per day and increase your dietary fiber.  (The  P  fruits - peaches, plums, pears, and prunes as well as anise/black licorice are recommended.)    It is not unusual to run a low-grade fever after surgery.  If your temperature is elevated above 102 , lasts longer than 24 hours, or is questionable in any way, contact our office.      Drainage from your cast/dressing is normal.  Reinforce your cast/dressing with 4x4 dressings and cover with an Ace wrap.  Wait until 24 hours after surgery to change your dressings; by this time most of your bleeding will have stopped.  If you have drainage through your dressings 2 days after surgery, contact our office.      You cast/dressing will be changed at your 2-week follow up appointment.  They should be kept clean and DRY.  If your cast/dressing is damaged before that, contact our office.      It is normal to experience some bruising in the toes after surgery and they may appear  dark  when your foot hangs down.  It is important to actively wiggle your toes for at least 5-10 minutes each hour UNLESS you had surgery on those toes.      Use ice on your foot/ankle over the dressing/cast for 20 minutes per hour, 10 or more times per day.  A large bag of frozen peas works well.      Bathing: take a tub or sponge bath instead of a shower if possible during the first two weeks.  If you choose to shower, cover the dressing/cast with a waterproof covering, these may be ordered from www.seal-tight.com or are available at some pharmacies/medical supply stores.  Another option is XeroSox, which is the original vacuum sealed bandage and cast cover.  The cast cover has a vacuum seal and is absolutely waterproof.  3-690-742-7414 or www.xerosox.com for more information.      Driving:  For surgery on the left foot/ankle, you may drive as soon as narcotic medications are stopped.  For surgery on the right foot/ankle, you may drive when you are out of a cast, off pain medications,  and you feel secure with braking.      In general, listen to your foot/ankle.  If you have a sudden, dramatic increase in pain that does not resolve after an hour or so, something is wrong - call our office.  If you fall or bump your foot/ankle and have sudden pain that resolves, give it 24 hours before you call.      Many of your questions can be addressed at your 2-week follow-up appointment - please make a list of things to ask us as they come up during your recovery.      If you had a nerve block it is common to have numbness in your leg and foot for up to 30 hours after surgery.  If your leg or foot is still numb more than 30 hours after surgery, please call the office.      FUTURE DENTIST VISITS:  If you have had a total ankle arthroplasty please be advised you must be on antibiotics prior to ANY dental work.  Please call your dentist office ahead of time and make them aware of this as your dentist will be able to order the prescription.  If you have had any other surgery this is not a concern.    If you have any further questions or concerns, please call our office at [phone]        Wheeled Knee Walker Rental Facilities    Depending on your post-operative needs, a wheeled knee walker may be a good option for you instead of crutches or a traditional walker.  Some considerations are: the length of time your weight bearing is limited, upper body strength, stairs and/or your need for mobility. You might need a prescription from your surgeon. Insurance may not cover the cost.  The supplier should alert you to the available coverage or check with your insurance carrier.        Location: Store Name: Brands  They Carry: Phone Number:    [employer] [phone]   [facility] [employer] [phone]   [employer] [place]  [phone]   [facility] [place]  [phone]   [facility]  [place], [facility] [phone]   [facility] [place] [phone]   [facility] [employer] [place], Drive [phone]   [facility] [place], [place]  [phone]   [facility] [place] [phone]   [facility] [place], [place]  [phone]   [facility]  [place] [phone]   [facility] Medical [place]  [phone]   [facility] [place] [phone]

## 2019-03-04 NOTE — ANESTHESIA CARE TRANSFER NOTE
Patient: Huey Lucas    Procedure(s):  LEFT REVISION ARTHRODESIS ANKLE  AND BUNIONECTOMY   Bunionectomy    Diagnosis: FAILED TRIPLE ARTHRODESIS AND BUNION  Diagnosis Additional Information: No value filed.    Anesthesia Type:   General, LMA, Periph. Nerve Block for postop pain     Note:  Airway :Face Mask  Patient transferred to:PACU  Comments: At end of procedure, spontaneous respirations, adequate tidal volumes, followed commands to voice, LMA removed atraumatically, oropharynx suctioned, airway patent after LMA removal. Oxygen via facemask at 8 liters per minute to PACU. Oxygen tubing connected to wall O2 in PACU, SpO2, NiBP, and EKG monitors and alarms on and functioning, Marek Hugger warmer connected to patient gown, report on patient's clinical status given to PACU RN, RN questions answered.Handoff Report: Identifed the Patient, Identified the Reponsible Provider, Reviewed the pertinent medical history, Discussed the surgical course, Reviewed Intra-OP anesthesia mangement and issues during anesthesia, Set expectations for post-procedure period and Allowed opportunity for questions and acknowledgement of understanding      Vitals: (Last set prior to Anesthesia Care Transfer)    CRNA VITALS  3/4/2019 1050 - 3/4/2019 1129      3/4/2019             Resp Rate (set):  10                Electronically Signed By: OFELIA Chopra CRNA  March 4, 2019  11:29 AM

## 2019-03-05 NOTE — OP NOTE
Procedure Date: 03/04/2019      PREOPERATIVE DIAGNOSES:   1.  Malunion of a previous left triple arthrodesis.     2.  Large left-sided bunion deformity.      POSTOPERATIVE DIAGNOSES:   1.  Malunion of a previous left triple arthrodesis.     2.  Large left-sided bunion deformity.      PROCEDURES PERFORMED:      1.  Removal of hardware from the medial column left foot followed by a corrective osteotomy through the talonavicular joint with revision fixation.   2.  Medializing osteotomy of the left calcaneus.      3.  BOAT metatarsal osteotomy and bunion repair as well as an Akin osteotomy of the proximal phalanx.      ANESTHESIA:  General.        SURGEON:  Kim Martinez MD       ASSISTANT:  DAVY Long      PREAMBLE:  Mr. Lucas previously had a talonavicular as well as subtalar fusion.  He went on to a malunion with collapse through the hindfoot as well as the mid foot.  He is increasingly painful and because of the deformity is developing a valgus deformity through his ankle.  He also has a large bunion deformity.  Informed consent was obtained for the above-mentioned procedures.      ANTIBIOTICS: Two grams of Ancef was given prior to surgery.  There is no need for postoperative antibiotic prophylaxis.      DESCRIPTION OF PROCEDURE:  After adequate induction of a general anesthetic, the patient was positioned supine on the operating table.  The left leg was sterilely prepped and free draped in the usual fashion.  Tourniquet around the thigh was inflated to 300 mmHg.  The previous posterior incision is used over the calcaneus to remove 2 screws from the calcaneus.      A lateral incision was then laid over the sinus tarsi area.  A saw was used to do a medializing calcaneal osteotomy, medializing the calcaneus by about 10 mm.  The sural nerve as well as the peroneal tendons were protected.  This was followed by a 10 cm longitudinal incision medial over the ankle.  The medial plate was exposed and removed.   This was followed by 30-degree medially based and 20-degree plantarly based corrective osteotomy through the previous talonavicular joint.  The bone was removed and the osteotomy closed.  This gave excellent alignment and correction of the foot deformity.  The osteotomy was reduced and immobilized with a plate and screws.  Due to the previous malunion, Infuse was packed around the fusion area to help increase bone healing.      This was followed by 6 cm incision medial over the great toe MTP joint.  The capsule was incised longitudinally.  The medial eminence was removed with the saw.  This was followed by a 90-degree metatarsal osteotomy with a long plantar limb.  The capital fragment was translated laterally by 6 mm and immobilized with 2 small screws.      The sesamoids were reduced and the medial capsule repaired.      This was followed by a 20-degree medially based closing wedge osteotomy of the proximal phalanx.  The osteotomy was secured with a suture.  This gave excellent alignment of his foot.  The tourniquet was deflated.  Hemostasis obtained.  The wounds closed in layers.  Sterile dressing and a light compressive bandage were applied, followed by a short leg cast.  He tolerated the procedure well and was taken to the recovery room in satisfactory condition.  He can ambulate with crutches.  Sutures will be removed in 2 weeks.         ANTWAN VICTOR MD             D: 2019   T: 2019   MT: KERVIN      Name:     MARIE ARREDONDO   MRN:      [id number]        Account:        [id number]   :      1950           Procedure Date: 2019      Document: [id number]

## 2019-12-05 ENCOUNTER — SURGERY (OUTPATIENT)
Age: 69
End: 2019-12-05
Payer: MEDICARE

## 2019-12-05 ENCOUNTER — HOSPITAL ENCOUNTER (INPATIENT)
Facility: CLINIC | Age: 69
LOS: 2 days | Discharge: HOME | DRG: 247 | End: 2019-12-07
Attending: EMERGENCY MEDICINE | Admitting: HOSPITALIST
Payer: MEDICARE

## 2019-12-05 ENCOUNTER — APPOINTMENT (OUTPATIENT)
Dept: GENERAL RADIOLOGY | Facility: CLINIC | Age: 69
DRG: 247 | End: 2019-12-05
Attending: EMERGENCY MEDICINE
Payer: MEDICARE

## 2019-12-05 DIAGNOSIS — I21.3 ST ELEVATION MYOCARDIAL INFARCTION (STEMI), UNSPECIFIED ARTERY (H): ICD-10-CM

## 2019-12-05 DIAGNOSIS — Z96.651 S/P TOTAL KNEE REPLACEMENT USING CEMENT, RIGHT: Primary | ICD-10-CM

## 2019-12-05 DIAGNOSIS — I25.10 CORONARY ARTERY DISEASE INVOLVING NATIVE CORONARY ARTERY OF NATIVE HEART WITHOUT ANGINA PECTORIS: ICD-10-CM

## 2019-12-05 DIAGNOSIS — I21.11 ST ELEVATION MYOCARDIAL INFARCTION INVOLVING RIGHT CORONARY ARTERY (H): ICD-10-CM

## 2019-12-05 LAB
ALBUMIN SERPL-MCNC: 4.1 G/DL (ref 3.4–5)
ALP SERPL-CCNC: 117 U/L (ref 40–150)
ALT SERPL W P-5'-P-CCNC: 45 U/L (ref 0–70)
ANION GAP SERPL CALCULATED.3IONS-SCNC: 6 MMOL/L (ref 3–14)
APTT PPP: 31 SEC (ref 22–37)
AST SERPL W P-5'-P-CCNC: 21 U/L (ref 0–45)
BASOPHILS # BLD AUTO: 0 10E9/L (ref 0–0.2)
BASOPHILS NFR BLD AUTO: 0.4 %
BILIRUB SERPL-MCNC: 0.4 MG/DL (ref 0.2–1.3)
BUN SERPL-MCNC: 32 MG/DL (ref 7–30)
CALCIUM SERPL-MCNC: 9.3 MG/DL (ref 8.5–10.1)
CHLORIDE SERPL-SCNC: 102 MMOL/L (ref 94–109)
CHOLEST SERPL-MCNC: 146 MG/DL
CO2 SERPL-SCNC: 24 MMOL/L (ref 20–32)
CREAT SERPL-MCNC: 1.61 MG/DL (ref 0.66–1.25)
DIFFERENTIAL METHOD BLD: NORMAL
EOSINOPHIL # BLD AUTO: 0.3 10E9/L (ref 0–0.7)
EOSINOPHIL NFR BLD AUTO: 2.3 %
ERYTHROCYTE [DISTWIDTH] IN BLOOD BY AUTOMATED COUNT: 13.3 % (ref 10–15)
GFR SERPL CREATININE-BSD FRML MDRD: 43 ML/MIN/{1.73_M2}
GLUCOSE BLDC GLUCOMTR-MCNC: 215 MG/DL (ref 70–99)
GLUCOSE BLDC GLUCOMTR-MCNC: 251 MG/DL (ref 70–99)
GLUCOSE BLDC GLUCOMTR-MCNC: 363 MG/DL (ref 70–99)
GLUCOSE SERPL-MCNC: 396 MG/DL (ref 70–99)
HBA1C MFR BLD: 11.3 % (ref 0–5.6)
HCT VFR BLD AUTO: 48.8 % (ref 40–53)
HDLC SERPL-MCNC: 34 MG/DL
HGB BLD-MCNC: 16.3 G/DL (ref 13.3–17.7)
IMM GRANULOCYTES # BLD: 0.1 10E9/L (ref 0–0.4)
IMM GRANULOCYTES NFR BLD: 0.6 %
INR PPP: 1.03 (ref 0.86–1.14)
INTERPRETATION ECG - MUSE: NORMAL
INTERPRETATION ECG - MUSE: NORMAL
KCT BLD-ACNC: 159 SEC (ref 75–150)
KCT BLD-ACNC: 199 SEC (ref 75–150)
KCT BLD-ACNC: 231 SEC (ref 75–150)
KCT BLD-ACNC: 284 SEC (ref 75–150)
LDLC SERPL CALC-MCNC: 54 MG/DL
LYMPHOCYTES # BLD AUTO: 2.4 10E9/L (ref 0.8–5.3)
LYMPHOCYTES NFR BLD AUTO: 22.3 %
MCH RBC QN AUTO: 32.5 PG (ref 26.5–33)
MCHC RBC AUTO-ENTMCNC: 33.4 G/DL (ref 31.5–36.5)
MCV RBC AUTO: 97 FL (ref 78–100)
MONOCYTES # BLD AUTO: 0.9 10E9/L (ref 0–1.3)
MONOCYTES NFR BLD AUTO: 8.5 %
NEUTROPHILS # BLD AUTO: 7.2 10E9/L (ref 1.6–8.3)
NEUTROPHILS NFR BLD AUTO: 65.9 %
NONHDLC SERPL-MCNC: 112 MG/DL
NRBC # BLD AUTO: 0 10*3/UL
NRBC BLD AUTO-RTO: 0 /100
PLATELET # BLD AUTO: 181 10E9/L (ref 150–450)
POTASSIUM SERPL-SCNC: 4.4 MMOL/L (ref 3.4–5.3)
PROT SERPL-MCNC: 7.9 G/DL (ref 6.8–8.8)
RBC # BLD AUTO: 5.01 10E12/L (ref 4.4–5.9)
SODIUM SERPL-SCNC: 132 MMOL/L (ref 133–144)
TRIGL SERPL-MCNC: 289 MG/DL
TROPONIN I SERPL-MCNC: 0.03 UG/L (ref 0–0.04)
TROPONIN I SERPL-MCNC: 30.72 UG/L (ref 0–0.04)
TROPONIN I SERPL-MCNC: 46.33 UG/L (ref 0–0.04)
TROPONIN I SERPL-MCNC: 9.74 UG/L (ref 0–0.04)
WBC # BLD AUTO: 10.9 10E9/L (ref 4–11)

## 2019-12-05 PROCEDURE — 92978 ENDOLUMINL IVUS OCT C 1ST: CPT | Performed by: INTERNAL MEDICINE

## 2019-12-05 PROCEDURE — 92941 PRQ TRLML REVSC TOT OCCL AMI: CPT | Mod: RC | Performed by: INTERNAL MEDICINE

## 2019-12-05 PROCEDURE — 85347 COAGULATION TIME ACTIVATED: CPT

## 2019-12-05 PROCEDURE — 27210794 ZZH OR GENERAL SUPPLY STERILE: Performed by: INTERNAL MEDICINE

## 2019-12-05 PROCEDURE — 00000146 ZZHCL STATISTIC GLUCOSE BY METER IP

## 2019-12-05 PROCEDURE — C1887 CATHETER, GUIDING: HCPCS | Performed by: INTERNAL MEDICINE

## 2019-12-05 PROCEDURE — 92978 ENDOLUMINL IVUS OCT C 1ST: CPT | Mod: 26 | Performed by: INTERNAL MEDICINE

## 2019-12-05 PROCEDURE — 93005 ELECTROCARDIOGRAM TRACING: CPT

## 2019-12-05 PROCEDURE — 84484 ASSAY OF TROPONIN QUANT: CPT | Performed by: EMERGENCY MEDICINE

## 2019-12-05 PROCEDURE — 96374 THER/PROPH/DIAG INJ IV PUSH: CPT

## 2019-12-05 PROCEDURE — B2111ZZ FLUOROSCOPY OF MULTIPLE CORONARY ARTERIES USING LOW OSMOLAR CONTRAST: ICD-10-PCS | Performed by: INTERNAL MEDICINE

## 2019-12-05 PROCEDURE — 71045 X-RAY EXAM CHEST 1 VIEW: CPT

## 2019-12-05 PROCEDURE — 21000001 ZZH R&B HEART CARE

## 2019-12-05 PROCEDURE — 84484 ASSAY OF TROPONIN QUANT: CPT | Performed by: INTERNAL MEDICINE

## 2019-12-05 PROCEDURE — 80061 LIPID PANEL: CPT | Performed by: EMERGENCY MEDICINE

## 2019-12-05 PROCEDURE — 25000125 ZZHC RX 250: Performed by: INTERNAL MEDICINE

## 2019-12-05 PROCEDURE — 25000128 H RX IP 250 OP 636: Performed by: INTERNAL MEDICINE

## 2019-12-05 PROCEDURE — 99152 MOD SED SAME PHYS/QHP 5/>YRS: CPT | Performed by: INTERNAL MEDICINE

## 2019-12-05 PROCEDURE — 93454 CORONARY ARTERY ANGIO S&I: CPT | Mod: 26 | Performed by: INTERNAL MEDICINE

## 2019-12-05 PROCEDURE — 99285 EMERGENCY DEPT VISIT HI MDM: CPT | Mod: 25

## 2019-12-05 PROCEDURE — B240ZZ3 ULTRASONOGRAPHY OF SINGLE CORONARY ARTERY, INTRAVASCULAR: ICD-10-PCS | Performed by: INTERNAL MEDICINE

## 2019-12-05 PROCEDURE — 93005 ELECTROCARDIOGRAM TRACING: CPT | Mod: 76

## 2019-12-05 PROCEDURE — 25000132 ZZH RX MED GY IP 250 OP 250 PS 637: Mod: GY | Performed by: EMERGENCY MEDICINE

## 2019-12-05 PROCEDURE — 83036 HEMOGLOBIN GLYCOSYLATED A1C: CPT | Performed by: PHYSICIAN ASSISTANT

## 2019-12-05 PROCEDURE — 83036 HEMOGLOBIN GLYCOSYLATED A1C: CPT | Performed by: EMERGENCY MEDICINE

## 2019-12-05 PROCEDURE — 80053 COMPREHEN METABOLIC PANEL: CPT | Performed by: EMERGENCY MEDICINE

## 2019-12-05 PROCEDURE — 25000132 ZZH RX MED GY IP 250 OP 250 PS 637: Mod: GY | Performed by: INTERNAL MEDICINE

## 2019-12-05 PROCEDURE — 93010 ELECTROCARDIOGRAM REPORT: CPT | Performed by: INTERNAL MEDICINE

## 2019-12-05 PROCEDURE — 93454 CORONARY ARTERY ANGIO S&I: CPT | Performed by: INTERNAL MEDICINE

## 2019-12-05 PROCEDURE — 85730 THROMBOPLASTIN TIME PARTIAL: CPT | Performed by: EMERGENCY MEDICINE

## 2019-12-05 PROCEDURE — C1725 CATH, TRANSLUMIN NON-LASER: HCPCS | Performed by: INTERNAL MEDICINE

## 2019-12-05 PROCEDURE — 99223 1ST HOSP IP/OBS HIGH 75: CPT | Mod: 25 | Performed by: INTERNAL MEDICINE

## 2019-12-05 PROCEDURE — 25000128 H RX IP 250 OP 636: Performed by: EMERGENCY MEDICINE

## 2019-12-05 PROCEDURE — C9600 PERC DRUG-EL COR STENT SING: HCPCS | Mod: RC | Performed by: INTERNAL MEDICINE

## 2019-12-05 PROCEDURE — 85610 PROTHROMBIN TIME: CPT | Performed by: EMERGENCY MEDICINE

## 2019-12-05 PROCEDURE — 25000132 ZZH RX MED GY IP 250 OP 250 PS 637: Mod: GY | Performed by: PHYSICIAN ASSISTANT

## 2019-12-05 PROCEDURE — 36415 COLL VENOUS BLD VENIPUNCTURE: CPT | Performed by: INTERNAL MEDICINE

## 2019-12-05 PROCEDURE — 25000131 ZZH RX MED GY IP 250 OP 636 PS 637: Mod: GY | Performed by: PHYSICIAN ASSISTANT

## 2019-12-05 PROCEDURE — 36415 COLL VENOUS BLD VENIPUNCTURE: CPT | Performed by: EMERGENCY MEDICINE

## 2019-12-05 PROCEDURE — 027034Z DILATION OF CORONARY ARTERY, ONE ARTERY WITH DRUG-ELUTING INTRALUMINAL DEVICE, PERCUTANEOUS APPROACH: ICD-10-PCS | Performed by: INTERNAL MEDICINE

## 2019-12-05 PROCEDURE — 99153 MOD SED SAME PHYS/QHP EA: CPT | Performed by: INTERNAL MEDICINE

## 2019-12-05 PROCEDURE — C1769 GUIDE WIRE: HCPCS | Performed by: INTERNAL MEDICINE

## 2019-12-05 PROCEDURE — 85025 COMPLETE CBC W/AUTO DIFF WBC: CPT | Performed by: EMERGENCY MEDICINE

## 2019-12-05 PROCEDURE — C1874 STENT, COATED/COV W/DEL SYS: HCPCS | Performed by: INTERNAL MEDICINE

## 2019-12-05 PROCEDURE — 99223 1ST HOSP IP/OBS HIGH 75: CPT | Mod: AI | Performed by: PHYSICIAN ASSISTANT

## 2019-12-05 PROCEDURE — C1753 CATH, INTRAVAS ULTRASOUND: HCPCS | Performed by: INTERNAL MEDICINE

## 2019-12-05 DEVICE — STENT RESOLUTE ONYX DE 2.7FR 3.50X30MM RONYX DE35030UX: Type: IMPLANTABLE DEVICE | Status: FUNCTIONAL

## 2019-12-05 RX ORDER — ASPIRIN 81 MG/1
324 TABLET, CHEWABLE ORAL ONCE
Status: COMPLETED | OUTPATIENT
Start: 2019-12-05 | End: 2019-12-05

## 2019-12-05 RX ORDER — NITROGLYCERIN 0.4 MG/1
0.4 TABLET SUBLINGUAL EVERY 5 MIN PRN
Status: DISCONTINUED | OUTPATIENT
Start: 2019-12-05 | End: 2019-12-07 | Stop reason: HOSPADM

## 2019-12-05 RX ORDER — BUPROPION HYDROCHLORIDE 150 MG/1
150 TABLET ORAL DAILY
Status: DISCONTINUED | OUTPATIENT
Start: 2019-12-05 | End: 2019-12-07 | Stop reason: HOSPADM

## 2019-12-05 RX ORDER — NALOXONE HYDROCHLORIDE 0.4 MG/ML
.2-.4 INJECTION, SOLUTION INTRAMUSCULAR; INTRAVENOUS; SUBCUTANEOUS
Status: DISCONTINUED | OUTPATIENT
Start: 2019-12-05 | End: 2019-12-05

## 2019-12-05 RX ORDER — NALOXONE HYDROCHLORIDE 0.4 MG/ML
.1-.4 INJECTION, SOLUTION INTRAMUSCULAR; INTRAVENOUS; SUBCUTANEOUS
Status: DISCONTINUED | OUTPATIENT
Start: 2019-12-05 | End: 2019-12-07 | Stop reason: HOSPADM

## 2019-12-05 RX ORDER — NITROGLYCERIN 5 MG/ML
VIAL (ML) INTRAVENOUS CONTINUOUS PRN
Status: DISCONTINUED | OUTPATIENT
Start: 2019-12-05 | End: 2019-12-05 | Stop reason: HOSPADM

## 2019-12-05 RX ORDER — OXYCODONE AND ACETAMINOPHEN 5; 325 MG/1; MG/1
1 TABLET ORAL EVERY 4 HOURS PRN
Status: DISCONTINUED | OUTPATIENT
Start: 2019-12-05 | End: 2019-12-07 | Stop reason: HOSPADM

## 2019-12-05 RX ORDER — ATORVASTATIN CALCIUM 40 MG/1
40 TABLET, FILM COATED ORAL DAILY
Status: DISCONTINUED | OUTPATIENT
Start: 2019-12-05 | End: 2019-12-05

## 2019-12-05 RX ORDER — DEXTROSE MONOHYDRATE 25 G/50ML
25-50 INJECTION, SOLUTION INTRAVENOUS
Status: DISCONTINUED | OUTPATIENT
Start: 2019-12-05 | End: 2019-12-07 | Stop reason: HOSPADM

## 2019-12-05 RX ORDER — FLUMAZENIL 0.1 MG/ML
0.2 INJECTION, SOLUTION INTRAVENOUS
Status: ACTIVE | OUTPATIENT
Start: 2019-12-05 | End: 2019-12-05

## 2019-12-05 RX ORDER — SODIUM CHLORIDE 9 MG/ML
INJECTION, SOLUTION INTRAVENOUS CONTINUOUS
Status: DISCONTINUED | OUTPATIENT
Start: 2019-12-05 | End: 2019-12-05

## 2019-12-05 RX ORDER — ATORVASTATIN CALCIUM 80 MG/1
80 TABLET, FILM COATED ORAL DAILY
Status: DISCONTINUED | OUTPATIENT
Start: 2019-12-06 | End: 2019-12-07 | Stop reason: HOSPADM

## 2019-12-05 RX ORDER — FENTANYL CITRATE 50 UG/ML
25-50 INJECTION, SOLUTION INTRAMUSCULAR; INTRAVENOUS
Status: DISPENSED | OUTPATIENT
Start: 2019-12-05 | End: 2019-12-05

## 2019-12-05 RX ORDER — ATROPINE SULFATE 0.1 MG/ML
0.5 INJECTION INTRAVENOUS EVERY 5 MIN PRN
Status: ACTIVE | OUTPATIENT
Start: 2019-12-05 | End: 2019-12-05

## 2019-12-05 RX ORDER — FENTANYL CITRATE 50 UG/ML
INJECTION, SOLUTION INTRAMUSCULAR; INTRAVENOUS
Status: DISCONTINUED | OUTPATIENT
Start: 2019-12-05 | End: 2019-12-05 | Stop reason: HOSPADM

## 2019-12-05 RX ORDER — ACETAMINOPHEN 325 MG/1
650 TABLET ORAL EVERY 4 HOURS PRN
Status: DISCONTINUED | OUTPATIENT
Start: 2019-12-05 | End: 2019-12-07 | Stop reason: HOSPADM

## 2019-12-05 RX ORDER — NICOTINE POLACRILEX 4 MG
15-30 LOZENGE BUCCAL
Status: DISCONTINUED | OUTPATIENT
Start: 2019-12-05 | End: 2019-12-07 | Stop reason: HOSPADM

## 2019-12-05 RX ORDER — ASPIRIN 81 MG/1
81 TABLET ORAL DAILY
Status: DISCONTINUED | OUTPATIENT
Start: 2019-12-05 | End: 2019-12-06

## 2019-12-05 RX ORDER — METOPROLOL TARTRATE 25 MG/1
25 TABLET, FILM COATED ORAL 2 TIMES DAILY
Status: DISCONTINUED | OUTPATIENT
Start: 2019-12-05 | End: 2019-12-07 | Stop reason: HOSPADM

## 2019-12-05 RX ORDER — LISINOPRIL 20 MG/1
20 TABLET ORAL DAILY
Status: DISCONTINUED | OUTPATIENT
Start: 2019-12-06 | End: 2019-12-06

## 2019-12-05 RX ORDER — HEPARIN SODIUM 1000 [USP'U]/ML
INJECTION, SOLUTION INTRAVENOUS; SUBCUTANEOUS
Status: DISCONTINUED | OUTPATIENT
Start: 2019-12-05 | End: 2019-12-05 | Stop reason: HOSPADM

## 2019-12-05 RX ORDER — ONDANSETRON 2 MG/ML
4 INJECTION INTRAMUSCULAR; INTRAVENOUS EVERY 6 HOURS PRN
Status: DISCONTINUED | OUTPATIENT
Start: 2019-12-05 | End: 2019-12-07 | Stop reason: HOSPADM

## 2019-12-05 RX ORDER — ONDANSETRON 4 MG/1
4 TABLET, ORALLY DISINTEGRATING ORAL EVERY 6 HOURS PRN
Status: DISCONTINUED | OUTPATIENT
Start: 2019-12-05 | End: 2019-12-07 | Stop reason: HOSPADM

## 2019-12-05 RX ADMIN — HEPARIN SODIUM 5000 UNITS: 1000 INJECTION, SOLUTION INTRAVENOUS; SUBCUTANEOUS at 09:06

## 2019-12-05 RX ADMIN — ASPIRIN 81 MG 162 MG: 81 TABLET ORAL at 08:40

## 2019-12-05 RX ADMIN — BUPROPION HYDROCHLORIDE 150 MG: 150 TABLET, FILM COATED, EXTENDED RELEASE ORAL at 12:56

## 2019-12-05 RX ADMIN — TICAGRELOR 90 MG: 90 TABLET ORAL at 20:14

## 2019-12-05 RX ADMIN — MIDAZOLAM 1 MG: 1 INJECTION INTRAMUSCULAR; INTRAVENOUS at 09:21

## 2019-12-05 RX ADMIN — LIDOCAINE HYDROCHLORIDE 10 ML: 10 INJECTION, SOLUTION EPIDURAL; INFILTRATION; INTRACAUDAL; PERINEURAL at 09:00

## 2019-12-05 RX ADMIN — NITROGLYCERIN 0.37 MCG/KG/MIN: 5 INJECTION, SOLUTION INTRAVENOUS at 09:05

## 2019-12-05 RX ADMIN — TICAGRELOR 180 MG: 90 TABLET ORAL at 08:41

## 2019-12-05 RX ADMIN — MIDAZOLAM 0.5 MG: 1 INJECTION INTRAMUSCULAR; INTRAVENOUS at 09:07

## 2019-12-05 RX ADMIN — LIDOCAINE HYDROCHLORIDE 10 ML: 10 INJECTION, SOLUTION EPIDURAL; INFILTRATION; INTRACAUDAL; PERINEURAL at 09:18

## 2019-12-05 RX ADMIN — FENTANYL CITRATE 50 MCG: 50 INJECTION, SOLUTION INTRAMUSCULAR; INTRAVENOUS at 08:58

## 2019-12-05 RX ADMIN — INSULIN ASPART 2 UNITS: 100 INJECTION, SOLUTION INTRAVENOUS; SUBCUTANEOUS at 17:13

## 2019-12-05 RX ADMIN — FENTANYL CITRATE 50 MCG: 50 INJECTION, SOLUTION INTRAMUSCULAR; INTRAVENOUS at 14:47

## 2019-12-05 RX ADMIN — METOPROLOL TARTRATE 25 MG: 25 TABLET ORAL at 11:25

## 2019-12-05 RX ADMIN — METOPROLOL TARTRATE 25 MG: 25 TABLET ORAL at 20:14

## 2019-12-05 RX ADMIN — FENTANYL CITRATE 50 MCG: 50 INJECTION, SOLUTION INTRAMUSCULAR; INTRAVENOUS at 09:21

## 2019-12-05 RX ADMIN — MIDAZOLAM 1 MG: 1 INJECTION INTRAMUSCULAR; INTRAVENOUS at 08:58

## 2019-12-05 RX ADMIN — INSULIN ASPART 5 UNITS: 100 INJECTION, SOLUTION INTRAVENOUS; SUBCUTANEOUS at 12:01

## 2019-12-05 RX ADMIN — FENTANYL CITRATE 25 MCG: 50 INJECTION, SOLUTION INTRAMUSCULAR; INTRAVENOUS at 09:07

## 2019-12-05 RX ADMIN — HEPARIN SODIUM 8000 UNITS: 1000 INJECTION INTRAVENOUS; SUBCUTANEOUS at 08:42

## 2019-12-05 ASSESSMENT — ENCOUNTER SYMPTOMS
NAUSEA: 1
SHORTNESS OF BREATH: 1
ABDOMINAL PAIN: 1
DIAPHORESIS: 1

## 2019-12-05 ASSESSMENT — MIFFLIN-ST. JEOR: SCORE: 1931.99

## 2019-12-05 NOTE — PROGRESS NOTES
H&P [facility] #[id number]    ST elevation myocardial infarction s/p SHIVA x1 to mid RCA  Plan for staged LCx in future    Kasie Pastrana PA-C  Hospitalist [facility]  Pager: [phone]

## 2019-12-05 NOTE — ED PROVIDER NOTES
History     Chief Complaint:  Chest Pain      HPI   Huey Lucas is a 69 year old male diabetic with hypertension, diabetes, and high cholesterol and abdominal aortic aneurysm who presents with chest pain. The patient says that he woke up this morning at 0700 with chest pain.  He describes this pain is an extreme heaviness, like something is sitting on his chest.  He endorses diaphoresis, shortness of breath, abdominal pain, and nausea. He says that he took 2 aspirin before coming to the ED. He denies any personal or family history of cardiac issues.     Allergies:  Levaquin      Medications:    Wellbutrin  Novolog  Levemir  Lisinopril   Zofran ODT  Percocet  Senokot  Viagra  Zocor  Depotestosterone  Effexor  Ambien      Past Medical History:    Sleep apnea  Renal stone  Hypercholesterolemia  Pulmonary hypertension  Polyarthritis  Polio  Acute poliomyelitis  Hypogonadism  hypertension  Hepatitis C  Erectile dysfunctions  Diabetes  Depression  Cyst of pituitary gland  CKD  Chronic renal insufficiency  Chronic anemia BPH  Baker's cyst of knee  Arthritis  AAA    Past Surgical History:    Abdomen surgery  Arthrodesis  ankle x2  Arthrodesis foot  Arthoplasty knee x2  Arthroscopy shoulder  Back surgery  Bunionectomy x2  Colonoscopy   ENT surgery  EGD  Excise cyst   GI surgery  Graft bone from iliac crest  Head and neck surgery  Irrigation and debridement   Orthopedic surgery x5  Pituitary surgery   Remove hardware foot     Family History:    Family history reviewed. No pertinent family history.     Social History:  The patient was accompanied to the ED by wife.  Smoking Status: Former Smoker  Smokeless Tobacco: Never Used  Alcohol Use: Negative   Drug Use: Negative  PCP: Higinio Green   Marital Status:       Review of Systems   Constitutional: Positive for diaphoresis.   Respiratory: Positive for shortness of breath.    Cardiovascular: Positive for chest pain.   Gastrointestinal: Positive for abdominal pain  "and nausea.   10 point review of systems performed and is negative except as above and in HPI.       Physical Exam     Patient Vitals for the past 24 hrs:   BP Temp Temp src Pulse Heart Rate Resp SpO2 Height Weight   12/05/19 1400 123/84 -- -- 57 59 (!) 6 98 % -- --   12/05/19 1330 (!) 129/103 -- -- 57 59 18 96 % -- --   12/05/19 1300 121/84 -- -- 64 63 9 97 % -- --   12/05/19 1230 130/78 -- -- 70 68 14 96 % -- --   12/05/19 1200 (!) 128/103 -- -- 70 68 12 96 % -- --   12/05/19 1145 (!) 122/94 -- -- 63 62 16 97 % -- --   12/05/19 1130 135/80 -- -- 66 94 (!) 39 98 % -- --   12/05/19 1115 126/84 -- -- 59 75 8 99 % -- --   12/05/19 1100 119/74 97.4  F (36.3  C) Oral 61 58 19 99 % 1.803 m (5' 11\") --   12/05/19 1045 104/86 -- -- 85 87 25 90 % -- --   12/05/19 1030 113/69 -- -- 85 67 8 97 % -- --   12/05/19 1015 126/69 -- -- 60 71 14 100 % -- --   12/05/19 1000 110/57 -- -- -- 68 12 -- -- --   12/05/19 0807 (!) 161/73 97.4  F (36.3  C) -- 57 -- 20 98 % 1.829 m (6') 113.4 kg (250 lb)        Physical Exam  General: Resting on the gurney, appears uncomfortable. Slightly diaphoretic.  Head:  The scalp, face, and head appear normal  Mouth/Throat: Mucus membranes are moist  CV:  Regular rate    Normal S1 and S2  No pathological murmur   Resp:  Breath sounds clear and equal bilaterally    Non-labored, no retractions or accessory muscle use    No coarseness    No wheezing   GI:  Abdomen is soft, no rigidity. No guarding or rebound.    No tenderness to palpation  MS:  Normal motor assessment of all extremities.    Good capillary refill noted.    No lower extremity edema  Skin:  No rash or lesions noted.  Neuro:   Speech is normal and fluent. No apparent deficit.  Psych: Awake. Alert.  Normal affect.      Appropriate interactions.     Emergency Department Course     ECG:  ECG taken at 0807  Sinus bradycardia  Slight ST elevation inferiorly  Abnormal ECG  Rate 57 bpm. WV interval 132 ms. QRS duration 114 ms. QT/QTc 424/412 ms. " P-R-T axes 44 4 85.    ECG:  ECG taken at 0829  Sinus bradycardia  Left ventricular hypertrophy with repolarization abnormality  ST elevation consider inferior injury or acute infarct  ACUTE MI / STEMI  Consider right ventricular involvement in acute inferior infarct  Abnormal ECG   Progression of ST elevation    Rate 59 bpm. PA interval 148 ms. QRS duration 112 ms. QT/QTc 410/405 ms. P-R-T axes 33 -21 78.     Imaging:  Radiology findings were communicated with the patient who voiced understanding of the findings.    XR Chest Port 1 View  Cardiac silhouette appears to be within normal limits. No  focal airspace opacities identified although the lung bases are not  visualized. No pneumothorax identified on this portable view.   Reading per radiology     Laboratory:  Laboratory findings were communicated with the patient who voiced understanding of the findings.    CBC: WBC 10.9, HGB 16.3,   CMP:  (L),  (H), BUN 32 (H), creatinine 1.61 (H), GFR 43 (L) o/w WNL   Troponin (Collected 0820): 0.032  INR: 1.03  PTT: 31    Procedures    Interventions:  0840 Aspirin 324 mg Oral   Brilinta 180 mg Oral    Heparin 8000 units IV     Emergency Department Course:    0813 Nursing notes and vitals reviewed.    0820 IV was inserted and blood was drawn for laboratory testing, results above.     0831 I performed an exam of the patient as documented above.     0837 XR while in the emergency department, results above.     0848 I spoke with Dr. Farmer of the cardiology service regarding patient's presentation, findings, and plan of care.      0850 I spoke with Dr. Santana of the Cath Lab service regarding patient's presentation, findings, and plan of care.      The patient is admitted into the care of Dr. Santana.     Impression & Plan      Medical Decision Making:  Huey Lucas is a 69 year old male who presents to the emergency department today for evaluation of chest pain. The chest pain was severe and an EKG was  obtained. The EKG showed ST elevation in inferior leads. The patient's history and examination are consistent with ST elevation MI. A portable chest Xray was obtained which showed no evidence of mediastinal widening. The patient was given aspirin in the emergency department as well as a heparin bolus. I activated the cath lab immediately after reviewing the EKG and seeing the patient. The cath lab requested cardiology consult prior to transfer to cath lab. The patient remained hemodynamically stable while in the emergency department. The plan of care was discussed with the patient who understands the importance of prompt disposition to cath lab. All questions were answered and the patient was transferred to cath lab under the care of the cardiologist.     Critical Care time was 25 minutes for this patient excluding procedures.     Diagnosis:  STEMI    Disposition:   Findings and plan explained to the Patient who consents to admission. Discussed the patient with Dr. Santana, who will admit the patient to a CCU bed for further monitoring, evaluation, and treatment.       Scribe Disclosure:  Koby RIVAS, am serving as a scribe at 8:17 AM on 12/5/2019 to document services personally performed by Elisa Khan MD based on my observations and the provider's statements to me.       EMERGENCY DEPARTMENT       Elisa Khan MD  12/05/19 1448

## 2019-12-05 NOTE — Clinical Note
The first balloon was inserted into the right coronary artery.Max pressure = 8 nithya. Total duration = 10 seconds.     Max pressure = 10 nithya. Total duration = 27 seconds.    Balloon reinflated a second time: Max pressure = 10 nithya. Total duration = 27 seconds.

## 2019-12-05 NOTE — CONSULTS
Cardiology Consultation      Huye Lucas MRN# [id number]   YOB: 1950 Age: 69 year old   Date of Admission: 2019     Reason for consult:            Assessment and Plan:   Active Problems:    1. Inferior STEMI    Heparin, ASA, DAPT,     Emergent angiogram    STENTING of RCA    Recommend staged intervention LCX    Results relayed to Dr. Farmer      2. DM              3. HTN              4. Hyperlipidemia              5. PAD    Aortoiliac stenting 10 yrs ago ANW                 Chief Complaint:   Chest Pain           History of Present Illness:   This patient  is a 69-year-old male who presents with chest discomfort.  This woke him up at approximately 7 AM.  He had chest heaviness which also radiated to the low back and was associated with diaphoresis and shortness of breath.  [facility] showed minimal ST segment elevation inferiorly.  They repeated an EKG showing more significant ST elevation but still only probably 1mm.  Cath Lab was activated for a ST elevation myocardial infarction.  Upon arrival to the Cath Lab patient was having 6-7 out of 10 chest discomfort.  He has had no previous myocardial infarction.  Has had no recent trauma or surgery.    Patient has a history of diabetes hypertension hyperlipidemia and peripheral vascular disease with previous aortoiliac stenting at [facility] approximately 10 years ago.             Physical Exam:   Vitals were reviewed  Blood pressure (!) 161/73, pulse 57, temperature 97.4  F (36.3  C), resp. rate 20, height 1.829 m (6'), weight 113.4 kg (250 lb), SpO2 98 %.  Temperatures:  Current - Temp: 97.4  F (36.3  C); Max - Temp  Av.4  F (36.3  C)  Min: 97.4  F (36.3  C)  Max: 97.4  F (36.3  C)  Respiration range: Resp  Av  Min: 20  Max: 20  Pulse range: Pulse  Av  Min: 57  Max: 57  Blood pressure range: Systolic (24hrs), Av , Min:161 , Max:161   ; Diastolic  (24hrs), Av, Min:73, Max:73    Pulse oximetry range: SpO2  Av %  Min: 98 %  Max: 98 %  No intake or output data in the 24 hours ending 19 1001  Constitutional:   awake, alert, cooperative, no apparent distress, and appears stated age     Eyes:   Lids and lashes normal, pupils equal, round and reactive to light, extra ocular muscles intact, sclera clear, conjunctiva normal     Neck:   supple, symmetrical, trachea midline, no JVD     Back:   symmetric     Lungs:   No increased work of breathing, good air exchange, clear to auscultation bilaterally, no crackles or wheezing  clear to auscultation     Cardiovascular:   Normal apical impulse, regular rate and rhythm, normal S1 and S2, no S3 or S4, and no murmur noted.   , , , pulses 2 plus all extermities bilaterally  carotids without bruits bilaterally     Abdomen:   non-tender     Musculoskeletal:   motor strength is 5 out of 5 all extremities bilaterally     Neurologic:   Grossly nonfocal     Skin:   no bruising or bleeding     Additional findings:   Edema   No edema               Past Medical History:   I have reviewed this patient's past medical history  Past Medical History:   Diagnosis Date     AAA (abdominal aortic aneurysm) (H)     surgery , s/p EVAR 09     Arthritis     osteoarthritis     Baker's cyst of knee      BPH (benign prostatic hyperplasia)      Chronic anemia      Chronic pain syndrome      Chronic renal insufficiency      CKD (chronic kidney disease)      Complex regional pain syndrome      Cyst of pituitary gland (H)     surgery      Depression      Diabetes mellitus (H)     type II, on oral medications     Erectile dysfunction      Hepatitis C, chronic (H)     history of; resolved per pt (12/07/15)     Hypertension      Hypogonadism male      Late effects of acute poliomyelitis      Polio      Polyarthritis     inflammatory     Pulmonary hypertension (H)     H/O     Pulmonary hypertension (H)      Pulmonary hypertension  (H)      Pure hypercholesterolemia      Renal stone      Right ankle pain      S/P insertion of spinal cord stimulator      Sleep apnea     C PAP not using since 4/2015             Past Surgical History:   I have reviewed this patient's past surgical history  Past Surgical History:   Procedure Laterality Date     ABDOMEN SURGERY      AAA repair 2011     ARTHRODESIS ANKLE Left 4/13/2015    Procedure: ARTHRODESIS ANKLE;  Surgeon: Kim Martinez MD;  Location: [facility]     ARTHRODESIS ANKLE Left 3/4/2019    Procedure: LEFT REVISION ARTHRODESIS ANKLE  AND BUNIONECTOMY ;  Surgeon: Kim Martinez MD;  Location:  OR     ARTHRODESIS FOOT  8/18/2014    Procedure: ARTHRODESIS FOOT;  Surgeon: Kim Martinez MD;  Location: [facility]     ARTHROPLASTY KNEE  5/1/2012    Procedure:ARTHROPLASTY KNEE; LEFT TOTAL KNEE ARTHROPLASTY (DEPUY)^; Surgeon:JOSIAS QUINTANA; Location: OR     ARTHROPLASTY KNEE Right 1/30/2017    Procedure: ARTHROPLASTY KNEE;  Surgeon: Josias Quintana MD;  Location:  OR     ARTHROSCOPY SHOULDER ROTATOR CUFF REPAIR  7/17/2013    Procedure: ARTHROSCOPY SHOULDER ROTATOR CUFF REPAIR;  RIGHT SHOULDER ARTHROSCOPIC ROTATOR CUFF REPAIR, SUBACROMIAL DECOMPRESSION WITH ACROMIOPLASTY, BICEP TENODESIS. ;  Surgeon: Josias Quintana MD;  Location: [facility]     BACK SURGERY  6/2011    spinal cord stimulator removed     BUNIONECTOMY Right 12/7/2015    Procedure: BUNIONECTOMY;  Surgeon: Kim Martinez MD;  Location: [facility]     BUNIONECTOMY Left 3/4/2019    Procedure: Bunionectomy;  Surgeon: Kim Martinez MD;  Location:  OR     COLONOSCOPY       ENT SURGERY  7 years old    tonsillectomy     ESOPHAGOGASTRODUODENOSCOPY       EXCISE CYST GENERIC (LOCATION) Right 3/7/2016    Procedure: EXCISE CYST GENERIC (LOCATION);  Surgeon: Kim Martinez MD;  Location: [facility]     GI SURGERY  in high school    drain pilonidal cyst     GRAFT BONE FROM ILIAC CREST Right 3/7/2016    Procedure: GRAFT BONE FROM  ILIAC CREST;  Surgeon: Kim Martinez MD;  Location: [facility]     HEAD & NECK SURGERY  2/6/15    excision of cyst back of neck     IRRIGATION AND DEBRIDEMENT LOWER EXTREMITY, COMBINED Right 10/9/2017    Procedure: OPEN RIGHT ANKLE MEDIAL GUTTER DEBRIDEMENT ;  Surgeon: Kim Martinez MD;  Location: [facility]     ORTHOPEDIC SURGERY      right ankle replacement     ORTHOPEDIC SURGERY      arthroscopy right knee     ORTHOPEDIC SURGERY      right total ankle with subtalar fusion     ORTHOPEDIC SURGERY  3/05, 9/10    right ankle revision x 2     ORTHOPEDIC SURGERY  2014    L naviculocuneiform fusion, gastrocnemius lengthening and 2nd hammer toe repair     PITUITARY SURGERY  2011    transsphenoidal fenestration of pituitary cyst     REMOVE HARDWARE FOOT Left 2015    Procedure: REMOVE HARDWARE FOOT;  Surgeon: Kim Martinez MD;  Location: [facility]               Social History:   I have reviewed this patient's social history  Social History     Tobacco Use     Smoking status: Former Smoker     Packs/day: 1.50     Years: 20.00     Pack years: 30.00     Types: Cigarettes, Cigars     Last attempt to quit: 2000     Years since quittin.9     Smokeless tobacco: Never Used     Tobacco comment: quit    Substance Use Topics     Alcohol use: No     Comment: 1 drink every 3-4 weeks             Family History:   I have reviewed this patient's family history  No family history on file.          Allergies:     Allergies   Allergen Reactions     Levaquin Other (See Comments)     Swelling in knees and pain in chest, muscle aches.  Was hospitalized at [facility] .             Medications:   I have reviewed this patient's current medications  Medications Prior to Admission   Medication Sig Dispense Refill Last Dose     acetaminophen (TYLENOL) 325 MG tablet Take 3 tablets (975 mg) by mouth every 8 hours 100 tablet 0 Past Week at Unknown time     ASPIRIN PO Take 81 mg by mouth daily   More than a month at  Unknown time     BuPROPion HCl (WELLBUTRIN XL PO) Take 150 mg by mouth daily   3/4/2019 at Unknown time     insulin aspart (NOVOLOG FLEXPEN) 100 UNIT/ML injection Inject 20 Units Subcutaneous 3 times daily (with meals) Base- 20 Units per meal  Add 3 units for every 50 over 150 Blood Glucose reading  If Blood Glucose is :  151-199 = 20 units + 3 = 23 Units  200-249 = 20 Units + 6 = 26 Units  250-299 = 20 units + 9 = 29 Units   3/3/2019 at Unknown time     insulin detemir (LEVEMIR VIAL) 100 UNIT/ML vial Inject 40 Units Subcutaneous every morning (before breakfast)   3/4/2019 at Unknown time     LISINOPRIL PO Take 20 mg by mouth daily    3/4/2019 at Unknown time     ondansetron (ZOFRAN-ODT) 4 MG ODT tab Take 1-2 tablets (4-8 mg) by mouth every 6 hours as needed for nausea 12 tablet 1      order for DME Equipment being ordered: Walker Wheels () and Walker ()  Treatment Diagnosis: impaired gait 1 each 0      oxyCODONE-acetaminophen (PERCOCET) 5-325 MG tablet Take 1-2 tablets by mouth every 4 hours as needed for moderate to severe pain 42 tablet 0      senna-docusate (SENOKOT-S/PERICOLACE) 8.6-50 MG tablet Take 1-2 tablets by mouth 2 times daily 30 tablet 1      sildenafil (VIAGRA) 100 MG tablet Take 100 mg by mouth daily as needed        simvastatin (ZOCOR) 20 MG tablet Take 20 mg by mouth At Bedtime   3/3/2019 at Unknown time     testosterone cypionate (DEPOTESTOSTERONE) 100 MG/ML injection Inject 150 mg into the muscle every 14 days        venlafaxine (EFFEXOR-XR) 150 MG 24 hr capsule Take 150 mg by mouth every evening   3/3/2019 at Unknown time     zolpidem (AMBIEN) 10 MG tablet Take 10 mg by mouth nightly as needed for sleep   Past Week at Unknown time     No current [facility]-ordered facility-administered medications on file.      No current [facility]-ordered outpatient medications on file.             Review of Systems:   The 10 point Review of Systems is negative other than noted in the HPI            Data:   All  laboratory data reviewed  Results for orders placed or performed during the hospital encounter of 12/05/19 (from the past 24 hour(s))   EKG 12 lead   Result Value Ref Range    Interpretation ECG Click View Image link to view waveform and result    CBC with platelets differential   Result Value Ref Range    WBC 10.9 4.0 - 11.0 10e9/L    RBC Count 5.01 4.4 - 5.9 10e12/L    Hemoglobin 16.3 13.3 - 17.7 g/dL    Hematocrit 48.8 40.0 - 53.0 %    MCV 97 78 - 100 fl    MCH 32.5 26.5 - 33.0 pg    MCHC 33.4 31.5 - 36.5 g/dL    RDW 13.3 10.0 - 15.0 %    Platelet Count 181 150 - 450 10e9/L    Diff Method Automated Method     % Neutrophils 65.9 %    % Lymphocytes 22.3 %    % Monocytes 8.5 %    % Eosinophils 2.3 %    % Basophils 0.4 %    % Immature Granulocytes 0.6 %    Nucleated RBCs 0 0 /100    Absolute Neutrophil 7.2 1.6 - 8.3 10e9/L    Absolute Lymphocytes 2.4 0.8 - 5.3 10e9/L    Absolute Monocytes 0.9 0.0 - 1.3 10e9/L    Absolute Eosinophils 0.3 0.0 - 0.7 10e9/L    Absolute Basophils 0.0 0.0 - 0.2 10e9/L    Abs Immature Granulocytes 0.1 0 - 0.4 10e9/L    Absolute Nucleated RBC 0.0    Comprehensive metabolic panel   Result Value Ref Range    Sodium 132 (L) 133 - 144 mmol/L    Potassium 4.4 3.4 - 5.3 mmol/L    Chloride 102 94 - 109 mmol/L    Carbon Dioxide 24 20 - 32 mmol/L    Anion Gap 6 3 - 14 mmol/L    Glucose 396 (H) 70 - 99 mg/dL    Urea Nitrogen 32 (H) 7 - 30 mg/dL    Creatinine 1.61 (H) 0.66 - 1.25 mg/dL    GFR Estimate 43 (L) >60 mL/min/[1.73_m2]    GFR Estimate If Black 49 (L) >60 mL/min/[1.73_m2]    Calcium 9.3 8.5 - 10.1 mg/dL    Bilirubin Total 0.4 0.2 - 1.3 mg/dL    Albumin 4.1 3.4 - 5.0 g/dL    Protein Total 7.9 6.8 - 8.8 g/dL    Alkaline Phosphatase 117 40 - 150 U/L    ALT 45 0 - 70 U/L    AST 21 0 - 45 U/L   Troponin I   Result Value Ref Range    Troponin I ES 0.032 0.000 - 0.045 ug/L   INR   Result Value Ref Range    INR 1.03 0.86 - 1.14   Partial thromboplastin time   Result Value Ref Range    PTT 31 22 - 37  sec   EKG 12 lead   Result Value Ref Range    Interpretation ECG Click View Image link to view waveform and result    XR Chest Port 1 View    Narrative    CHEST PORTABLE ONE VIEW December 5, 2019 8:45 AM     HISTORY: Chest pain.    COMPARISON: Chest CT 12/13/2009.        Impression    IMPRESSION: Cardiac silhouette appears to be within normal limits. No  focal airspace opacities identified although the lung bases are not  visualized. No pneumothorax identified on this portable view.     BENNIE JORDAN MD   Cardiac Catheterization    Narrative      Mid RCA lesion is 100% stenosed.    Prox RCA lesion is 30% stenosed.    Dist RCA lesion is 40% stenosed.    Ost LAD to Prox LAD lesion is 20% stenosed.    Mid LAD lesion is 20% stenosed.    2nd Mrg lesion is 80% stenosed.    IVUS was performed on the lesion.    Pressure wire/FFR was not performed on the lesion.    Ultrasound (IVUS) was performed.    Recommend staged intervention to the LCX        EKG results:   I have reviewed this patient's EKG with the following interpretation:        1 mm STEMI inferiorly     Echocardiology:   pending        Results:

## 2019-12-05 NOTE — PROGRESS NOTES
Critical trop 9.739 post STEMI and cath lab. Pt has residual CP that has not changed since leaving cath lab.

## 2019-12-05 NOTE — ED TRIAGE NOTES
midsternal chest pain - ache feeling - sudden onset after waking up this morning 0700 - nonradiating SOB diaphoretic slight nausea   HX AAA  Pt has been having night sweats for past couple weeks saw PMD schedule for CT tomorrow

## 2019-12-05 NOTE — Clinical Note
The first balloon was inserted into the right coronary artery.Max pressure = 12 nithya. Total duration = 22 seconds.     Max pressure = 16 nithya. Total duration = 34 seconds.    Balloon reinflated a second time: Max pressure = 16 nithya. Total duration = 34 seconds.

## 2019-12-05 NOTE — PHARMACY-ADMISSION MEDICATION HISTORY
Pharmacy Medication History  Admission medication history interview status for the 12/5/2019  admission is complete. See EPIC admission navigator for prior to admission medications     Medication history sources: Patient  Medication history source reliability: Good  Adherence assessment: moderate    Significant changes made to the medication list:  none      Additional medication history information:   none    Medication reconciliation completed by provider prior to medication history? No    Time spent in this activity: 15min      Prior to Admission medications    Medication Sig Last Dose Taking? Auth Provider   ASPIRIN PO Take 81 mg by mouth daily as needed   Yes Reported, Patient   BuPROPion HCl (WELLBUTRIN XL PO) Take 150 mg by mouth daily 12/4/2019 at Unknown time Yes Reported, Patient   insulin aspart (NOVOLOG FLEXPEN) 100 UNIT/ML injection Inject 20 Units Subcutaneous 3 times daily (with meals) Base- 20 Units per meal  Add 3 units for every 50 over 150 Blood Glucose reading  If Blood Glucose is :  151-199 = 20 units + 3 = 23 Units  200-249 = 20 Units + 6 = 26 Units  250-299 = 20 units + 9 = 29 Units 12/4/2019 at Unknown time Yes Reported, Patient   insulin detemir (LEVEMIR VIAL) 100 UNIT/ML vial Inject 40 Units Subcutaneous every morning (before breakfast) 12/5/2019 at Unknown time Yes Reported, Patient   LISINOPRIL PO Take 20 mg by mouth daily  12/4/2019 at Unknown time Yes Reported, Patient   sildenafil (VIAGRA) 100 MG tablet Take 100 mg by mouth daily as needed  Yes Reported, Patient   simvastatin (ZOCOR) 20 MG tablet Take 20 mg by mouth At Bedtime 12/4/2019 at Unknown time Yes Reported, Patient   testosterone cypionate (DEPOTESTOSTERONE) 100 MG/ML injection Inject 150 mg into the muscle every 14 days 11/27/2019 Yes Reported, Patient   zolpidem (AMBIEN) 10 MG tablet Take 10 mg by mouth nightly as needed for sleep  Yes Reported, Patient   order for DME Equipment being ordered: Walker Wheels () and  Carlos Alberto ()  Treatment Diagnosis: impaired gait   Josias Quintana MD

## 2019-12-05 NOTE — H&P
HISTORY & PHYSICAL NOTE  HOSPITALIST SERVICE    Date of Admission:     12/05/2019      PRIMARY CARE PHYSICIAN:  Higinio Green MD      CHIEF COMPLAINT:  Chest pain.      HISTORY OF PRESENT ILLNESS:    Mr. 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 this morning.  The patient reports he has been in his usual state of health until he woke from sleep today around 7:00 a.m. with severe stabbing sharp chest pain at the center of his chest, radiating slightly to the left side of the chest and low back.  He reports he had some abdominal pain along with nausea, shortness of breath, diaphoresis at this time.  He took 2 aspirin, and his wife drove him to the Emergency Department.  He denies any lightheadedness or dizziness.  He does not have a family history of any cardiac problems other than his father who had an abdominal aortic aneurysm.  The patient does not have a personal history of any heart attacks.  He is a former smoker and uses marijuana approximately 1 time per week.  Smoking history, approximately 20-pack years.  No alcohol use.      In the Emergency Department, the patient was hemodynamically stable though hypertensive with blood pressure 160/73.  No hypoxia.  A chest x-ray was negative for acute pathology.  He had 2 EKGs done and the second showing ST elevation in the inferolateral leads with reciprocal ST depression in leads V2 and 3.  The Cath Lab and Cardiology were notified, and the patient was brought immediately for intervention.   He was given 162 mg of aspirin, as well as a heparin bolus, nitroglycerin drip and loaded with Brilinta before arriving to the Cath Lab.  In the Cath Lab, he was found to have 100% stenosis of his mid RCA, which was treated with a pre-stent angioplasty, as well as SHIVA x 1.  He was noted to have 80% occlusion of second marginal, and a staged intervention to the left circumflex at a later time is recommended.  Hospitalist Service was called for admission as Primary Service with Cardiology consultation.      PAST MEDICAL HISTORY:   1.  Abdominal aortic aneurysm.   2.  Arthritis.   3.  Benign prostatic hyperplasia.   4.  Chronic anemia.   5.  Chronic pain syndrome.   6.  Chronic kidney disease.   7.  Complex regional pain syndrome.   8.  Depression.   9.  Insulin-dependent type 2 diabetes.   10.  Erectile dysfunction.   11.  Hepatitis C, status post Harvoni.   12.  Hypertension.   13.  Hypogonadism.   14.  Postpolio syndrome.   15.  Pulmonary hypertension.   16.  Hypercholesterolemia.   17.  Kidney stone.   18.  Chronic ankle pain.   19.  History of spinal cord stimulator, now removed.   20.  Obstructive sleep apnea, noncompliant with CPAP.      PAST SURGICAL HISTORY:     1.  Excision of cyst of neck cyst.   2.  Transsphenoidal fenestration of pituitary cyst.   3.  Drainage of pilonidal cyst.   4.  Tonsillectomy.   5.  Bilateral bunionectomies.   6.  Spinal cord stimulator placement and subsequent removal in .   7.  AAA repair, EVAR .   8.  Multiple surgeries to ankle and feet including hardware placement, multiple revisions, most surgeries with Dr. Martinez.   9.  Left total knee arthroplasty.   10.  Right shoulder rotator cuff repair.      FAMILY HISTORY:    The patient's mother  of lymphoma; father  of abdominal aortic aneurysm when the patient was 14 years old, and paternal grandfather  of alcoholism.  There was no history of heart attacks, cardiac stents, or bypass in the family.      SOCIAL  HISTORY:    The patient is a former smoker of cigars and cigarettes.  He smoked 1-1/2 packs per day for about 20 years, consistent with approximately 30-pack-years and quit in 2002.  He does not use alcohol.  He smokes marijuana approximately 1 time per week.      PRIOR TO ADMISSION MEDICATIONS:    Medications Prior to Admission   Medication Sig Dispense Refill Last Dose     ASPIRIN PO Take 81 mg by mouth daily as needed    More than a month at Unknown time     BuPROPion HCl (WELLBUTRIN XL PO) Take 150 mg by mouth daily   12/4/2019 at Unknown time     insulin aspart (NOVOLOG FLEXPEN) 100 UNIT/ML injection Inject 20 Units Subcutaneous 3 times daily (with meals) Base- 20 Units per meal  Add 3 units for every 50 over 150 Blood Glucose reading  If Blood Glucose is :  151-199 = 20 units + 3 = 23 Units  200-249 = 20 Units + 6 = 26 Units  250-299 = 20 units + 9 = 29 Units   12/4/2019 at Unknown time     insulin detemir (LEVEMIR VIAL) 100 UNIT/ML vial Inject 40 Units Subcutaneous every morning (before breakfast)   12/5/2019 at Unknown time     LISINOPRIL PO Take 20 mg by mouth daily    12/4/2019 at Unknown time     sildenafil (VIAGRA) 100 MG tablet Take 100 mg by mouth daily as needed        simvastatin (ZOCOR) 20 MG tablet Take 20 mg by mouth At Bedtime   12/4/2019 at Unknown time     testosterone cypionate (DEPOTESTOSTERONE) 100 MG/ML injection Inject 150 mg into the muscle every 14 days   11/27/2019     zolpidem (AMBIEN) 10 MG tablet Take 10 mg by mouth nightly as needed for sleep   Past Week at Unknown time     order for DME Equipment being ordered: Walker Wheels () and Walker ()  Treatment Diagnosis: impaired gait 1 each 0        ALLERGIES:  LEVAQUIN.  HE HAS HAD SWELLING TO THE KNEES, MUSCLE ACHES, AND PAIN IN CHEST.      REVIEW OF SYSTEMS:  A 10-point review of systems was conducted and is negative other than as listed in the HPI.      PHYSICAL EXAMINATION:   GENERAL:  The patient is a well-appearing obese  "male in no acute distress.   HEENT:  Extraocular movements intact.  Head is atraumatic, normocephalic.   CARDIOVASCULAR:  Regular rate and rhythm.  No murmurs.  Minimal lower extremity edema at prior ankle surgery sites.  No pretibial edema.   PULMONARY:  Lungs are clear to auscultation bilaterally in the anterolateral fields, as the patient is on bed rest.   ABDOMEN:  Obese but nondistended, bowel sounds present.  No tenderness to palpation or guarding.   MUSCULOSKELETAL:  The patient moves all extremities equally with equal  strength and hip flexor strength.   INTEGUMENTARY:  Skin is warm and dry.  No diaphoresis.      LABORATORY DATA:  As indicated in HPI.      IMAGING:  As indicated in HPI.      ASSESSMENT AND PLAN:    Mr. Arzate \"Olu Lucas is a very pleasant 69-year-old gentleman with multiple medical problems including history of abdominal aortic aneurysm (AAA) status post repair, benign prostatic hyperplasia (BPH), chronic pain syndrome, chronic kidney disease (CKD), depression, insulin-dependent type 2 diabetes, hepatitis C status post treatment, hypertension, post-polio syndrome, pulmonary hypertension, hypercholesterolemia, multiple ankle and foot surgeries related to post-polio syndrome, and sleep apnea noncompliant with CPAP, who presented to the Emergency Department with complaints of acute onset of substernal, stabbing and sharp chest pain.  He was found to have an ST-elevation myocardial infarction (STEMI) and taken directly to the Cath Lab.     1.  ST-elevation myocardial infarction.   2.  Multivessel coronary artery disease, status post angioplasty and drug-eluting stent x 1 to the mid right coronary artery (RCA).   Presented with acute onset of stabbing/sharp substernal pain radiating to the left side of chest and low back around 7:00 a.m.  Associated diaphoresis, shortness of breath, abdominal pain, nausea.  He took 2 aspirin and presented to ED.  Found to have STEMI on EKG.  Chest x-ray " unremarkable.  Basic labs unremarkable other than mild hyponatremia, likely related to glucose of 396, creatinine 1.61 consistent baseline, troponin 0.032.  CBC within normal limits.  PTT, INR within normal limits.  He was taken immediately to the Cath Lab for intervention where he underwent previous angioplasty and drug-eluting stent (SHIVA) x 1 to mid RCA due to 100% stenosis.  He was also found to have second marginal 80% stenosis, and it is recommended for him to have a staged intervention to the left circumflex.   -- Admit to CICU.     -- Cardiology consultation.   -- Plan for staged intervention to LCx in future  -- Continue with statin, beta blocker.   -- Dual antiplatelet therapy (DAPT) with aspirin and Brilinta, loaded in the ED.   -- Telemetry.   -- Echocardiogram.   -- Serial troponins.   -- Cardiac rehabilitation.   -- RD consult for education.     3. Mild Hyponatremia related to hyperglycemia    4.  Hypertension.   5.  Hyperlipidemia.   -- Resume PTA ACEi when verified by pharmacy, already started on beta blockade and simvastatin changed to atorvastatin.     6.  Insulin-dependent type 2 diabetes.   PTA Levemir 40 units subcutaneous every morning along with 20 units of NovoLog with meals 3 times per day plus sliding scale.  He did not take his Levemir this morning due to the acute onset of chest pain.  Glucose in the Emergency Department in the 300 range.  Last HbA1c 02/2019 at 9.1%.   -- Hypoglycemia protocol, medium sliding scale insulin plus start with 20 units of Levemir today given events can increase tomorrow, likely to 30 units tomorrow and monitor.   -- 10 units of NovoLog per meal plus a sliding scale, increased need as needed to PTA dosing.   -- Recheck HbA1c.   -- Four times a day fingerstick checks.     7.  History of AAA status post endovascular aneurysm repair (EVAR) in 2009, stable    8.  Chronic kidney disease.   -- Creatinine 1.61, consistent with baseline 1.5-2.   -- Monitor renal  function.   -- Avoid nephrotoxins.     9.  Depression.  Resume PTA Wellbutrin and Effexor and verified by pharmacy.  We will hold PTA Ambien given events of today.     10. Chronic pain syndrome.   11.  Postpolio syndrome.   12.  Multiple orthopedic surgeries in lower extremities.   -- Resume PTA Percocet 1-2 tablets every 4 hours as needed.     13.  History of sleep apnea was to use CPAP but has not been using since .   -- Also noted to have pulmonary hypertension in the past.   -- Encourage CPAP use, I have ordered for tonight.       Deep venous thrombosis prophylaxis.  Ambulation when off bed rest, expect short stay.     Code Status: Full code.      This patient was discussed with Dr. Velez of the Hospitalist Service who agrees with current plans as outlined above.    Kasie Espino PA-C  Hospitalist [facility]       JAQUAN VELEZ MD       As dictated by KASIE ESPINO PA-C            D: 2019   T: 2019   MT:       Name:     MARIE ARREDONDO   MRN:      [id number]        Account:      [id number]   :      1950        Admitted:     2019                   Document: [id number]       cc: Higinio Velez MD

## 2019-12-06 ENCOUNTER — APPOINTMENT (OUTPATIENT)
Dept: PHYSICAL THERAPY | Facility: CLINIC | Age: 69
DRG: 247 | End: 2019-12-06
Attending: INTERNAL MEDICINE
Payer: MEDICARE

## 2019-12-06 PROBLEM — I25.10 CORONARY ARTERY DISEASE INVOLVING NATIVE CORONARY ARTERY OF NATIVE HEART WITHOUT ANGINA PECTORIS: Status: ACTIVE | Noted: 2019-12-06

## 2019-12-06 LAB
ANION GAP SERPL CALCULATED.3IONS-SCNC: 5 MMOL/L (ref 3–14)
BUN SERPL-MCNC: 27 MG/DL (ref 7–30)
CALCIUM SERPL-MCNC: 9.1 MG/DL (ref 8.5–10.1)
CHLORIDE SERPL-SCNC: 106 MMOL/L (ref 94–109)
CO2 SERPL-SCNC: 23 MMOL/L (ref 20–32)
CREAT SERPL-MCNC: 1.22 MG/DL (ref 0.66–1.25)
ERYTHROCYTE [DISTWIDTH] IN BLOOD BY AUTOMATED COUNT: 13.3 % (ref 10–15)
GFR SERPL CREATININE-BSD FRML MDRD: 60 ML/MIN/{1.73_M2}
GLUCOSE BLDC GLUCOMTR-MCNC: 228 MG/DL (ref 70–99)
GLUCOSE BLDC GLUCOMTR-MCNC: 264 MG/DL (ref 70–99)
GLUCOSE BLDC GLUCOMTR-MCNC: 274 MG/DL (ref 70–99)
GLUCOSE BLDC GLUCOMTR-MCNC: 319 MG/DL (ref 70–99)
GLUCOSE SERPL-MCNC: 245 MG/DL (ref 70–99)
HCT VFR BLD AUTO: 47.6 % (ref 40–53)
HGB BLD-MCNC: 15.8 G/DL (ref 13.3–17.7)
MCH RBC QN AUTO: 32 PG (ref 26.5–33)
MCHC RBC AUTO-ENTMCNC: 33.2 G/DL (ref 31.5–36.5)
MCV RBC AUTO: 97 FL (ref 78–100)
PLATELET # BLD AUTO: 169 10E9/L (ref 150–450)
POTASSIUM SERPL-SCNC: 4.5 MMOL/L (ref 3.4–5.3)
RBC # BLD AUTO: 4.93 10E12/L (ref 4.4–5.9)
SODIUM SERPL-SCNC: 134 MMOL/L (ref 133–144)
WBC # BLD AUTO: 11.9 10E9/L (ref 4–11)

## 2019-12-06 PROCEDURE — 97161 PT EVAL LOW COMPLEX 20 MIN: CPT | Mod: GP

## 2019-12-06 PROCEDURE — 21000001 ZZH R&B HEART CARE

## 2019-12-06 PROCEDURE — 85027 COMPLETE CBC AUTOMATED: CPT | Performed by: INTERNAL MEDICINE

## 2019-12-06 PROCEDURE — 80048 BASIC METABOLIC PNL TOTAL CA: CPT | Performed by: INTERNAL MEDICINE

## 2019-12-06 PROCEDURE — 25000132 ZZH RX MED GY IP 250 OP 250 PS 637: Mod: GY | Performed by: INTERNAL MEDICINE

## 2019-12-06 PROCEDURE — 25000131 ZZH RX MED GY IP 250 OP 636 PS 637: Mod: GY | Performed by: PHYSICIAN ASSISTANT

## 2019-12-06 PROCEDURE — 25000132 ZZH RX MED GY IP 250 OP 250 PS 637: Mod: GY | Performed by: HOSPITALIST

## 2019-12-06 PROCEDURE — 97110 THERAPEUTIC EXERCISES: CPT | Mod: GP

## 2019-12-06 PROCEDURE — 25000132 ZZH RX MED GY IP 250 OP 250 PS 637: Mod: GY | Performed by: PHYSICIAN ASSISTANT

## 2019-12-06 PROCEDURE — 25000131 ZZH RX MED GY IP 250 OP 636 PS 637: Mod: GY | Performed by: INTERNAL MEDICINE

## 2019-12-06 PROCEDURE — 99233 SBSQ HOSP IP/OBS HIGH 50: CPT | Performed by: INTERNAL MEDICINE

## 2019-12-06 PROCEDURE — 00000146 ZZHCL STATISTIC GLUCOSE BY METER IP

## 2019-12-06 PROCEDURE — 36415 COLL VENOUS BLD VENIPUNCTURE: CPT | Performed by: INTERNAL MEDICINE

## 2019-12-06 PROCEDURE — 97530 THERAPEUTIC ACTIVITIES: CPT | Mod: GP

## 2019-12-06 RX ORDER — CLOPIDOGREL BISULFATE 75 MG/1
75 TABLET ORAL DAILY
Status: DISCONTINUED | OUTPATIENT
Start: 2019-12-06 | End: 2019-12-06

## 2019-12-06 RX ORDER — ASPIRIN 81 MG/1
81 TABLET ORAL DAILY
Status: DISCONTINUED | OUTPATIENT
Start: 2019-12-06 | End: 2019-12-07 | Stop reason: HOSPADM

## 2019-12-06 RX ORDER — ZOLPIDEM TARTRATE 5 MG/1
5 TABLET ORAL ONCE
Status: COMPLETED | OUTPATIENT
Start: 2019-12-06 | End: 2019-12-06

## 2019-12-06 RX ORDER — LISINOPRIL 40 MG/1
40 TABLET ORAL DAILY
Status: DISCONTINUED | OUTPATIENT
Start: 2019-12-07 | End: 2019-12-07 | Stop reason: HOSPADM

## 2019-12-06 RX ADMIN — TICAGRELOR 90 MG: 90 TABLET ORAL at 10:12

## 2019-12-06 RX ADMIN — BUPROPION HYDROCHLORIDE 150 MG: 150 TABLET, FILM COATED, EXTENDED RELEASE ORAL at 10:12

## 2019-12-06 RX ADMIN — INSULIN DETEMIR 12 UNITS: 100 INJECTION, SOLUTION SUBCUTANEOUS at 14:11

## 2019-12-06 RX ADMIN — ATORVASTATIN CALCIUM 80 MG: 80 TABLET, FILM COATED ORAL at 10:11

## 2019-12-06 RX ADMIN — LISINOPRIL 20 MG: 20 TABLET ORAL at 10:12

## 2019-12-06 RX ADMIN — ZOLPIDEM TARTRATE 5 MG: 5 TABLET, FILM COATED ORAL at 22:13

## 2019-12-06 RX ADMIN — ASPIRIN 81 MG: 81 TABLET, DELAYED RELEASE ORAL at 10:12

## 2019-12-06 RX ADMIN — TICAGRELOR 90 MG: 90 TABLET ORAL at 21:00

## 2019-12-06 RX ADMIN — METOPROLOL TARTRATE 25 MG: 25 TABLET ORAL at 10:12

## 2019-12-06 RX ADMIN — INSULIN DETEMIR 28 UNITS: 100 INJECTION, SOLUTION SUBCUTANEOUS at 11:15

## 2019-12-06 RX ADMIN — METOPROLOL TARTRATE 25 MG: 25 TABLET ORAL at 21:00

## 2019-12-06 RX ADMIN — INSULIN ASPART 4 UNITS: 100 INJECTION, SOLUTION INTRAVENOUS; SUBCUTANEOUS at 13:14

## 2019-12-06 RX ADMIN — INSULIN ASPART 3 UNITS: 100 INJECTION, SOLUTION INTRAVENOUS; SUBCUTANEOUS at 18:12

## 2019-12-06 NOTE — PLAN OF CARE
Alert and oriented x4. VSS on RA. STEMI, SHIVA to RCA. R and L groin sites wdl, cms intact. Off bedrest at 2130, up independently in the room. Plan for ECHO and CR. Will continue to monitor.

## 2019-12-06 NOTE — PLAN OF CARE
VSS. Tele: SR/SD. Bilateral groin site CDI, no bruit, good CMS and post-tib pulses. On bedrest until 2130. Wife at bedside. Plan for staged intervention to circ in future. Echo tomorrow. Will continue to monitor.

## 2019-12-06 NOTE — PLAN OF CARE
Discharge Planner PT   Patient plan for discharge: Home  Current status: Pt is 69 year old male adm on 12/5/19 with chest pain, found to have STEMI, underwent angio with stent placed to RCA with staged intervention planned to LCx likely as outpatient. At baseline, pt lives with spouse, split level home with 7+7 stairs, ambulates without assistive device. PT/CR orders received, chart reviewed, evaluation completed and treatment initiated. Pt is independent with bed mobility, transfers, and ambulation. Pt able to negotiate 10 stairs without assistance. Pt mendez ambulation at speed 0.6mph on treadmill for 8 minutes, OMNI rating 5/10, reports fatigue.  Barriers to return to prior living situation: None from a mobility standpoint  Recommendations for discharge: Home with outpatient cardiac rehab  Rationale for recommendations: Pt is able to complete mobility necessary for discharge to home. Recommend outpatient cardiac rehab to further progress aerobic activity level in monitored setting and for education to promote optimal heart health.       Entered by: Pita Peterson 12/06/2019 12:14 PM

## 2019-12-06 NOTE — CONSULTS
Medication coverage check for Brilinta.    Patient already has high drug costs due to insulin dependency. Costs are as follows only if patient does not change plans.    January: $481 in order to meet deductible.  Feb-April: $46 monthly  May-November: $96 monthly  December: $19    Terese Linder CphT  [facility] Liaison  Liaison Cell: [phone]

## 2019-12-06 NOTE — PROGRESS NOTES
"[facility] Cardiology Progress Note  Date of Service: 12/06/2019  Primary Cardiologist: Will be Dr. Farmer.     Huey Lucas is a 69 year old male admitted on 12/5/2019 with STEMI.    Subjective: Feeling \"much better.\" No recurrent chest pain. Bruising but no significant discomfort at L groin access site.     Assessment:  1. CAD / STEMI, s/p PCI of culprit RCA on 12/5. Residual 80% OM lesion which will require staged intervention. Trop peak at 46. On appropriate medical therapy. TTE pending. No arrhythmias overnight.  2. AAA, s/p EVAR in 2009  3. CKD - creat at baseline  4. HTN - mildly uncontrolled  5. HLP - LDL 54. Switched to high intensity statin.   6. DMII - uncontrolled (a1c 11.3). On insulin.     Plan:   1. Cardiac rehab.   2. Will review TTE today.   3. Increase lisinopril from 20 to 40 mg daily.   4. Likely discharge tomorrow.   5. Follow up arranged with SEAN Dorman, on 12/13/19 with labs prior. Staged procedure arranged for 12/16.  6. Due to high cost, would consider switching Brilinta to Plavix in 1 month.     Terese Antonio PA-C  [facility]  Pager: [phone]  Text Page  (7:30am - 4pm M-F)   __________________________________________________________________________    Physical Exam   Temp: 97.6  F (36.4  C) Temp src: Oral BP: (!) 127/96 Pulse: 68 Heart Rate: 93 Resp: 15 SpO2: 97 % O2 Device: None (Room air)    Vitals:    12/05/19 0807   Weight: 113.4 kg (250 lb)       GENERAL:  The patient is in no apparent distress.   NECK: CVP appears normal, no masses or thyromegaly.  PULMONARY:  There is a normal respiratory effort. Clear lungs to auscultation bilaterally.   CARDIOVASCULAR:  RRR, normal S1 S2, no m/r/g.  GI:  Non tender abdomen with normoactive bowel sounds and no hepatosplenomegaly. There are no masses palpable.   EXTREMITIES:  No clubbing, cyanosis or edema.  VASCULAR: 2+ Pulses bilaterally in upper and lower extremities. L femoral access site covered, " with some old discharge saturating a small area of gauze, moderate amount of ecchymosis surrounding. No hematoma, bruit or thrill appreciated.      Medications     Continuing ACE inhibitor/ARB/ARNI from home medication list OR ACE inhibitor/ARB order already placed during this visit       Percutaneous Coronary Intervention orders placed (this is information for BPA alerting)         aspirin  81 mg Oral Daily     atorvastatin  80 mg Oral Daily     buPROPion  150 mg Oral Daily     insulin aspart  1-7 Units Subcutaneous TID AC     insulin aspart  1-5 Units Subcutaneous At Bedtime     insulin aspart  10 Units Subcutaneous TID w/meals     insulin detemir  28 Units Subcutaneous QAM AC     lisinopril  20 mg Oral Daily     metoprolol tartrate  25 mg Oral BID     ticagrelor  90 mg Oral Q12H       Data   Most Recent 3 CBC's:  Recent Labs   Lab Test 12/06/19 0515 12/05/19 0820 02/02/17  0620 02/01/17  0623  05/02/12  0604   WBC 11.9* 10.9  --   --   --  10.9   HGB 15.8 16.3  --  10.2*   < > 10.1*   MCV 97 97  --   --   --  100    181 141*  --    < > 170    < > = values in this interval not displayed.     Most Recent 3 BMP's:  Recent Labs   Lab Test 12/06/19 0515 12/05/19 0820 02/02/17  0620 01/31/17  0648  05/03/12  0543    132*  --   --   --  137   POTASSIUM 4.5 4.4  --   --   --  4.2   CHLORIDE 106 102  --   --   --  103   CO2 23 24  --   --   --  26   BUN 27 32*  --   --   --  22   CR 1.22 1.61* 1.76*  --    < > 1.06   ANIONGAP 5 6  --   --   --  7   REZA 9.1 9.3  --   --   --  8.7   * 396*  --  158*  --  180*    < > = values in this interval not displayed.     Most Recent 3 Troponin's:  Recent Labs   Lab Test 12/05/19  2146 12/05/19  1337 12/05/19  1023   TROPI 30.720* 46.334* 9.739*     Most Recent Cholesterol Panel:  Recent Labs   Lab Test 12/05/19  0820   CHOL 146   LDL 54   HDL 34*   TRIG 289*     Most Recent TSH and T4:No lab results found.  Most Recent Hemoglobin A1c:  Recent Labs   Lab Test  12/05/19  0820   A1C 11.3*

## 2019-12-06 NOTE — PROGRESS NOTES
[facility]    Hospitalist Progress Note    Assessment & Plan   Huey Lucas is a 69 year old male who was admitted on 12/5/2019.  Patient has multiple medical problems presented with will ST elevation MI and was directly taken to Cath Lab.  ST elevation MI  Multivessel CAD  Hypertension  Hyperlipidemia  --Status post angiogram and PCI of /5, there is residual 80% H OM lesion for which he might need a staged intervention, plan to do this at a different setting or outpatient.  --Appreciate cardiology input, continue statin, beta-blocker, aspirin and Brilinta  --Lisinopril dose increased by cardiology to 40 mg, plan for outpatient follow-up on discharge continue cardiac rehabilitation, echocardiogram pending following angiogram.  --Possible discharge home tomorrow.  Insulin-dependent type 2 diabetes uncontrolled  --He takes PTA Levemir 40 units subcu every morning with 20 units NovoLog 3 times daily with meals plus sliding scale latest hemoglobin is 9.1% prior to admission, the 1 repeated here shows 11.3.  --Blood sugars are uncontrolled today, will have to do adjustments to his pre-meal insulin continue his Levemir and sliding scale.  History of AAA status post endovascular aneurysm repair in 2009-stable  CKD  --Baseline creatinine is 1.5-2  --Creatinine today is 1.2 will repeat again in a.m.  Rest of the medical problems including chronic pain syndrome, postpolio syndrome, multiple orthopedic surgeries in the lower extremities, depression, history of sleep apnea without any ongoing CPAP use stable  DVT Prophylaxis: Ambulate  Code Status: Prior     Disposition: Expected discharge possibly 12/7    Kiesha Flores MD  Text Page   (7am to 6pm)    Interval History   Patient resting comfortably, tolerating diet well, have not participated in cardiac rehab yet, he is denying any chest pain no shortness of breath now.  No other issues noted overnight.    -Data reviewed today: I reviewed all new  labs and imaging results over the last 24 hours.  Physical Exam   Temp: 97.6  F (36.4  C) Temp src: Oral BP: (!) 127/96 Pulse: 68 Heart Rate: 93 Resp: 15 SpO2: 97 % O2 Device: None (Room air)    Vitals:    12/05/19 0807   Weight: 113.4 kg (250 lb)     Vital Signs with Ranges  Temp:  [97.3  F (36.3  C)-97.6  F (36.4  C)] 97.6  F (36.4  C)  Pulse:  [57-75] 68  Heart Rate:  [59-93] 93  Resp:  [5-25] 15  BP: (116-146)/() 127/96  SpO2:  [94 %-100 %] 97 %  I/O last 3 completed shifts:  In: 720 [P.O.:720]  Out: 1275 [Urine:1275]    Constitutional: Awake, alert, cooperative, no apparent distress  Respiratory: Clear to auscultation bilaterally, no crackles or wheezing  Cardiovascular: Regular rate and rhythm, normal S1 and S2, and no murmur noted  GI: Normal bowel sounds, soft, non-distended, non-tender  Skin/Integumen: No rashes, no cyanosis, no edema  Neuro : moving all 4 extremities, no focal deficit noted     Medications     Continuing ACE inhibitor/ARB/ARNI from home medication list OR ACE inhibitor/ARB order already placed during this visit       Percutaneous Coronary Intervention orders placed (this is information for BPA alerting)         aspirin  81 mg Oral Daily     atorvastatin  80 mg Oral Daily     buPROPion  150 mg Oral Daily     insulin aspart  1-7 Units Subcutaneous TID AC     insulin aspart  1-5 Units Subcutaneous At Bedtime     insulin aspart  10 Units Subcutaneous TID w/meals     insulin detemir  28 Units Subcutaneous QAM AC     [START ON 12/7/2019] lisinopril  40 mg Oral Daily     metoprolol tartrate  25 mg Oral BID     ticagrelor  90 mg Oral Q12H       Data   Recent Labs   Lab 12/06/19  0515 12/05/19  2146 12/05/19  1337 12/05/19  1023 12/05/19  0820   WBC 11.9*  --   --   --  10.9   HGB 15.8  --   --   --  16.3   MCV 97  --   --   --  97     --   --   --  181   INR  --   --   --   --  1.03     --   --   --  132*   POTASSIUM 4.5  --   --   --  4.4   CHLORIDE 106  --   --   --  102   CO2  23  --   --   --  24   BUN 27  --   --   --  32*   CR 1.22  --   --   --  1.61*   ANIONGAP 5  --   --   --  6   REZA 9.1  --   --   --  9.3   *  --   --   --  396*   ALBUMIN  --   --   --   --  4.1   PROTTOTAL  --   --   --   --  7.9   BILITOTAL  --   --   --   --  0.4   ALKPHOS  --   --   --   --  117   ALT  --   --   --   --  45   AST  --   --   --   --  21   TROPI  --  30.720* 46.334* 9.739* 0.032     Recent Labs   Lab 12/06/19  0515 12/06/19  0134 12/05/19  2048 12/05/19  1643 12/05/19  1128 12/05/19  0820   *  --   --   --   --  396*   BGM  --  228* 251* 215* 363*  --        Imaging:   No results found for this or any previous visit (from the past 24 hour(s)).

## 2019-12-06 NOTE — PROGRESS NOTES
12/06/19 1101   Quick Adds   Type of Visit Initial PT Evaluation   Living Environment   Lives With spouse   Living Arrangements house   Home Accessibility stairs to enter home;stairs within home   Number of Stairs, Main Entrance 3   Stair Railings, Main Entrance railings safe and in good condition   Number of Stairs, Within Home, Primary other (see comments)  (7+7)   Stair Railings, Within Home, Primary railings safe and in good condition   Transportation Anticipated car, drives self;family or friend will provide   Living Environment Comment Pt lives in split level home, a total of 14 stairs   Self-Care   Usual Activity Tolerance good   Current Activity Tolerance moderate   Regular Exercise Yes   Activity/Exercise Type walking   Exercise Amount/Frequency 3-5 times/wk;30 mins   Equipment Currently Used at Home none   Activity/Exercise/Self-Care Comment Pt had foot surgery in April but is no longer using assistive devices. Pt reports he tries to walk 3-5x/week and plays golf but has been having decreased activity tolerance and has been doing less activity.   Functional Level Prior   Ambulation 0-->independent   Transferring 0-->independent   Fall history within last six months no   Prior Functional Level Comment Independent without use of assistive device   General Information   Onset of Illness/Injury or Date of Surgery - Date 12/05/19   Referring Physician Howard Santana MD   Patient/Family Goals Statement to get better   Pertinent History of Current Problem (include personal factors and/or comorbidities that impact the POC) Pt 9s 69 year old male adm on 12/5/19 with chest pain, found to have STEMI and emergently to cath lab with stent placed to RCA. Staged intervention planned to LCx. PMH includes DM, HTN, incr chol, and AAA   Precautions/Limitations no known precautions/limitations   Cognitive Status Examination   Orientation orientation to person, place and time   Level of Consciousness alert   Pain  "Assessment   Patient Currently in Pain No   Integumentary/Edema   Integumentary/Edema no deficits were identifed   Posture    Posture Forward head position   Range of Motion (ROM)   ROM Comment B LE ROM WFL   Strength   Strength Comments B LE strength WFL   Bed Mobility   Bed Mobility Comments Independent   Transfer Skills   Transfer Comments Independent   Gait   Gait Comments Independent, slow pace and compensated gait on R due to weak ankle dorsiflexors, this is pt's baseline due to previous foot and ankle surgeries after having polio as a child.   Balance   Balance Comments Fair, pt with decreased standing balance which pt reports is baseline and he had been attending outpatient PT to address deficits.   Sensory Examination   Sensory Perception Comments Denies numbness/tingling   General Therapy Interventions   Planned Therapy Interventions home program guidelines;progressive activity/exercise;risk factor education   Clinical Impression   Criteria for Skilled Therapeutic Intervention yes, treatment indicated   PT Diagnosis Impaired activity tolerance   Influenced by the following impairments Decreased balance, decreased endurance   Functional limitations due to impairments Decreased ability to participate in daily tasks   Clinical Presentation Stable/Uncomplicated   Clinical Presentation Rationale Current presentation, [facility]   Clinical Decision Making (Complexity) Low complexity   Therapy Frequency 2x/day   Predicted Duration of Therapy Intervention (days/wks) 2 days   Anticipated Discharge Disposition Home with Outpatient Therapy   Risk & Benefits of therapy have been explained Yes   Patient, Family & other staff in agreement with plan of care Yes   [facility] [employer] TM \"6 Clicks\"   2016, Trustees of [facility], under license to [employer].  All rights reserved.   6 Clicks Short Forms Basic Mobility Inpatient Short Form   [facility] AM-PAC  \"6 Clicks\" V.2 Basic Mobility Inpatient Short Form "   1. Turning from your back to your side while in a flat bed without using bedrails? 4 - None   2. Moving from lying on your back to sitting on the side of a flat bed without using bedrails? 4 - None   3. Moving to and from a bed to a chair (including a wheelchair)? 4 - None   4. Standing up from a chair using your arms (e.g., wheelchair, or bedside chair)? 4 - None   5. To walk in hospital room? 4 - None   6. Climbing 3-5 steps with a railing? 4 - None   Basic Mobility Raw Score (Score out of 24.Lower scores equate to lower levels of function) 24   Total Evaluation Time   Total Evaluation Time (Minutes) 10

## 2019-12-07 ENCOUNTER — APPOINTMENT (OUTPATIENT)
Dept: PHYSICAL THERAPY | Facility: CLINIC | Age: 69
DRG: 247 | End: 2019-12-07
Payer: MEDICARE

## 2019-12-07 ENCOUNTER — APPOINTMENT (OUTPATIENT)
Dept: CARDIOLOGY | Facility: CLINIC | Age: 69
DRG: 247 | End: 2019-12-07
Attending: INTERNAL MEDICINE
Payer: MEDICARE

## 2019-12-07 VITALS
BODY MASS INDEX: 34.86 KG/M2 | TEMPERATURE: 98.4 F | SYSTOLIC BLOOD PRESSURE: 112 MMHG | HEIGHT: 71 IN | DIASTOLIC BLOOD PRESSURE: 78 MMHG | WEIGHT: 249 LBS | HEART RATE: 86 BPM | OXYGEN SATURATION: 98 % | RESPIRATION RATE: 16 BRPM

## 2019-12-07 LAB
ANION GAP SERPL CALCULATED.3IONS-SCNC: 4 MMOL/L (ref 3–14)
BUN SERPL-MCNC: 32 MG/DL (ref 7–30)
CALCIUM SERPL-MCNC: 9 MG/DL (ref 8.5–10.1)
CHLORIDE SERPL-SCNC: 104 MMOL/L (ref 94–109)
CO2 SERPL-SCNC: 26 MMOL/L (ref 20–32)
CREAT SERPL-MCNC: 1.54 MG/DL (ref 0.66–1.25)
ERYTHROCYTE [DISTWIDTH] IN BLOOD BY AUTOMATED COUNT: 13.4 % (ref 10–15)
GFR SERPL CREATININE-BSD FRML MDRD: 45 ML/MIN/{1.73_M2}
GLUCOSE BLDC GLUCOMTR-MCNC: 185 MG/DL (ref 70–99)
GLUCOSE BLDC GLUCOMTR-MCNC: 214 MG/DL (ref 70–99)
GLUCOSE BLDC GLUCOMTR-MCNC: 215 MG/DL (ref 70–99)
GLUCOSE BLDC GLUCOMTR-MCNC: 363 MG/DL (ref 70–99)
GLUCOSE BLDC GLUCOMTR-MCNC: 417 MG/DL (ref 70–99)
GLUCOSE SERPL-MCNC: 219 MG/DL (ref 70–99)
HCT VFR BLD AUTO: 48.7 % (ref 40–53)
HGB BLD-MCNC: 16 G/DL (ref 13.3–17.7)
MCH RBC QN AUTO: 32.2 PG (ref 26.5–33)
MCHC RBC AUTO-ENTMCNC: 32.9 G/DL (ref 31.5–36.5)
MCV RBC AUTO: 98 FL (ref 78–100)
PLATELET # BLD AUTO: 165 10E9/L (ref 150–450)
POTASSIUM SERPL-SCNC: 4.8 MMOL/L (ref 3.4–5.3)
RBC # BLD AUTO: 4.97 10E12/L (ref 4.4–5.9)
SODIUM SERPL-SCNC: 134 MMOL/L (ref 133–144)
WBC # BLD AUTO: 11.5 10E9/L (ref 4–11)

## 2019-12-07 PROCEDURE — 99239 HOSP IP/OBS DSCHRG MGMT >30: CPT | Performed by: INTERNAL MEDICINE

## 2019-12-07 PROCEDURE — 40000264 ECHOCARDIOGRAM COMPLETE

## 2019-12-07 PROCEDURE — 93306 TTE W/DOPPLER COMPLETE: CPT | Mod: 26 | Performed by: INTERNAL MEDICINE

## 2019-12-07 PROCEDURE — 25000132 ZZH RX MED GY IP 250 OP 250 PS 637: Mod: GY | Performed by: PHYSICIAN ASSISTANT

## 2019-12-07 PROCEDURE — 36415 COLL VENOUS BLD VENIPUNCTURE: CPT | Performed by: INTERNAL MEDICINE

## 2019-12-07 PROCEDURE — 25000131 ZZH RX MED GY IP 250 OP 636 PS 637: Mod: GY | Performed by: INTERNAL MEDICINE

## 2019-12-07 PROCEDURE — 85027 COMPLETE CBC AUTOMATED: CPT | Performed by: INTERNAL MEDICINE

## 2019-12-07 PROCEDURE — 25000132 ZZH RX MED GY IP 250 OP 250 PS 637: Mod: GY | Performed by: INTERNAL MEDICINE

## 2019-12-07 PROCEDURE — 25500064 ZZH RX 255 OP 636: Performed by: INTERNAL MEDICINE

## 2019-12-07 PROCEDURE — 00000146 ZZHCL STATISTIC GLUCOSE BY METER IP

## 2019-12-07 PROCEDURE — 97110 THERAPEUTIC EXERCISES: CPT | Mod: GP

## 2019-12-07 PROCEDURE — 80048 BASIC METABOLIC PNL TOTAL CA: CPT | Performed by: INTERNAL MEDICINE

## 2019-12-07 RX ORDER — LISINOPRIL 40 MG/1
40 TABLET ORAL DAILY
Qty: 30 TABLET | Refills: 1 | Status: SHIPPED | OUTPATIENT
Start: 2019-12-08 | End: 2019-12-13

## 2019-12-07 RX ORDER — ATORVASTATIN CALCIUM 80 MG/1
80 TABLET, FILM COATED ORAL DAILY
Qty: 30 TABLET | Refills: 1 | Status: SHIPPED | OUTPATIENT
Start: 2019-12-08

## 2019-12-07 RX ORDER — METOPROLOL SUCCINATE 50 MG/1
50 TABLET, EXTENDED RELEASE ORAL DAILY
Qty: 30 TABLET | Refills: 0 | Status: SHIPPED | OUTPATIENT
Start: 2019-12-07

## 2019-12-07 RX ADMIN — TICAGRELOR 90 MG: 90 TABLET ORAL at 08:46

## 2019-12-07 RX ADMIN — INSULIN ASPART 6 UNITS: 100 INJECTION, SOLUTION INTRAVENOUS; SUBCUTANEOUS at 12:46

## 2019-12-07 RX ADMIN — ASPIRIN 81 MG: 81 TABLET, DELAYED RELEASE ORAL at 08:45

## 2019-12-07 RX ADMIN — LISINOPRIL 40 MG: 40 TABLET ORAL at 08:46

## 2019-12-07 RX ADMIN — HUMAN ALBUMIN MICROSPHERES AND PERFLUTREN 9 ML: 10; .22 INJECTION, SOLUTION INTRAVENOUS at 11:15

## 2019-12-07 RX ADMIN — INSULIN DETEMIR 40 UNITS: 100 INJECTION, SOLUTION SUBCUTANEOUS at 08:46

## 2019-12-07 RX ADMIN — METOPROLOL TARTRATE 25 MG: 25 TABLET ORAL at 08:45

## 2019-12-07 RX ADMIN — ATORVASTATIN CALCIUM 80 MG: 80 TABLET, FILM COATED ORAL at 08:46

## 2019-12-07 RX ADMIN — BUPROPION HYDROCHLORIDE 150 MG: 150 TABLET, FILM COATED, EXTENDED RELEASE ORAL at 08:46

## 2019-12-07 RX ADMIN — INSULIN ASPART 2 UNITS: 100 INJECTION, SOLUTION INTRAVENOUS; SUBCUTANEOUS at 08:47

## 2019-12-07 ASSESSMENT — MIFFLIN-ST. JEOR: SCORE: 1911.59

## 2019-12-07 NOTE — PROVIDER NOTIFICATION
MD Notification    Notified Person: MD    Notified Person Name: Dr. Flores     Notification Date/Time: 12/7 @ 12:25 PM     Notification Interaction: Text-paged     Purpose of Notification: Patient with blood glucose level of 417 @ lunchtime     Orders Received:  Give coverage with meal of sliding scale and 20 units of meal coverage (26 units of novolog total) and recheck blood sugar x1 hour after. Patient has poor blood sugar control based on hgb A1C, this could be patient's baseline per MD     Comments:

## 2019-12-07 NOTE — PROVIDER NOTIFICATION
MD Notification    Notified Person: MD    Notified Person Name:     Notification Date/Time: 12/6/19 @ 0609    Notification Interaction: paged physician    Purpose of Notification: patient requesting PTA Ambien    Orders Received: one time dose ordered.     Comments:

## 2019-12-07 NOTE — PROVIDER NOTIFICATION
MD Notification    Notified Person: MD    Notified Person Name: Dr. Scott     Notification Date/Time: 12/7 @ 2:00 PM     Notification Interaction: Text-paged     Purpose of Notification: Discharge orders in for patient, okay to discharge from cardiology standpoint?     Orders Received: Cardiology has signed off     Comments:

## 2019-12-07 NOTE — DISCHARGE INSTRUCTIONS
Cardiac Angiogram Discharge Instructions - Femoral    After you go home:      Have an adult stay with you until tomorrow.    Drink extra fluids for 2 days.    You may resume your normal diet.    No smoking       For 24 hours - due to the sedation you received:    Relax and take it easy.    Do NOT make any important or legal decisions.    Do NOT drive or operate machines at home or at work.    Do NOT drink alcohol.    Care of Groin Puncture Site:      For the first 24 hrs - check the puncture site every 1-2 hours while awake.    For 2 days, when you cough, sneeze, laugh or move your bowels, hold your hand over the puncture site and press firmly.    Remove the bandaid after 24 hours. If there is minor oozing, apply another bandaid and remove it after 12 hours.    It is normal to have a small bruise or pea size lump at the site.    You may shower tomorrow. Do NOT take a bath, or use a hot tub or pool for at least 3 days. Do NOT scrub the site. Do not use lotion or powder near the puncture site.    Activity:            For 2 days:    No stooping or squatting    Do NOT do any heavy activity such as exercise, lifting, or straining.     No housework, yard work or any activity that make you sweat    Do NOT lift more than 10 pounds    Bleeding:      If you start bleeding from the site in your groin, lie down flat and press firmly on/above the site for 10 minutes.     Once bleeding stops, lay flat for 2 hours.     Call [facility] as soon as you can.       Call 911 right away if you have heavy bleeding or bleeding that does not stop.      Medicines:      If you are taking an antiplatelet medication such as Plavix, Brilinta or Effient, do not stop taking it until you talk to your cardiologist.      If you are on Metformin (Glucophage), do not restart it until you have blood tests (within 2 to 3 days after discharge).  After you have your blood drawn, you may restart the Metformin.     Take your medications, including blood  thinners, unless your provider tells you not to.  If you take Coumadin (Warfarin), have your INR checked by your provider in  3-5 days. Call your clinic to schedule this.    If you have stopped any medicines, check with your provider about when to restart them.    Follow Up Appointments:      Follow up with [facility] [place] [facility] Nurse Practitioner at [facility] [place] of patient preference in 7-10 days.    Call the clinic if:      You have increased pain or a large or growing hard lump around the site.    The site is red, swollen, hot or tender.    Blood or fluid is draining from the site.    You have chills or a fever greater than 101 F (38 C).    Your leg feels numb, cool or changes color.    You have hives, a rash or unusual itching.    New pain in the back or belly that you cannot control with Tylenol.    Any questions or concerns.          [facility] [place]:    [phone] [facility] [place] (7 days a week)

## 2019-12-07 NOTE — PLAN OF CARE
A&O. VSS on room air. Tele: NSR, HR 70-80's. Denies CP and SOB. Independent in room. Bilateral groin sites ecchymotic, intact. CMS intact, good pedal pulses except R dorsalis weak at baseline. Continue cardiac rehab. Insulin adjusted d/t elevated blood sugars, increased Levemir to 40 Units per day, novolog 20 units with meals. Lisinopril increased from 20 mg to 40 mg daily. Plan to discharge tomorrow and return with staged intervention of OM.

## 2019-12-07 NOTE — PLAN OF CARE
Alert and oriented x4. Denies any pain/sob. VSS on RA. Up independently in the room. Tele SR, BBB. Plan for discharge to home today. Will continue to monitor.

## 2019-12-07 NOTE — DISCHARGE SUMMARY
[facility]    Discharge Summary  Hospitalist    Date of Admission:  12/5/2019  Date of Discharge:  12/7/2019  Discharging Provider: Kiesha Flores MD    Discharge Diagnoses   stemi    History of Present Illness   Mr. 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 this morning.  The patient reports he has been in his usual state of health until he woke from sleep today around 7:00 a.m. with severe stabbing sharp chest pain at the center of his chest, radiating slightly to the left side of the chest and low back.  He reports he had some abdominal pain along with nausea, shortness of breath, diaphoresis at this time.  He took 2 aspirin, and his wife drove him to the Emergency Department.  He denies any lightheadedness or dizziness.  He does not have a family history of any cardiac problems other than his father who had an abdominal aortic aneurysm.  The patient does not have a personal history of any heart attacks.  He is a former smoker and uses marijuana approximately 1 time per week.  Smoking history, approximately 20-pack years.  No alcohol use.     Hospital Course   Huey Lucas was admitted on 12/5/2019.  The following problems were addressed during his hospitalization:    Active Problems:    STEMI (ST elevation myocardial infarction) (H)    Huey Lucas is a 69 year old male who was admitted on 12/5/2019.  Patient has multiple medical problems presented with will ST elevation MI and was directly taken to Cath  Lab.  ST elevation MI  Multivessel CAD  Hypertension  Hyperlipidemia  Status post angiogram and PCI of /5, there is residual 80% H OM lesion for which he might need a staged intervention, plan to do this at a different setting or outpatient.  He was seen by cardiology, plan is for outpatient staged PCI, currently scheduled, his medications were adjusted after angiogram, plan is to continue statin, beta-blocker, aspirin, Brilinta, lisinopril dose increased to 40 mg . metoprolol dose changed to Toprol-XL 50 mg as per cardiology recommendations.  He had echocardiogram done following that which was reviewed by cardiology.    Insulin-dependent type 2 diabetes uncontrolled  --He takes PTA Levemir 40 units subcu every morning with 20 units NovoLog 3 times daily with meals plus sliding scale latest hemoglobin is 9.1% prior to admission, the 1 repeated here shows 11.3.  His blood sugars remained uncontrolled during his stay here, his hemoglobin A1c indicates poor control.  Continue his home regimen for now and follow-up with PCP for further adjustments.    History of AAA status post endovascular aneurysm repair in 2009-stable  CKD  Baseline creatinine is 1.5-2, admission creatinine was 1 2, on discharge his creatinine went up to 1.54 after angiogram, need further follow-up, BMP ordered on discharge.    Rest of the medical problems including chronic pain syndrome, postpolio syndrome, multiple orthopedic surgeries in the lower extremities, depression, history of sleep apnea without any ongoing CPAP use chirag Flores MD    Significant Results and Procedures       Pending Results     Unresulted Labs Ordered in the Past 30 Days of this Admission     No orders found from 11/5/2019 to 12/6/2019.          Code Status   Full Code       Primary Care Physician   Higinio Green    Physical Exam   Temp: 98.4  F (36.9  C) Temp src: Oral BP: 112/78 Pulse: 86 Heart Rate: 68 Resp: 16 SpO2: 98 % O2 Device: None (Room air)     Vitals:    12/05/19 0807 12/07/19 0625   Weight: 113.4 kg (250 lb) 112.9 kg (249 lb)     Vital Signs with Ranges  Temp:  [97.5  F (36.4  C)-98.4  F (36.9  C)] 98.4  F (36.9  C)  Pulse:  [86] 86  Heart Rate:  [66-82] 68  Resp:  [16] 16  BP: (112-135)/(66-83) 112/78  SpO2:  [95 %-99 %] 98 %  I/O last 3 completed shifts:  In: 920 [P.O.:920]  Out: -     The patient was examined on the day of discharge.    Discharge Disposition   Discharged to home  Condition at discharge: Stable    Consultations This Hospital Stay   NUTRITION SERVICES ADULT IP CONSULT  CARDIAC REHAB IP CONSULT  PHARMACY IP CONSULT  PHARMACY IP CONSULT  HOSPITALIST IP CONSULT  CARDIOLOGY IP CONSULT  PHARMACY LIAISON FOR MEDICATION COVERAGE CONSULT  SMOKING CESSATION PROGRAM IP CONSULT    Time Spent on this Encounter   I, Kiesha Flores MD, personally saw the patient today and spent greater than 30 minutes discharging this patient.    Discharge Orders      Basic metabolic panel     CBC with platelets    Last Lab Result: Hemoglobin (g/dL)       Date                     Value                 12/06/2019               15.8             ----------     CARDIAC REHAB REFERRAL      Discharge Order: F/U with Cardiac  LESLI      Reason for your hospital stay    St elevation MI     Follow-up and recommended labs and tests     Follow up with primary care provider, Higinio Green, within 7 days for hospital follow- up.  The following labs/tests are recommended: basic metabolic panel in 5-6 days.  Follow up with cardiology as scheduled .     Activity    Your activity upon discharge: activity as tolerated as recommended by cardiology .     Full Code     Diet    Follow this diet upon discharge: Orders Placed This Encounter      Combination Diet 1309-3287 Calories: High Consistent CHO (4-7 CHO units/meal); Low Saturated Fat Na <2400mg Diet     Case Request Cath Lab: Heart Catheterization with Possible Intervention     Discharge Medications   Current Discharge Medication  List      START taking these medications    Details   atorvastatin (LIPITOR) 80 MG tablet Take 1 tablet (80 mg) by mouth daily  Qty: 30 tablet, Refills: 1    Associated Diagnoses: ST elevation myocardial infarction involving right coronary artery (H)      metoprolol succinate ER (TOPROL-XL) 50 MG 24 hr tablet Take 1 tablet (50 mg) by mouth daily  Qty: 30 tablet, Refills: 0    Associated Diagnoses: ST elevation myocardial infarction involving right coronary artery (H)      ticagrelor (BRILINTA) 90 MG tablet Take 1 tablet (90 mg) by mouth every 12 hours  Qty: 60 tablet, Refills: 1    Associated Diagnoses: ST elevation myocardial infarction involving right coronary artery (H)         CONTINUE these medications which have CHANGED    Details   aspirin (ASA) 81 MG EC tablet Take 1 tablet (81 mg) by mouth daily  Qty: 30 tablet, Refills: 1    Associated Diagnoses: ST elevation myocardial infarction involving right coronary artery (H)      lisinopril (PRINIVIL/ZESTRIL) 40 MG tablet Take 1 tablet (40 mg) by mouth daily  Qty: 30 tablet, Refills: 1    Associated Diagnoses: ST elevation myocardial infarction involving right coronary artery (H)         CONTINUE these medications which have NOT CHANGED    Details   BuPROPion HCl (WELLBUTRIN XL PO) Take 150 mg by mouth daily      insulin aspart (NOVOLOG FLEXPEN) 100 UNIT/ML injection Inject 20 Units Subcutaneous 3 times daily (with meals) Base- 20 Units per meal  Add 3 units for every 50 over 150 Blood Glucose reading  If Blood Glucose is :  151-199 = 20 units + 3 = 23 Units  200-249 = 20 Units + 6 = 26 Units  250-299 = 20 units + 9 = 29 Units      insulin detemir (LEVEMIR VIAL) 100 UNIT/ML vial Inject 40 Units Subcutaneous every morning (before breakfast)      sildenafil (VIAGRA) 100 MG tablet Take 100 mg by mouth daily as needed      testosterone cypionate (DEPOTESTOSTERONE) 100 MG/ML injection Inject 150 mg into the muscle every 14 days      zolpidem (AMBIEN) 10 MG tablet Take  10 mg by mouth nightly as needed for sleep      order for DME Equipment being ordered: Walker Wheels () and Walker ()  Treatment Diagnosis: impaired gait  Qty: 1 each, Refills: 0    Associated Diagnoses: S/P total knee replacement using cement, right         STOP taking these medications       simvastatin (ZOCOR) 20 MG tablet Comments:   Reason for Stopping:             Allergies   Allergies   Allergen Reactions     Levaquin Other (See Comments)     Swelling in knees and pain in chest, muscle aches.  Was hospitalized at [facility] 12/09.     Data   Most Recent 3 CBC's:  Recent Labs   Lab Test 12/07/19  0536 12/06/19  0515 12/05/19  0820   WBC 11.5* 11.9* 10.9   HGB 16.0 15.8 16.3   MCV 98 97 97    169 181      Most Recent 3 BMP's:  Recent Labs   Lab Test 12/07/19  0536 12/06/19  0515 12/05/19  0820    134 132*   POTASSIUM 4.8 4.5 4.4   CHLORIDE 104 106 102   CO2 26 23 24   BUN 32* 27 32*   CR 1.54* 1.22 1.61*   ANIONGAP 4 5 6   REZA 9.0 9.1 9.3   * 245* 396*     Most Recent 2 LFT's:  Recent Labs   Lab Test 12/05/19  0820   AST 21   ALT 45   ALKPHOS 117   BILITOTAL 0.4     Most Recent INR's and Anticoagulation Dosing History:  Anticoagulation Dose History     Recent Dosing and Labs Latest Ref Rng & Units 11/24/2006 12/5/2019    INR 0.86 - 1.14 0.86 1.03        Most Recent 3 Troponin's:  Recent Labs   Lab Test 12/05/19  2146 12/05/19  1337 12/05/19  1023   TROPI 30.720* 46.334* 9.739*     Most Recent Cholesterol Panel:  Recent Labs   Lab Test 12/05/19  0820   CHOL 146   LDL 54   HDL 34*   TRIG 289*     Most Recent 6 Bacteria Isolates From Any Culture (See EPIC Reports for Culture Details):  Recent Labs   Lab Test 09/24/11  1523 09/23/11  2315 09/23/11  2310   CULT No growth No growth after 6 days No growth after 6 days     Most Recent TSH, T4 and A1c Labs:  Recent Labs   Lab Test 12/05/19  0820   A1C 11.3*     Results for orders placed or performed during the hospital encounter of 12/05/19   XR  Chest Port 1 View    Narrative    CHEST PORTABLE ONE VIEW December 5, 2019 8:45 AM     HISTORY: Chest pain.    COMPARISON: Chest CT 12/13/2009.        Impression    IMPRESSION: Cardiac silhouette appears to be within normal limits. No  focal airspace opacities identified although the lung bases are not  visualized. No pneumothorax identified on this portable view.     BENNIE JORDAN MD   Cardiac Catheterization    Narrative      Mid RCA lesion is 100% stenosed.    Prox RCA lesion is 30% stenosed.    Dist RCA lesion is 40% stenosed.    Ost LAD to Prox LAD lesion is 20% stenosed.    Mid LAD lesion is 20% stenosed.    2nd Mrg lesion is 80% stenosed.    IVUS was performed on the lesion.    Pressure wire/FFR was not performed on the lesion.    Ultrasound (IVUS) was performed.    Recommend staged intervention to the LCX

## 2019-12-07 NOTE — PROVIDER NOTIFICATION
MD Notification    Notified Person: MD    Notified Person Name: Dr. Flores     Notification Date/Time: 12/7 @ 2:25 PM     Notification Interaction: Text-paged     Purpose of Notification: Patient recheck blood sugar after lunch with sliding scale was 363, please advise.     Orders Received: One time dose of 10 units of novolog, recheck blood sugar in 30 minutes. If blood sugar < 200, patient okay to discharge      Comments: Further educating patient on importance of taking blood sugar prior to every meal to properly use sliding scale insulin at home. Hospitalist aware cardiology has signed off.

## 2019-12-07 NOTE — PLAN OF CARE
Discharge Planner PT   Patient plan for discharge: Home  Current status: Pt amb 7 minutes in halls and negotiated 10 stairs for a total of 9 minutes of continuous activity. Pt vital signs stable, see vital signs flow sheet. Pt rates activity as 7/10 on OMNI effort scale.  Barriers to return to prior living situation: None from a mobility standpoint  Recommendations for discharge: Home with outpatient cardiac rehab  Rationale for recommendations: Pt is able to complete mobility necessary for discharge to home, recommend outpatient cardiac  rehab to further progress aerobic activity tolerance and for education to promote optimal heart health.       Entered by: Pita Peterson 12/07/2019 10:03 AM      Cardiac Rehab Discharge Summary    Reason for therapy discharge:    Discharged to home with outpatient therapy.    Progress towards therapy goal(s). See goals on Care Plan in [facility] electronic health record for goal details.  Goals partially met.  Barriers to achieving goals:   discharge from facility.    Therapy recommendation(s):    Continued therapy is recommended.  Rationale/Recommendations:  outpatient cardiac rehab to further progress aerobic activity tolerance in monitored setting and for education to promote optimal heart health.

## 2019-12-08 NOTE — PLAN OF CARE
A&O, VSS, room air. Tele: SR, HR 60-70's. Denies CP and SOB. Bilateral groin sites C/D/I, CMS intact, good pulses except dorsalis pulse on R foot d/t hx of surgery. BS elevated, MD aware w/ additional dose of 10 units of novolog given and blood sugar recheck done, improved. Patient education done on checking blood sugars prior to meals and keeping daily record for PCP. Cardiology has signed off. Patient not being sent home with SL nitroglycerin d/t PTA sildenafil per cardiology. Education done on groin site precautions, all questions have been answered at this time. Medications sent home with patient from discharge pharmacy. All belongings have been sent home with patient. Patient has been given WC ride off unit and wife here to drive patient home.

## 2019-12-09 ENCOUNTER — TELEPHONE (OUTPATIENT)
Dept: CARDIOLOGY | Facility: CLINIC | Age: 69
End: 2019-12-09

## 2019-12-09 DIAGNOSIS — I25.10 CAD (CORONARY ARTERY DISEASE): Primary | ICD-10-CM

## 2019-12-09 RX ORDER — NITROGLYCERIN 0.4 MG/1
TABLET SUBLINGUAL
Qty: 30 TABLET | Refills: 1 | Status: SHIPPED | OUTPATIENT
Start: 2019-12-09

## 2019-12-09 NOTE — TELEPHONE ENCOUNTER
Patient was evaluated by cardiology while inpatient for chest pain and inferior STEMI. 12/5/19 PCI via RFA with SHIVA to mRCA with residual 80% stenosis to OM. Plan for staged procedure scheduled on 12/16/19.RN confirmed with patient that he was d/c with the antiplatelet Brilinta. Pt does not have an Rx for PRN SL Nitroglycerin. Will route this note to LES[facility] Luna for clarification. Pt does have an Rx for Viagra.  RN will confirm with patient that he has an apt scheduled on 12/13/19 with [facility] Suzanne Reyes with labs prior. Cardiac rehab is scheduled on 12/17/19. JUAN JOSÉ Bedolla RN

## 2019-12-09 NOTE — TELEPHONE ENCOUNTER
Writer returned pt's phone message. Pt denies any chest pain, SOB or lightheadedness. RFA access site is healing without signs of infection, swelling or drainage. States has adequate supply of Brilinta and denies any medication questions. Writer instructed pt on PRN SL Nitroglycerin, including indications, storage and expiration period once bottle opened, administration, expected side effects and when to call the clinic vs 911. Pt iinstructed to not take Viagra within 24-48 hrs of NTG use. Pt verbalized understanding and RX escribed to pt's [facility] pharmacy per his request. F/U OV as below and cardiac rehab reviewed and pt verbalized understanding of all instructions without further questions. JUAN JOSÉ Bedolla RN.

## 2019-12-09 NOTE — TELEPHONE ENCOUNTER
Should have SL NTG. Please remind patient that he cannot take this and viagra within 24h of one another. Thanks!

## 2019-12-11 LAB — INTERPRETATION ECG - MUSE: NORMAL

## 2019-12-13 ENCOUNTER — OFFICE VISIT (OUTPATIENT)
Dept: CARDIOLOGY | Facility: CLINIC | Age: 69
End: 2019-12-13
Attending: PHYSICIAN ASSISTANT
Payer: MEDICARE

## 2019-12-13 VITALS
DIASTOLIC BLOOD PRESSURE: 71 MMHG | SYSTOLIC BLOOD PRESSURE: 110 MMHG | HEIGHT: 72 IN | WEIGHT: 254.5 LBS | BODY MASS INDEX: 34.47 KG/M2 | HEART RATE: 69 BPM

## 2019-12-13 DIAGNOSIS — I25.10 CORONARY ARTERY DISEASE INVOLVING NATIVE CORONARY ARTERY OF NATIVE HEART WITHOUT ANGINA PECTORIS: ICD-10-CM

## 2019-12-13 LAB
ANION GAP SERPL CALCULATED.3IONS-SCNC: 13.9 MMOL/L (ref 6–17)
BUN SERPL-MCNC: 34 MG/DL (ref 7–30)
CALCIUM SERPL-MCNC: 9.9 MG/DL (ref 8.5–10.5)
CHLORIDE SERPL-SCNC: 103 MMOL/L (ref 98–107)
CO2 SERPL-SCNC: 22 MMOL/L (ref 23–29)
CREAT SERPL-MCNC: 1.81 MG/DL (ref 0.7–1.3)
ERYTHROCYTE [DISTWIDTH] IN BLOOD BY AUTOMATED COUNT: 13.5 % (ref 10–15)
GFR SERPL CREATININE-BSD FRML MDRD: 37 ML/MIN/{1.73_M2}
GLUCOSE SERPL-MCNC: 235 MG/DL (ref 70–105)
HCT VFR BLD AUTO: 47.1 % (ref 40–53)
HGB BLD-MCNC: 15.2 G/DL (ref 13.3–17.7)
MCH RBC QN AUTO: 31.8 PG (ref 26.5–33)
MCHC RBC AUTO-ENTMCNC: 32.3 G/DL (ref 31.5–36.5)
MCV RBC AUTO: 99 FL (ref 78–100)
PLATELET # BLD AUTO: 193 10E9/L (ref 150–450)
POTASSIUM SERPL-SCNC: 4.9 MMOL/L (ref 3.5–5.1)
RBC # BLD AUTO: 4.78 10E12/L (ref 4.4–5.9)
SODIUM SERPL-SCNC: 134 MMOL/L (ref 136–145)
WBC # BLD AUTO: 10.4 10E9/L (ref 4–11)

## 2019-12-13 PROCEDURE — 36415 COLL VENOUS BLD VENIPUNCTURE: CPT | Performed by: PHYSICIAN ASSISTANT

## 2019-12-13 PROCEDURE — 99214 OFFICE O/P EST MOD 30 MIN: CPT | Performed by: NURSE PRACTITIONER

## 2019-12-13 PROCEDURE — 85027 COMPLETE CBC AUTOMATED: CPT | Performed by: PHYSICIAN ASSISTANT

## 2019-12-13 PROCEDURE — 80048 BASIC METABOLIC PNL TOTAL CA: CPT | Performed by: PHYSICIAN ASSISTANT

## 2019-12-13 ASSESSMENT — MIFFLIN-ST. JEOR: SCORE: 1952.4

## 2019-12-13 NOTE — PATIENT INSTRUCTIONS
Drink fluids.    Hold lisinopril    Check labs next Thursday.    If those look good, then cath the next day.    [facility]  [phone]

## 2019-12-13 NOTE — PROGRESS NOTES
HPI and Plan:   I had the pleasure of seeing Huey Lucas and his wife today in cardiology clinic follow up. He is a pleasant 69 year old patient of Dr. Farmer.    Mr. Lucas was admitted December 5 for ST elevation MI.  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  He has CKD with a baseline of 1.5-2.  He has type 2 insulin-dependent diabetes, a history of hepatitis C status post Harvoni treatment, hypertension, hyperlipidemia and untreated sleep apnea.  He has a history of smoking.    He was admitted with acute onset chest pain.  His troponins peaked at 46.  He was taken to the Cath Lab where he was found to have 100% occluded mid RCA lesion which was subsequently stented with a  3.5 x 30 mm drug-eluting stent.  He had mild disease in his LAD, he had a 80% second marginal lesion and was recommended to return for staged left circumflex intervention.  His echocardiogram showed mild inferior wall hypokinesis and EF of 50 to 55%.  He had aortic valve sclerosis noted.    He was discharged on Brilinta and aspirin.  He was started on Toprol-XL 50 mg daily.  His lisinopril was increased from 20 to 40 mg.  He was given nitro and told not to use it within 48 hours of his Viagra, which I reiterated again today.    Today he is doing okay, he still has some shortness of breath.  He has not started cardiac rehab.  He has ecchymosis in the area of the groin but otherwise is doing well.  He is set up for a staged procedure next Friday.  His labs were checked today and his creatinine has increased to 1.8 (from 1.2).      Physical Exam  Please see Below     Assessment and Plan  1.  Recent inferior STEMI.  He had RCA stenting as it is scheduled for a staged intervention to his left circumflex next Friday.  I am little concerned about his renal function.  I have asked him to stop his lisinopril.  Drink some extra fluids, and will check  BMP next Thursday, the day before his cath.  He continues on good medical management with statin therapy, dual antiplatelet therapy and beta blockade.    I did review his previous coronary angiogram with him.  We discussed the plan for staged intervention.  He would like to proceed.  The procedure, risks and benefits of coronary angiography were discussed with the patient in detail. The risks discussed include risk of heart attack, stroke, arrhythmia, bleeding, vascular trauma/complications, possible urgent bypass surgery, and death. We discussed that contrast is used during the procedure which can potentially damage the kidney. We discussed other diagnostics including possible FFR. The procedure is low risk. The patient may go home the same day and will need a .  Finally we discussed that stents may be used requiring dual antiplatelet therapy for possible a year or more, which increases the risk for bleeding. Patient would like to proceed. Consent will be obtained in the cath lab. This patient is medically optimized, sis  blood pressure is controlled, he is on statin and dual antiplatelet therapy.    2.  Hyperlipidemia.  His LDL looked okay when it was initially checked, but his triglycerides were 289 which leads me to suspect its falsely low.  He was started on Lipitor 80, down the road  we should relook at his lipids.    3. Hypertension.  Controlled.  I am going to stop his lisinopril because of his renal insufficiency.  He is scheduled for cardiac rehab next week, if his blood pressure is above 140 then I would add 2.5 of amlodipine, otherwise we will see how he does off of the ACE inhibitor.      Thank you for allowing me to care for Huey Lucas today.  He has follow-up arranged with him after his staged intervention.    OFELIA Brown, CNP  Cardiology    Voice recognition software was used for this note, I have reviewed this note, but errors may have been missed.    Orders Placed This  Encounter   Procedures     Basic metabolic panel     No orders of the defined types were placed in this encounter.    Medications Discontinued During This Encounter   Medication Reason     lisinopril (PRINIVIL/ZESTRIL) 40 MG tablet          CURRENT MEDICATIONS:  Current Outpatient Medications   Medication Sig Dispense Refill     aspirin (ASA) 81 MG EC tablet Take 1 tablet (81 mg) by mouth daily 30 tablet 1     atorvastatin (LIPITOR) 80 MG tablet Take 1 tablet (80 mg) by mouth daily 30 tablet 1     BuPROPion HCl (WELLBUTRIN XL PO) Take 150 mg by mouth daily       insulin aspart (NOVOLOG FLEXPEN) 100 UNIT/ML injection Inject 20 Units Subcutaneous 3 times daily (with meals) Base- 20 Units per meal  Add 3 units for every 50 over 150 Blood Glucose reading  If Blood Glucose is :  151-199 = 20 units + 3 = 23 Units  200-249 = 20 Units + 6 = 26 Units  250-299 = 20 units + 9 = 29 Units       insulin detemir (LEVEMIR VIAL) 100 UNIT/ML vial Inject 40 Units Subcutaneous every morning (before breakfast)       metoprolol succinate ER (TOPROL-XL) 50 MG 24 hr tablet Take 1 tablet (50 mg) by mouth daily 30 tablet 0     nitroGLYcerin (NITROSTAT) 0.4 MG sublingual tablet For chest pain place 1 tablet under the tongue every 5 minutes for 3 doses. If symptoms persist 5 minutes after 2 nd dose call 911. 30 tablet 1     sildenafil (VIAGRA) 100 MG tablet Take 100 mg by mouth daily as needed       testosterone cypionate (DEPOTESTOSTERONE) 100 MG/ML injection Inject 150 mg into the muscle every 14 days       ticagrelor (BRILINTA) 90 MG tablet Take 1 tablet (90 mg) by mouth every 12 hours 60 tablet 1     zolpidem (AMBIEN) 10 MG tablet Take 10 mg by mouth nightly as needed for sleep       order for DME Equipment being ordered: Walker Wheels () and Walker ()  Treatment Diagnosis: impaired gait 1 each 0       ALLERGIES     Allergies   Allergen Reactions     Levaquin Other (See Comments)     Swelling in knees and pain in chest, muscle  aches.  Was hospitalized at [facility] 12/09.       PAST MEDICAL HISTORY:  Past Medical History:   Diagnosis Date     AAA (abdominal aortic aneurysm) (H)     surgery 2011, s/p EVAR 12-30-09     Arthritis     osteoarthritis     Baker's cyst of knee      BPH (benign prostatic hyperplasia)      Chronic anemia      Chronic pain syndrome      Chronic renal insufficiency      CKD (chronic kidney disease)      Complex regional pain syndrome      Cyst of pituitary gland (H)     surgery 2011     Depression      Diabetes mellitus (H)     type II, on oral medications     Erectile dysfunction      Hepatitis C, chronic (H)     history of; resolved per pt (12/07/15)     Hypertension      Hypogonadism male      Late effects of acute poliomyelitis      Polio      Polyarthritis     inflammatory     Pulmonary hypertension (H)     H/O     Pulmonary hypertension (H)      Pulmonary hypertension (H)      Pure hypercholesterolemia      Renal stone      Right ankle pain      S/P insertion of spinal cord stimulator      Sleep apnea     C PAP not using since 4/2015       PAST SURGICAL HISTORY:  Past Surgical History:   Procedure Laterality Date     ABDOMEN SURGERY      AAA repair 2011     ARTHRODESIS ANKLE Left 4/13/2015    Procedure: ARTHRODESIS ANKLE;  Surgeon: Kim Martinez MD;  [facility]     ARTHRODESIS ANKLE Left 3/4/2019    Procedure: LEFT REVISION ARTHRODESIS ANKLE  AND BUNIONECTOMY ;  Surgeon: Kim Martinez MD;  Location:  OR     ARTHRODESIS FOOT  8/18/2014    Procedure: ARTHRODESIS FOOT;  Surgeon: Kim Martinez MD;  [facility]     ARTHROPLASTY KNEE  5/1/2012    Procedure:ARTHROPLASTY KNEE; LEFT TOTAL KNEE ARTHROPLASTY (DEPUY)^; Surgeon:JOSIAS QUINTANA; Location:[facility]     ARTHROPLASTY KNEE Right 1/30/2017    Procedure: ARTHROPLASTY KNEE;  Surgeon: Josias Quintana MD;  Location:  OR     ARTHROSCOPY SHOULDER ROTATOR CUFF REPAIR  7/17/2013    Procedure: ARTHROSCOPY SHOULDER ROTATOR CUFF REPAIR;  RIGHT  SHOULDER ARTHROSCOPIC ROTATOR CUFF REPAIR, SUBACROMIAL DECOMPRESSION WITH ACROMIOPLASTY, BICEP TENODESIS. ;  Surgeon: Josias Quintana MD;  Location: [facility]     BACK SURGERY  6/2011    spinal cord stimulator removed     BUNIONECTOMY Right 12/7/2015    Procedure: BUNIONECTOMY;  Surgeon: Kim Martinez MD;  Location: [facility]     BUNIONECTOMY Left 3/4/2019    Procedure: Bunionectomy;  Surgeon: Kim Martinez MD;  Location:  OR     COLONOSCOPY       CV HEART CATHETERIZATION WITH POSSIBLE INTERVENTION N/A 12/5/2019    Procedure: Heart Catheterization;  Surgeon: Howard Santana MD;  Location:  HEART CARDIAC CATH LAB     ENT SURGERY  7 years old    tonsillectomy     ESOPHAGOGASTRODUODENOSCOPY       EXCISE CYST GENERIC (LOCATION) Right 3/7/2016    Procedure: EXCISE CYST GENERIC (LOCATION);  Surgeon: Kim Martinez MD;  Location: [facility]     GI SURGERY  in high school    drain pilonidal cyst     GRAFT BONE FROM ILIAC CREST Right 3/7/2016    Procedure: GRAFT BONE FROM ILIAC CREST;  Surgeon: Kim Martinez MD;  Location: [facility]     HEAD & NECK SURGERY  2/6/15    excision of cyst back of neck     IRRIGATION AND DEBRIDEMENT LOWER EXTREMITY, COMBINED Right 10/9/2017    Procedure: OPEN RIGHT ANKLE MEDIAL GUTTER DEBRIDEMENT ;  Surgeon: Kim Martinez MD;  Location: [facility]     ORTHOPEDIC SURGERY      right ankle replacement     ORTHOPEDIC SURGERY  5/01    arthroscopy right knee     ORTHOPEDIC SURGERY  7/01    right total ankle with subtalar fusion     ORTHOPEDIC SURGERY  3/05, 9/10    right ankle revision x 2     ORTHOPEDIC SURGERY  8/2014    L naviculocuneiform fusion, gastrocnemius lengthening and 2nd hammer toe repair     PITUITARY SURGERY  7/2011    transsphenoidal fenestration of pituitary cyst     REMOVE HARDWARE FOOT Left 4/13/2015    Procedure: REMOVE HARDWARE FOOT;  Surgeon: Kim Martinez MD;  Location: [facility]       FAMILY HISTORY:  Family History   Problem Relation Age of Onset      Lymphoma Mother      Abdominal Aortic Aneurysm Father          of AAA     Alcoholism Paternal Grandfather        SOCIAL HISTORY:  Social History     Socioeconomic History     Marital status:      Spouse name: None     Number of children: None     Years of education: None     Highest education level: None   Occupational History     None   Social Needs     Financial resource strain: None     Food insecurity:     Worry: None     Inability: None     Transportation needs:     Medical: None     Non-medical: None   Tobacco Use     Smoking status: Former Smoker     Packs/day: 1.50     Years: 20.00     Pack years: 30.00     Types: Cigarettes, Cigars     Last attempt to quit: 2000     Years since quittin.9     Smokeless tobacco: Never Used     Tobacco comment: quit    Substance and Sexual Activity     Alcohol use: No     Drug use: Yes     Frequency: 1.0 times per week     Types: Marijuana     Sexual activity: None   Lifestyle     Physical activity:     Days per week: None     Minutes per session: None     Stress: None   Relationships     Social connections:     Talks on phone: None     Gets together: None     Attends Alevism service: None     Active member of club or organization: None     Attends meetings of clubs or organizations: None     Relationship status: None     Intimate partner violence:     Fear of current or ex partner: None     Emotionally abused: None     Physically abused: None     Forced sexual activity: None   Other Topics Concern     Parent/sibling w/ CABG, MI or angioplasty before 65F 55M? Not Asked   Social History Narrative     None       Review of Systems:  Skin:  Negative       Eyes:  Positive for glasses    ENT:  Negative      Respiratory:  Positive for shortness of breath     Cardiovascular:    Positive for;dizziness;lightheadedness occ.  Gastroenterology: Negative      Genitourinary:  Negative      Musculoskeletal:  Negative      Neurologic:  Positive for       Psychiatric:  Negative      Heme/Lymph/Imm:  Positive for allergies    Endocrine:  Positive for diabetes      Physical Exam:  Vitals: /71   Pulse 69   Ht 1.829 m (6')   Wt 115.4 kg (254 lb 8 oz)   BMI 34.52 kg/m      Constitutional:  cooperative obese      Skin:  warm and dry to the touch          Head:  normocephalic        Eyes:  pupils equal and round        Lymph:      ENT:  no pallor or cyanosis        Neck:  JVP normal        Respiratory:  clear to auscultation         Cardiac: regular rhythm;normal S1 and S2                                                         GI:    obese      Extremities and Muscular Skeletal:  no edema              Neurological:  affect appropriate        Psych:  Alert and Oriented x 3    Encounter Diagnosis   Name Primary?     Coronary artery disease involving native coronary artery of native heart without angina pectoris        Recent Lab Results:  LIPID RESULTS:  Lab Results   Component Value Date    CHOL 146 12/05/2019    HDL 34 (L) 12/05/2019    LDL 54 12/05/2019    TRIG 289 (H) 12/05/2019       LIVER ENZYME RESULTS:  Lab Results   Component Value Date    AST 21 12/05/2019    ALT 45 12/05/2019       CBC RESULTS:  Lab Results   Component Value Date    WBC 10.4 12/13/2019    RBC 4.78 12/13/2019    HGB 15.2 12/13/2019    HCT 47.1 12/13/2019    MCV 99 12/13/2019    MCH 31.8 12/13/2019    MCHC 32.3 12/13/2019    RDW 13.5 12/13/2019     12/13/2019       BMP RESULTS:  Lab Results   Component Value Date     (L) 12/13/2019    POTASSIUM 4.9 12/13/2019    CHLORIDE 103 12/13/2019    CO2 22 (L) 12/13/2019    ANIONGAP 13.9 12/13/2019     (H) 12/13/2019    BUN 34 (H) 12/13/2019    CR 1.81 (H) 12/13/2019    GFRESTIMATED 37 (L) 12/13/2019    GFRESTBLACK 45 (L) 12/13/2019    REZA 9.9 12/13/2019        A1C RESULTS:  Lab Results   Component Value Date    A1C 11.3 (H) 12/05/2019       INR RESULTS:  Lab Results   Component Value Date    INR 1.03 12/05/2019    INR 0.86  11/24/2006           CC  Terese Antonio PA-C  [id number]  [place] [id number]

## 2019-12-13 NOTE — LETTER
12/13/2019    Higinio Green  [facility] [address]  [facility] [address]    RE: Huey Starkcara       Dear Colleague,    I had the pleasure of seeing Huey Lucas in the [facility].    HPI and Plan:   I had the pleasure of seeing Huey Lucas and his wife today in cardiology clinic follow up. He is a pleasant 69 year old patient of Dr. Farmer.    Mr. Lucas was admitted December 5 for ST elevation MI.  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  He has CKD with a baseline of 1.5-2.  He has type 2 insulin-dependent diabetes, a history of hepatitis C status post Harvoni treatment, hypertension, hyperlipidemia and untreated sleep apnea.  He has a history of smoking.    He was admitted with acute onset chest pain.  His troponins peaked at 46.  He was taken to the Cath Lab where he was found to have 100% occluded mid RCA lesion which was subsequently stented with a  3.5 x 30 mm drug-eluting stent.  He had mild disease in his LAD, he had a 80% second marginal lesion and was recommended to return for staged left circumflex intervention.  His echocardiogram showed mild inferior wall hypokinesis and EF of 50 to 55%.  He had aortic valve sclerosis noted.    He was discharged on Brilinta and aspirin.  He was started on Toprol-XL 50 mg daily.  His lisinopril was increased from 20 to 40 mg.  He was given nitro and told not to use it within 48 hours of his Viagra, which I reiterated again today.    Today he is doing okay, he still has some shortness of breath.  He has not started cardiac rehab.  He has ecchymosis in the area of the groin but otherwise is doing well.  He is set up for a staged procedure next Friday.  His labs were checked today and his creatinine has increased to 1.8 (from 1.2).      Physical Exam  Please see Below     Assessment and Plan  1.  Recent  inferior STEMI.  He had RCA stenting as it is scheduled for a staged intervention to his left circumflex next Friday.  I am little concerned about his renal function.  I have asked him to stop his lisinopril.  Drink some extra fluids, and will check BMP next Thursday, the day before his cath.  He continues on good medical management with statin therapy, dual antiplatelet therapy and beta blockade.    I did review his previous coronary angiogram with him.  We discussed the plan for staged intervention.  He would like to proceed.  The procedure, risks and benefits of coronary angiography were discussed with the patient in detail. The risks discussed include risk of heart attack, stroke, arrhythmia, bleeding, vascular trauma/complications, possible urgent bypass surgery, and death. We discussed that contrast is used during the procedure which can potentially damage the kidney. We discussed other diagnostics including possible FFR. The procedure is low risk. The patient may go home the same day and will need a .  Finally we discussed that stents may be used requiring dual antiplatelet therapy for possible a year or more, which increases the risk for bleeding. Patient would like to proceed. Consent will be obtained in the cath lab. This patient is medically optimized, sis  blood pressure is controlled, he is on statin and dual antiplatelet therapy.    2.  Hyperlipidemia.  His LDL looked okay when it was initially checked, but his triglycerides were 289 which leads me to suspect its falsely low.  He was started on Lipitor 80, down the road  we should relook at his lipids.    3. Hypertension.  Controlled.  I am going to stop his lisinopril because of his renal insufficiency.  He is scheduled for cardiac rehab next week, if his blood pressure is above 140 then I would add 2.5 of amlodipine, otherwise we will see how he does off of the ACE inhibitor.      Thank you for allowing me to care for Huey Calvo Shayy today.   He has follow-up arranged with him after his staged intervention.    OFELIA Brown, CNP  Cardiology    Voice recognition software was used for this note, I have reviewed this note, but errors may have been missed.    Orders Placed This Encounter   Procedures     Basic metabolic panel     No orders of the defined types were placed in this encounter.    Medications Discontinued During This Encounter   Medication Reason     lisinopril (PRINIVIL/ZESTRIL) 40 MG tablet          CURRENT MEDICATIONS:  Current Outpatient Medications   Medication Sig Dispense Refill     aspirin (ASA) 81 MG EC tablet Take 1 tablet (81 mg) by mouth daily 30 tablet 1     atorvastatin (LIPITOR) 80 MG tablet Take 1 tablet (80 mg) by mouth daily 30 tablet 1     BuPROPion HCl (WELLBUTRIN XL PO) Take 150 mg by mouth daily       insulin aspart (NOVOLOG FLEXPEN) 100 UNIT/ML injection Inject 20 Units Subcutaneous 3 times daily (with meals) Base- 20 Units per meal  Add 3 units for every 50 over 150 Blood Glucose reading  If Blood Glucose is :  151-199 = 20 units + 3 = 23 Units  200-249 = 20 Units + 6 = 26 Units  250-299 = 20 units + 9 = 29 Units       insulin detemir (LEVEMIR VIAL) 100 UNIT/ML vial Inject 40 Units Subcutaneous every morning (before breakfast)       metoprolol succinate ER (TOPROL-XL) 50 MG 24 hr tablet Take 1 tablet (50 mg) by mouth daily 30 tablet 0     nitroGLYcerin (NITROSTAT) 0.4 MG sublingual tablet For chest pain place 1 tablet under the tongue every 5 minutes for 3 doses. If symptoms persist 5 minutes after 2 nd dose call 911. 30 tablet 1     sildenafil (VIAGRA) 100 MG tablet Take 100 mg by mouth daily as needed       testosterone cypionate (DEPOTESTOSTERONE) 100 MG/ML injection Inject 150 mg into the muscle every 14 days       ticagrelor (BRILINTA) 90 MG tablet Take 1 tablet (90 mg) by mouth every 12 hours 60 tablet 1     zolpidem (AMBIEN) 10 MG tablet Take 10 mg by mouth nightly as needed for sleep       order for DME  Equipment being ordered: Walker Wheels () and Walker ()  Treatment Diagnosis: impaired gait 1 each 0       ALLERGIES     Allergies   Allergen Reactions     Levaquin Other (See Comments)     Swelling in knees and pain in chest, muscle aches.  Was hospitalized at [facility] 12/09.       PAST MEDICAL HISTORY:  Past Medical History:   Diagnosis Date     AAA (abdominal aortic aneurysm) (H)     surgery 2011, s/p EVAR 12-30-09     Arthritis     osteoarthritis     Baker's cyst of knee      BPH (benign prostatic hyperplasia)      Chronic anemia      Chronic pain syndrome      Chronic renal insufficiency      CKD (chronic kidney disease)      Complex regional pain syndrome      Cyst of pituitary gland (H)     surgery 2011     Depression      Diabetes mellitus (H)     type II, on oral medications     Erectile dysfunction      Hepatitis C, chronic (H)     history of; resolved per pt (12/07/15)     Hypertension      Hypogonadism male      Late effects of acute poliomyelitis      Polio      Polyarthritis     inflammatory     Pulmonary hypertension (H)     H/O     Pulmonary hypertension (H)      Pulmonary hypertension (H)      Pure hypercholesterolemia      Renal stone      Right ankle pain      S/P insertion of spinal cord stimulator      Sleep apnea     C PAP not using since 4/2015       PAST SURGICAL HISTORY:  Past Surgical History:   Procedure Laterality Date     ABDOMEN SURGERY      AAA repair 2011     ARTHRODESIS ANKLE Left 4/13/2015    Procedure: ARTHRODESIS ANKLE;  Surgeon: Kim Martinez MD;  Location:  [facility]     ARTHRODESIS ANKLE Left 3/4/2019    Procedure: LEFT REVISION ARTHRODESIS ANKLE  AND BUNIONECTOMY ;  Surgeon: Kim Martinez MD;  Location:  OR     ARTHRODESIS FOOT  8/18/2014    Procedure: ARTHRODESIS FOOT;  Surgeon: Kim Martinez MD;  Location: [facility]     ARTHROPLASTY KNEE  5/1/2012    Procedure:ARTHROPLASTY KNEE; LEFT TOTAL KNEE ARTHROPLASTY (DEPUY)^; Surgeon:DENISE BLOUNT; Location:  OR     ARTHROPLASTY KNEE Right 1/30/2017    Procedure: ARTHROPLASTY KNEE;  Surgeon: Josias Quintana MD;  Location:  OR     ARTHROSCOPY SHOULDER ROTATOR CUFF REPAIR  7/17/2013    Procedure: ARTHROSCOPY SHOULDER ROTATOR CUFF REPAIR;  RIGHT SHOULDER ARTHROSCOPIC ROTATOR CUFF REPAIR, SUBACROMIAL DECOMPRESSION WITH ACROMIOPLASTY, BICEP TENODESIS. ;  Surgeon: Josias Quintana MD;  Location: [facility]     BACK SURGERY  6/2011    spinal cord stimulator removed     BUNIONECTOMY Right 12/7/2015    Procedure: BUNIONECTOMY;  Surgeon: Kim Martinez MD;  Location: [facility]     BUNIONECTOMY Left 3/4/2019    Procedure: Bunionectomy;  Surgeon: Kim Martinez MD;  Location:  OR     COLONOSCOPY       CV HEART CATHETERIZATION WITH POSSIBLE INTERVENTION N/A 12/5/2019    Procedure: Heart Catheterization;  Surgeon: Howard Santana MD;  Location:  HEART CARDIAC CATH LAB     ENT SURGERY  7 years old    tonsillectomy     ESOPHAGOGASTRODUODENOSCOPY       EXCISE CYST GENERIC (LOCATION) Right 3/7/2016    Procedure: EXCISE CYST GENERIC (LOCATION);  Surgeon: Kim Martinez MD;  Location: [facility]     GI SURGERY  in high school    drain pilonidal cyst     GRAFT BONE FROM ILIAC CREST Right 3/7/2016    Procedure: GRAFT BONE FROM ILIAC CREST;  Surgeon: Kim Martinez MD;  Location: [facility]     HEAD & NECK SURGERY  2/6/15    excision of cyst back of neck     IRRIGATION AND DEBRIDEMENT LOWER EXTREMITY, COMBINED Right 10/9/2017    Procedure: OPEN RIGHT ANKLE MEDIAL GUTTER DEBRIDEMENT ;  Surgeon: Kim Martinez MD;  Location: [facility]     ORTHOPEDIC SURGERY      right ankle replacement     ORTHOPEDIC SURGERY  5/01    arthroscopy right knee     ORTHOPEDIC SURGERY  7/01    right total ankle with subtalar fusion     ORTHOPEDIC SURGERY  3/05, 9/10    right ankle revision x 2     ORTHOPEDIC SURGERY  8/2014    L naviculocuneiform fusion, gastrocnemius lengthening and 2nd hammer toe repair     PITUITARY SURGERY  7/2011     transsphenoidal fenestration of pituitary cyst     REMOVE HARDWARE FOOT Left 2015    Procedure: REMOVE HARDWARE FOOT;  Surgeon: Kim Martinez MD;  Location: [facility]       FAMILY HISTORY:  Family History   Problem Relation Age of Onset     Lymphoma Mother      Abdominal Aortic Aneurysm Father          of AAA     Alcoholism Paternal Grandfather        SOCIAL HISTORY:  Social History     Socioeconomic History     Marital status:      Spouse name: None     Number of children: None     Years of education: None     Highest education level: None   Occupational History     None   Social Needs     Financial resource strain: None     Food insecurity:     Worry: None     Inability: None     Transportation needs:     Medical: None     Non-medical: None   Tobacco Use     Smoking status: Former Smoker     Packs/day: 1.50     Years: 20.00     Pack years: 30.00     Types: Cigarettes, Cigars     Last attempt to quit: 2000     Years since quittin.9     Smokeless tobacco: Never Used     Tobacco comment: quit    Substance and Sexual Activity     Alcohol use: No     Drug use: Yes     Frequency: 1.0 times per week     Types: Marijuana     Sexual activity: None   Lifestyle     Physical activity:     Days per week: None     Minutes per session: None     Stress: None   Relationships     Social connections:     Talks on phone: None     Gets together: None     Attends Hinduism service: None     Active member of club or organization: None     Attends meetings of clubs or organizations: None     Relationship status: None     Intimate partner violence:     Fear of current or ex partner: None     Emotionally abused: None     Physically abused: None     Forced sexual activity: None   Other Topics Concern     Parent/sibling w/ CABG, MI or angioplasty before 65F 55M? Not Asked   Social History Narrative     None       Review of Systems:  Skin:  Negative       Eyes:  Positive for glasses    ENT:  Negative       Respiratory:  Positive for shortness of breath     Cardiovascular:    Positive for;dizziness;lightheadedness occ.  Gastroenterology: Negative      Genitourinary:  Negative      Musculoskeletal:  Negative      Neurologic:  Positive for      Psychiatric:  Negative      Heme/Lymph/Imm:  Positive for allergies    Endocrine:  Positive for diabetes      Physical Exam:  Vitals: /71   Pulse 69   Ht 1.829 m (6')   Wt 115.4 kg (254 lb 8 oz)   BMI 34.52 kg/m       Constitutional:  cooperative obese      Skin:  warm and dry to the touch          Head:  normocephalic        Eyes:  pupils equal and round        Lymph:      ENT:  no pallor or cyanosis        Neck:  JVP normal        Respiratory:  clear to auscultation         Cardiac: regular rhythm;normal S1 and S2                                                         GI:    obese      Extremities and Muscular Skeletal:  no edema              Neurological:  affect appropriate        Psych:  Alert and Oriented x 3    Encounter Diagnosis   Name Primary?     Coronary artery disease involving native coronary artery of native heart without angina pectoris        Recent Lab Results:  LIPID RESULTS:  Lab Results   Component Value Date    CHOL 146 12/05/2019    HDL 34 (L) 12/05/2019    LDL 54 12/05/2019    TRIG 289 (H) 12/05/2019       LIVER ENZYME RESULTS:  Lab Results   Component Value Date    AST 21 12/05/2019    ALT 45 12/05/2019       CBC RESULTS:  Lab Results   Component Value Date    WBC 10.4 12/13/2019    RBC 4.78 12/13/2019    HGB 15.2 12/13/2019    HCT 47.1 12/13/2019    MCV 99 12/13/2019    MCH 31.8 12/13/2019    MCHC 32.3 12/13/2019    RDW 13.5 12/13/2019     12/13/2019       BMP RESULTS:  Lab Results   Component Value Date     (L) 12/13/2019    POTASSIUM 4.9 12/13/2019    CHLORIDE 103 12/13/2019    CO2 22 (L) 12/13/2019    ANIONGAP 13.9 12/13/2019     (H) 12/13/2019    BUN 34 (H) 12/13/2019    CR 1.81 (H) 12/13/2019    GFRESTIMATED 37 (L)  12/13/2019    GFRESTBLACK 45 (L) 12/13/2019    REZA 9.9 12/13/2019        A1C RESULTS:  Lab Results   Component Value Date    A1C 11.3 (H) 12/05/2019       INR RESULTS:  Lab Results   Component Value Date    INR 1.03 12/05/2019    INR 0.86 11/24/2006         Thank you for allowing me to participate in the care of your patient.    Sincerely,     OFELIA [facility]

## 2019-12-13 NOTE — LETTER
12/13/2019    Higinio Green  [facility] [address]  [facility] [address]    RE: Huey Starkcara       Dear Colleague,    I had the pleasure of seeing Huey Lucas in the [facility].    HPI and Plan:   I had the pleasure of seeing Huey Lucas and his wife today in cardiology clinic follow up. He is a pleasant 69 year old patient of Dr. Farmer.    Mr. Lucas was admitted December 5 for ST elevation MI.  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  He has CKD with a baseline of 1.5-2.  He has type 2 insulin-dependent diabetes, a history of hepatitis C status post Harvoni treatment, hypertension, hyperlipidemia and untreated sleep apnea.  He has a history of smoking.    He was admitted with acute onset chest pain.  His troponins peaked at 46.  He was taken to the Cath Lab where he was found to have 100% occluded mid RCA lesion which was subsequently stented with a  3.5 x 30 mm drug-eluting stent.  He had mild disease in his LAD, he had a 80% second marginal lesion and was recommended to return for staged left circumflex intervention.  His echocardiogram showed mild inferior wall hypokinesis and EF of 50 to 55%.  He had aortic valve sclerosis noted.    He was discharged on Brilinta and aspirin.  He was started on Toprol-XL 50 mg daily.  His lisinopril was increased from 20 to 40 mg.  He was given nitro and told not to use it within 48 hours of his Viagra, which I reiterated again today.    Today he is doing okay, he still has some shortness of breath.  He has not started cardiac rehab.  He has ecchymosis in the area of the groin but otherwise is doing well.  He is set up for a staged procedure next Friday.  His labs were checked today and his creatinine has increased to 1.8 (from 1.2).      Physical Exam  Please see Below     Assessment and Plan  1.  Recent  inferior STEMI.  He had RCA stenting as it is scheduled for a staged intervention to his left circumflex next Friday.  I am little concerned about his renal function.  I have asked him to stop his lisinopril.  Drink some extra fluids, and will check BMP next Thursday, the day before his cath.  He continues on good medical management with statin therapy, dual antiplatelet therapy and beta blockade.    I did review his previous coronary angiogram with him.  We discussed the plan for staged intervention.  He would like to proceed.  The procedure, risks and benefits of coronary angiography were discussed with the patient in detail. The risks discussed include risk of heart attack, stroke, arrhythmia, bleeding, vascular trauma/complications, possible urgent bypass surgery, and death. We discussed that contrast is used during the procedure which can potentially damage the kidney. We discussed other diagnostics including possible FFR. The procedure is low risk. The patient may go home the same day and will need a .  Finally we discussed that stents may be used requiring dual antiplatelet therapy for possible a year or more, which increases the risk for bleeding. Patient would like to proceed. Consent will be obtained in the cath lab. This patient is medically optimized, sis  blood pressure is controlled, he is on statin and dual antiplatelet therapy.    2.  Hyperlipidemia.  His LDL looked okay when it was initially checked, but his triglycerides were 289 which leads me to suspect its falsely low.  He was started on Lipitor 80, down the road  we should relook at his lipids.    3. Hypertension.  Controlled.  I am going to stop his lisinopril because of his renal insufficiency.  He is scheduled for cardiac rehab next week, if his blood pressure is above 140 then I would add 2.5 of amlodipine, otherwise we will see how he does off of the ACE inhibitor.      Thank you for allowing me to care for Huey Calvo Shayy today.   He has follow-up arranged with him after his staged intervention.    OFELIA Brown, CNP  Cardiology    Voice recognition software was used for this note, I have reviewed this note, but errors may have been missed.    Orders Placed This Encounter   Procedures     Basic metabolic panel     No orders of the defined types were placed in this encounter.    Medications Discontinued During This Encounter   Medication Reason     lisinopril (PRINIVIL/ZESTRIL) 40 MG tablet          CURRENT MEDICATIONS:  Current Outpatient Medications   Medication Sig Dispense Refill     aspirin (ASA) 81 MG EC tablet Take 1 tablet (81 mg) by mouth daily 30 tablet 1     atorvastatin (LIPITOR) 80 MG tablet Take 1 tablet (80 mg) by mouth daily 30 tablet 1     BuPROPion HCl (WELLBUTRIN XL PO) Take 150 mg by mouth daily       insulin aspart (NOVOLOG FLEXPEN) 100 UNIT/ML injection Inject 20 Units Subcutaneous 3 times daily (with meals) Base- 20 Units per meal  Add 3 units for every 50 over 150 Blood Glucose reading  If Blood Glucose is :  151-199 = 20 units + 3 = 23 Units  200-249 = 20 Units + 6 = 26 Units  250-299 = 20 units + 9 = 29 Units       insulin detemir (LEVEMIR VIAL) 100 UNIT/ML vial Inject 40 Units Subcutaneous every morning (before breakfast)       metoprolol succinate ER (TOPROL-XL) 50 MG 24 hr tablet Take 1 tablet (50 mg) by mouth daily 30 tablet 0     nitroGLYcerin (NITROSTAT) 0.4 MG sublingual tablet For chest pain place 1 tablet under the tongue every 5 minutes for 3 doses. If symptoms persist 5 minutes after 2 nd dose call 911. 30 tablet 1     sildenafil (VIAGRA) 100 MG tablet Take 100 mg by mouth daily as needed       testosterone cypionate (DEPOTESTOSTERONE) 100 MG/ML injection Inject 150 mg into the muscle every 14 days       ticagrelor (BRILINTA) 90 MG tablet Take 1 tablet (90 mg) by mouth every 12 hours 60 tablet 1     zolpidem (AMBIEN) 10 MG tablet Take 10 mg by mouth nightly as needed for sleep       order for DME  Equipment being ordered: Walker Wheels () and Walker ()  Treatment Diagnosis: impaired gait 1 each 0       ALLERGIES     Allergies   Allergen Reactions     Levaquin Other (See Comments)     Swelling in knees and pain in chest, muscle aches.  Was hospitalized at [facility] 12/09.       PAST MEDICAL HISTORY:  Past Medical History:   Diagnosis Date     AAA (abdominal aortic aneurysm) (H)     surgery 2011, s/p EVAR 12-30-09     Arthritis     osteoarthritis     Baker's cyst of knee      BPH (benign prostatic hyperplasia)      Chronic anemia      Chronic pain syndrome      Chronic renal insufficiency      CKD (chronic kidney disease)      Complex regional pain syndrome      Cyst of pituitary gland (H)     surgery 2011     Depression      Diabetes mellitus (H)     type II, on oral medications     Erectile dysfunction      Hepatitis C, chronic (H)     history of; resolved per pt (12/07/15)     Hypertension      Hypogonadism male      Late effects of acute poliomyelitis      Polio      Polyarthritis     inflammatory     Pulmonary hypertension (H)     H/O     Pulmonary hypertension (H)      Pulmonary hypertension (H)      Pure hypercholesterolemia      Renal stone      Right ankle pain      S/P insertion of spinal cord stimulator      Sleep apnea     C PAP not using since 4/2015       PAST SURGICAL HISTORY:  Past Surgical History:   Procedure Laterality Date     ABDOMEN SURGERY      AAA repair 2011     ARTHRODESIS ANKLE Left 4/13/2015    Procedure: ARTHRODESIS ANKLE;  Surgeon: Kim Martinez MD;  Location:  [facility]     ARTHRODESIS ANKLE Left 3/4/2019    Procedure: LEFT REVISION ARTHRODESIS ANKLE  AND BUNIONECTOMY ;  Surgeon: Kim Martinez MD;  Location:  OR     ARTHRODESIS FOOT  8/18/2014    Procedure: ARTHRODESIS FOOT;  Surgeon: Kim Martinez MD;  Location: [facility]     ARTHROPLASTY KNEE  5/1/2012    Procedure:ARTHROPLASTY KNEE; LEFT TOTAL KNEE ARTHROPLASTY (DEPUY)^; Surgeon:DENISE BLOUNT; Location:  OR     ARTHROPLASTY KNEE Right 1/30/2017    Procedure: ARTHROPLASTY KNEE;  Surgeon: Josias Quintana MD;  Location:  OR     ARTHROSCOPY SHOULDER ROTATOR CUFF REPAIR  7/17/2013    Procedure: ARTHROSCOPY SHOULDER ROTATOR CUFF REPAIR;  RIGHT SHOULDER ARTHROSCOPIC ROTATOR CUFF REPAIR, SUBACROMIAL DECOMPRESSION WITH ACROMIOPLASTY, BICEP TENODESIS. ;  Surgeon: Josias Quintana MD;  Location: [facility]     BACK SURGERY  6/2011    spinal cord stimulator removed     BUNIONECTOMY Right 12/7/2015    Procedure: BUNIONECTOMY;  Surgeon: Kim Martinez MD;  Location: [facility]     BUNIONECTOMY Left 3/4/2019    Procedure: Bunionectomy;  Surgeon: Kim Martinez MD;  Location:  OR     COLONOSCOPY       CV HEART CATHETERIZATION WITH POSSIBLE INTERVENTION N/A 12/5/2019    Procedure: Heart Catheterization;  Surgeon: Howard Santana MD;  Location:  HEART CARDIAC CATH LAB     ENT SURGERY  7 years old    tonsillectomy     ESOPHAGOGASTRODUODENOSCOPY       EXCISE CYST GENERIC (LOCATION) Right 3/7/2016    Procedure: EXCISE CYST GENERIC (LOCATION);  Surgeon: Kim Martinez MD;  Location: [facility]     GI SURGERY  in high school    drain pilonidal cyst     GRAFT BONE FROM ILIAC CREST Right 3/7/2016    Procedure: GRAFT BONE FROM ILIAC CREST;  Surgeon: Kim Martinez MD;  Location: [facility]     HEAD & NECK SURGERY  2/6/15    excision of cyst back of neck     IRRIGATION AND DEBRIDEMENT LOWER EXTREMITY, COMBINED Right 10/9/2017    Procedure: OPEN RIGHT ANKLE MEDIAL GUTTER DEBRIDEMENT ;  Surgeon: Kim Martinez MD;  Location: [facility]     ORTHOPEDIC SURGERY      right ankle replacement     ORTHOPEDIC SURGERY  5/01    arthroscopy right knee     ORTHOPEDIC SURGERY  7/01    right total ankle with subtalar fusion     ORTHOPEDIC SURGERY  3/05, 9/10    right ankle revision x 2     ORTHOPEDIC SURGERY  8/2014    L naviculocuneiform fusion, gastrocnemius lengthening and 2nd hammer toe repair     PITUITARY SURGERY  7/2011     transsphenoidal fenestration of pituitary cyst     REMOVE HARDWARE FOOT Left 2015    Procedure: REMOVE HARDWARE FOOT;  Surgeon: Kim Martinez MD;  Location: [facility]       FAMILY HISTORY:  Family History   Problem Relation Age of Onset     Lymphoma Mother      Abdominal Aortic Aneurysm Father          of AAA     Alcoholism Paternal Grandfather        SOCIAL HISTORY:  Social History     Socioeconomic History     Marital status:      Spouse name: None     Number of children: None     Years of education: None     Highest education level: None   Occupational History     None   Social Needs     Financial resource strain: None     Food insecurity:     Worry: None     Inability: None     Transportation needs:     Medical: None     Non-medical: None   Tobacco Use     Smoking status: Former Smoker     Packs/day: 1.50     Years: 20.00     Pack years: 30.00     Types: Cigarettes, Cigars     Last attempt to quit: 2000     Years since quittin.9     Smokeless tobacco: Never Used     Tobacco comment: quit    Substance and Sexual Activity     Alcohol use: No     Drug use: Yes     Frequency: 1.0 times per week     Types: Marijuana     Sexual activity: None   Lifestyle     Physical activity:     Days per week: None     Minutes per session: None     Stress: None   Relationships     Social connections:     Talks on phone: None     Gets together: None     Attends [employer] witness service: None     Active member of club or organization: None     Attends meetings of clubs or organizations: None     Relationship status: None     Intimate partner violence:     Fear of current or ex partner: None     Emotionally abused: None     Physically abused: None     Forced sexual activity: None   Other Topics Concern     Parent/sibling w/ CABG, MI or angioplasty before 65F 55M? Not Asked   Social History Narrative     None       Review of Systems:  Skin:  Negative       Eyes:  Positive for glasses    ENT:  Negative       Respiratory:  Positive for shortness of breath     Cardiovascular:    Positive for;dizziness;lightheadedness occ.  Gastroenterology: Negative      Genitourinary:  Negative      Musculoskeletal:  Negative      Neurologic:  Positive for      Psychiatric:  Negative      Heme/Lymph/Imm:  Positive for allergies    Endocrine:  Positive for diabetes      Physical Exam:  Vitals: /71   Pulse 69   Ht 1.829 m (6')   Wt 115.4 kg (254 lb 8 oz)   BMI 34.52 kg/m       Constitutional:  cooperative obese      Skin:  warm and dry to the touch          Head:  normocephalic        Eyes:  pupils equal and round        Lymph:      ENT:  no pallor or cyanosis        Neck:  JVP normal        Respiratory:  clear to auscultation         Cardiac: regular rhythm;normal S1 and S2                                                         GI:    obese      Extremities and Muscular Skeletal:  no edema              Neurological:  affect appropriate        Psych:  Alert and Oriented x 3    Encounter Diagnosis   Name Primary?     Coronary artery disease involving native coronary artery of native heart without angina pectoris        Recent Lab Results:  LIPID RESULTS:  Lab Results   Component Value Date    CHOL 146 12/05/2019    HDL 34 (L) 12/05/2019    LDL 54 12/05/2019    TRIG 289 (H) 12/05/2019       LIVER ENZYME RESULTS:  Lab Results   Component Value Date    AST 21 12/05/2019    ALT 45 12/05/2019       CBC RESULTS:  Lab Results   Component Value Date    WBC 10.4 12/13/2019    RBC 4.78 12/13/2019    HGB 15.2 12/13/2019    HCT 47.1 12/13/2019    MCV 99 12/13/2019    MCH 31.8 12/13/2019    MCHC 32.3 12/13/2019    RDW 13.5 12/13/2019     12/13/2019       BMP RESULTS:  Lab Results   Component Value Date     (L) 12/13/2019    POTASSIUM 4.9 12/13/2019    CHLORIDE 103 12/13/2019    CO2 22 (L) 12/13/2019    ANIONGAP 13.9 12/13/2019     (H) 12/13/2019    BUN 34 (H) 12/13/2019    CR 1.81 (H) 12/13/2019    GFRESTIMATED 37 (L)  12/13/2019    GFRESTBLACK 45 (L) 12/13/2019    REZA 9.9 12/13/2019        A1C RESULTS:  Lab Results   Component Value Date    A1C 11.3 (H) 12/05/2019       INR RESULTS:  Lab Results   Component Value Date    INR 1.03 12/05/2019    INR 0.86 11/24/2006           CC  Terese Antonio PA-C  [address]  [place] [id number]                    Thank you for allowing me to participate in the care of your patient.      Sincerely,     OFELIA Davidson [facility]    cc:   Terese Antonio PA-C  [address]  [place] [id number]

## 2019-12-17 ENCOUNTER — HOSPITAL ENCOUNTER (OUTPATIENT)
Dept: CARDIAC REHAB | Facility: CLINIC | Age: 69
End: 2019-12-17
Attending: INTERNAL MEDICINE
Payer: MEDICARE

## 2019-12-17 ENCOUNTER — TELEPHONE (OUTPATIENT)
Dept: CARDIOLOGY | Facility: CLINIC | Age: 69
End: 2019-12-17

## 2019-12-17 DIAGNOSIS — I21.3 ST ELEVATION MYOCARDIAL INFARCTION (STEMI), UNSPECIFIED ARTERY (H): ICD-10-CM

## 2019-12-17 DIAGNOSIS — Z96.651 S/P TOTAL KNEE REPLACEMENT USING CEMENT, RIGHT: ICD-10-CM

## 2019-12-17 PROCEDURE — 93797 PHYS/QHP OP CAR RHAB WO ECG: CPT | Mod: 59

## 2019-12-17 PROCEDURE — 93798 PHYS/QHP OP CAR RHAB W/ECG: CPT

## 2019-12-17 PROCEDURE — 40000575 ZZH STATISTIC OP CARDIAC VISIT #2

## 2019-12-17 PROCEDURE — 40000116 ZZH STATISTIC OP CR VISIT

## 2019-12-17 RX ORDER — SODIUM CHLORIDE 9 MG/ML
INJECTION, SOLUTION INTRAVENOUS CONTINUOUS
Status: CANCELLED | OUTPATIENT
Start: 2019-12-17

## 2019-12-17 RX ORDER — POTASSIUM CHLORIDE 750 MG/1
20 TABLET, EXTENDED RELEASE ORAL
Status: CANCELLED | OUTPATIENT
Start: 2019-12-17

## 2019-12-17 RX ORDER — ASPIRIN 81 MG/1
81 TABLET ORAL DAILY
Status: CANCELLED | OUTPATIENT
Start: 2019-12-17 | End: 2019-12-19

## 2019-12-17 RX ORDER — LIDOCAINE 40 MG/G
CREAM TOPICAL
Status: CANCELLED | OUTPATIENT
Start: 2019-12-17

## 2019-12-17 NOTE — TELEPHONE ENCOUNTER
Called patient to review coronary angiogram/cath prep instructions.    Patient is scheduled for a Left heart cath/coronary angiogram at [facility], on date 12/20/19.  Check in time is at 7:00AM and procedure time is at 11:00AM.    Advised patient not eat or drink after midnight on 12/19/19 .      Patient is not taking a diuretic.     Advised to discuss insulin dosing with PMD.     Patient is not taking Metformin or other diabetic medications.     Patient is not taking an anticoagulant.     Patient should continue their current dose of Aspirin with their other daily medications the morning of the procedure with small sips of water.     Verified patient does NOT have an allergy to contrast dye.     Verified patient has a responsible adult to drive them home from the hospital and stay with them for 24 hours after the procedure.     Confirmed follow-up appointment scheduled 12/27/19 date at 10:10AM time with Kari Samuel CNP provider at the [facility].     Patient had no questions about their prep instructions.     Brissa CERON  [facility]   12/17/19 1:30 PM

## 2019-12-19 DIAGNOSIS — I25.10 CORONARY ARTERY DISEASE INVOLVING NATIVE CORONARY ARTERY OF NATIVE HEART WITHOUT ANGINA PECTORIS: ICD-10-CM

## 2019-12-19 LAB
ANION GAP SERPL CALCULATED.3IONS-SCNC: 13.6 MMOL/L (ref 6–17)
BUN SERPL-MCNC: 24 MG/DL (ref 7–30)
CALCIUM SERPL-MCNC: 10.4 MG/DL (ref 8.5–10.5)
CHLORIDE SERPL-SCNC: 101 MMOL/L (ref 98–107)
CO2 SERPL-SCNC: 25 MMOL/L (ref 23–29)
CREAT SERPL-MCNC: 1.61 MG/DL (ref 0.7–1.3)
GFR SERPL CREATININE-BSD FRML MDRD: 43 ML/MIN/{1.73_M2}
GLUCOSE SERPL-MCNC: 210 MG/DL (ref 70–105)
POTASSIUM SERPL-SCNC: 4.6 MMOL/L (ref 3.5–5.1)
SODIUM SERPL-SCNC: 135 MMOL/L (ref 136–145)

## 2019-12-19 PROCEDURE — 80048 BASIC METABOLIC PNL TOTAL CA: CPT | Performed by: NURSE PRACTITIONER

## 2019-12-19 PROCEDURE — 36415 COLL VENOUS BLD VENIPUNCTURE: CPT | Performed by: NURSE PRACTITIONER

## 2019-12-20 ENCOUNTER — SURGERY (OUTPATIENT)
Age: 69
End: 2019-12-20
Payer: MEDICARE

## 2019-12-20 ENCOUNTER — HOSPITAL ENCOUNTER (OUTPATIENT)
Facility: CLINIC | Age: 69
Discharge: HOME | End: 2019-12-20
Admitting: INTERNAL MEDICINE
Payer: MEDICARE

## 2019-12-20 VITALS
HEART RATE: 69 BPM | HEIGHT: 72 IN | BODY MASS INDEX: 34.47 KG/M2 | DIASTOLIC BLOOD PRESSURE: 81 MMHG | WEIGHT: 254.5 LBS | TEMPERATURE: 97 F | OXYGEN SATURATION: 98 % | RESPIRATION RATE: 14 BRPM | SYSTOLIC BLOOD PRESSURE: 139 MMHG

## 2019-12-20 DIAGNOSIS — Z96.651 S/P TOTAL KNEE REPLACEMENT USING CEMENT, RIGHT: Primary | ICD-10-CM

## 2019-12-20 DIAGNOSIS — I25.10 CORONARY ARTERY DISEASE INVOLVING NATIVE CORONARY ARTERY OF NATIVE HEART WITHOUT ANGINA PECTORIS: ICD-10-CM

## 2019-12-20 PROBLEM — Z98.890 STATUS POST CORONARY ANGIOGRAM: Status: ACTIVE | Noted: 2019-12-20

## 2019-12-20 LAB
ANION GAP SERPL CALCULATED.3IONS-SCNC: 4 MMOL/L (ref 3–14)
APTT PPP: 30 SEC (ref 22–37)
BUN SERPL-MCNC: 25 MG/DL (ref 7–30)
CALCIUM SERPL-MCNC: 9.2 MG/DL (ref 8.5–10.1)
CHLORIDE SERPL-SCNC: 108 MMOL/L (ref 94–109)
CO2 SERPL-SCNC: 23 MMOL/L (ref 20–32)
CREAT SERPL-MCNC: 1.33 MG/DL (ref 0.66–1.25)
ERYTHROCYTE [DISTWIDTH] IN BLOOD BY AUTOMATED COUNT: 13.2 % (ref 10–15)
GFR SERPL CREATININE-BSD FRML MDRD: 54 ML/MIN/{1.73_M2}
GLUCOSE BLDC GLUCOMTR-MCNC: 238 MG/DL (ref 70–99)
GLUCOSE SERPL-MCNC: 271 MG/DL (ref 70–99)
HCT VFR BLD AUTO: 46.1 % (ref 40–53)
HGB BLD-MCNC: 15.2 G/DL (ref 13.3–17.7)
INR PPP: 1.01 (ref 0.86–1.14)
KCT BLD-ACNC: 380 SEC (ref 75–150)
MCH RBC QN AUTO: 31.7 PG (ref 26.5–33)
MCHC RBC AUTO-ENTMCNC: 33 G/DL (ref 31.5–36.5)
MCV RBC AUTO: 96 FL (ref 78–100)
PLATELET # BLD AUTO: 184 10E9/L (ref 150–450)
POTASSIUM SERPL-SCNC: 4.6 MMOL/L (ref 3.4–5.3)
RBC # BLD AUTO: 4.79 10E12/L (ref 4.4–5.9)
SODIUM SERPL-SCNC: 135 MMOL/L (ref 133–144)
WBC # BLD AUTO: 9.8 10E9/L (ref 4–11)

## 2019-12-20 PROCEDURE — 40000065 ZZH STATISTIC EKG NON-CHARGEABLE

## 2019-12-20 PROCEDURE — 36415 COLL VENOUS BLD VENIPUNCTURE: CPT

## 2019-12-20 PROCEDURE — 99153 MOD SED SAME PHYS/QHP EA: CPT | Performed by: INTERNAL MEDICINE

## 2019-12-20 PROCEDURE — 82962 GLUCOSE BLOOD TEST: CPT

## 2019-12-20 PROCEDURE — C9600 PERC DRUG-EL COR STENT SING: HCPCS | Performed by: INTERNAL MEDICINE

## 2019-12-20 PROCEDURE — 27210794 ZZH OR GENERAL SUPPLY STERILE: Performed by: INTERNAL MEDICINE

## 2019-12-20 PROCEDURE — 92928 PRQ TCAT PLMT NTRAC ST 1 LES: CPT | Mod: LC | Performed by: INTERNAL MEDICINE

## 2019-12-20 PROCEDURE — 25000128 H RX IP 250 OP 636: Performed by: INTERNAL MEDICINE

## 2019-12-20 PROCEDURE — C1887 CATHETER, GUIDING: HCPCS | Performed by: INTERNAL MEDICINE

## 2019-12-20 PROCEDURE — C1894 INTRO/SHEATH, NON-LASER: HCPCS | Performed by: INTERNAL MEDICINE

## 2019-12-20 PROCEDURE — 85730 THROMBOPLASTIN TIME PARTIAL: CPT

## 2019-12-20 PROCEDURE — 85347 COAGULATION TIME ACTIVATED: CPT

## 2019-12-20 PROCEDURE — 93005 ELECTROCARDIOGRAM TRACING: CPT

## 2019-12-20 PROCEDURE — C1769 GUIDE WIRE: HCPCS | Performed by: INTERNAL MEDICINE

## 2019-12-20 PROCEDURE — 80048 BASIC METABOLIC PNL TOTAL CA: CPT

## 2019-12-20 PROCEDURE — 25800030 ZZH RX IP 258 OP 636: Performed by: INTERNAL MEDICINE

## 2019-12-20 PROCEDURE — C1725 CATH, TRANSLUMIN NON-LASER: HCPCS | Performed by: INTERNAL MEDICINE

## 2019-12-20 PROCEDURE — 99152 MOD SED SAME PHYS/QHP 5/>YRS: CPT | Performed by: INTERNAL MEDICINE

## 2019-12-20 PROCEDURE — 85610 PROTHROMBIN TIME: CPT

## 2019-12-20 PROCEDURE — 93010 ELECTROCARDIOGRAM REPORT: CPT | Mod: 76 | Performed by: INTERNAL MEDICINE

## 2019-12-20 PROCEDURE — C1874 STENT, COATED/COV W/DEL SYS: HCPCS | Performed by: INTERNAL MEDICINE

## 2019-12-20 PROCEDURE — 40000852 ZZH STATISTIC HEART CATH LAB OR EP LAB

## 2019-12-20 PROCEDURE — 85027 COMPLETE CBC AUTOMATED: CPT

## 2019-12-20 PROCEDURE — 25000125 ZZHC RX 250: Performed by: INTERNAL MEDICINE

## 2019-12-20 PROCEDURE — 40000235 ZZH STATISTIC TELEMETRY

## 2019-12-20 DEVICE — STENT RESOLUTE ONYX DE 2.7FR 4.00X22MM RONYX DE40022UX: Type: IMPLANTABLE DEVICE | Status: FUNCTIONAL

## 2019-12-20 RX ORDER — SODIUM CHLORIDE 9 MG/ML
INJECTION, SOLUTION INTRAVENOUS CONTINUOUS
Status: DISCONTINUED | OUTPATIENT
Start: 2019-12-20 | End: 2019-12-20 | Stop reason: HOSPADM

## 2019-12-20 RX ORDER — HEPARIN SODIUM 1000 [USP'U]/ML
INJECTION, SOLUTION INTRAVENOUS; SUBCUTANEOUS
Status: DISCONTINUED | OUTPATIENT
Start: 2019-12-20 | End: 2019-12-20 | Stop reason: HOSPADM

## 2019-12-20 RX ORDER — LIDOCAINE 40 MG/G
CREAM TOPICAL
Status: DISCONTINUED | OUTPATIENT
Start: 2019-12-20 | End: 2019-12-20 | Stop reason: HOSPADM

## 2019-12-20 RX ORDER — ATROPINE SULFATE 0.1 MG/ML
0.5 INJECTION INTRAVENOUS EVERY 5 MIN PRN
Status: DISCONTINUED | OUTPATIENT
Start: 2019-12-20 | End: 2019-12-20 | Stop reason: HOSPADM

## 2019-12-20 RX ORDER — NITROGLYCERIN 0.4 MG/1
0.4 TABLET SUBLINGUAL EVERY 5 MIN PRN
Status: DISCONTINUED | OUTPATIENT
Start: 2019-12-20 | End: 2019-12-20 | Stop reason: HOSPADM

## 2019-12-20 RX ORDER — POTASSIUM CHLORIDE 1500 MG/1
20 TABLET, EXTENDED RELEASE ORAL
Status: DISCONTINUED | OUTPATIENT
Start: 2019-12-20 | End: 2019-12-20 | Stop reason: HOSPADM

## 2019-12-20 RX ORDER — IOPAMIDOL 755 MG/ML
INJECTION, SOLUTION INTRAVASCULAR
Status: DISCONTINUED | OUTPATIENT
Start: 2019-12-20 | End: 2019-12-20 | Stop reason: HOSPADM

## 2019-12-20 RX ORDER — ACETAMINOPHEN 325 MG/1
650 TABLET ORAL EVERY 4 HOURS PRN
Status: DISCONTINUED | OUTPATIENT
Start: 2019-12-20 | End: 2019-12-20 | Stop reason: HOSPADM

## 2019-12-20 RX ORDER — FLUMAZENIL 0.1 MG/ML
0.2 INJECTION, SOLUTION INTRAVENOUS
Status: DISCONTINUED | OUTPATIENT
Start: 2019-12-20 | End: 2019-12-20 | Stop reason: HOSPADM

## 2019-12-20 RX ORDER — FENTANYL CITRATE 50 UG/ML
25-50 INJECTION, SOLUTION INTRAMUSCULAR; INTRAVENOUS
Status: DISCONTINUED | OUTPATIENT
Start: 2019-12-20 | End: 2019-12-20 | Stop reason: HOSPADM

## 2019-12-20 RX ORDER — NALOXONE HYDROCHLORIDE 0.4 MG/ML
.2-.4 INJECTION, SOLUTION INTRAMUSCULAR; INTRAVENOUS; SUBCUTANEOUS
Status: DISCONTINUED | OUTPATIENT
Start: 2019-12-20 | End: 2019-12-20 | Stop reason: HOSPADM

## 2019-12-20 RX ORDER — ASPIRIN 81 MG/1
81 TABLET ORAL DAILY
Status: DISCONTINUED | OUTPATIENT
Start: 2019-12-20 | End: 2019-12-20 | Stop reason: HOSPADM

## 2019-12-20 RX ORDER — FENTANYL CITRATE 50 UG/ML
INJECTION, SOLUTION INTRAMUSCULAR; INTRAVENOUS
Status: DISCONTINUED | OUTPATIENT
Start: 2019-12-20 | End: 2019-12-20 | Stop reason: HOSPADM

## 2019-12-20 RX ORDER — NALOXONE HYDROCHLORIDE 0.4 MG/ML
.1-.4 INJECTION, SOLUTION INTRAMUSCULAR; INTRAVENOUS; SUBCUTANEOUS
Status: DISCONTINUED | OUTPATIENT
Start: 2019-12-20 | End: 2019-12-20 | Stop reason: HOSPADM

## 2019-12-20 RX ADMIN — FENTANYL CITRATE 50 MCG: 50 INJECTION, SOLUTION INTRAMUSCULAR; INTRAVENOUS at 14:15

## 2019-12-20 RX ADMIN — MIDAZOLAM 1 MG: 1 INJECTION INTRAMUSCULAR; INTRAVENOUS at 14:15

## 2019-12-20 RX ADMIN — MIDAZOLAM 1 MG: 1 INJECTION INTRAMUSCULAR; INTRAVENOUS at 14:43

## 2019-12-20 RX ADMIN — MIDAZOLAM 1 MG: 1 INJECTION INTRAMUSCULAR; INTRAVENOUS at 14:21

## 2019-12-20 RX ADMIN — MIDAZOLAM 1 MG: 1 INJECTION INTRAMUSCULAR; INTRAVENOUS at 14:11

## 2019-12-20 RX ADMIN — FENTANYL CITRATE 50 MCG: 50 INJECTION, SOLUTION INTRAMUSCULAR; INTRAVENOUS at 14:11

## 2019-12-20 RX ADMIN — HEPARIN SODIUM 15000 UNITS: 1000 INJECTION, SOLUTION INTRAVENOUS; SUBCUTANEOUS at 14:19

## 2019-12-20 RX ADMIN — LIDOCAINE HYDROCHLORIDE 1 ML: 10 INJECTION, SOLUTION EPIDURAL; INFILTRATION; INTRACAUDAL; PERINEURAL at 14:15

## 2019-12-20 RX ADMIN — IOPAMIDOL 125 ML: 755 INJECTION, SOLUTION INTRAVENOUS at 14:58

## 2019-12-20 RX ADMIN — SODIUM CHLORIDE: 9 INJECTION, SOLUTION INTRAVENOUS at 14:48

## 2019-12-20 RX ADMIN — SODIUM CHLORIDE: 9 INJECTION, SOLUTION INTRAVENOUS at 07:27

## 2019-12-20 RX ADMIN — SODIUM CHLORIDE: 9 INJECTION, SOLUTION INTRAVENOUS at 15:26

## 2019-12-20 ASSESSMENT — MIFFLIN-ST. JEOR: SCORE: 1952.4

## 2019-12-20 NOTE — PROGRESS NOTES
Care Suites Admission Nursing Note    Reason for admission: left heart cath  CS arrival time: 0700  Accompanied by: wife Disha  Name/phone of DC : Disha [phone]    Medications held: none took long acting insulin as instructed  Consent signed: per MD  Abnormal assessment/labs: glucose 271, pt took insulin and is NPO  If abnormal, provider notified: yes  Education/questions answered: yes  Plan: proceed as planned 4 hours fluids for renal function, heart cath at 1100

## 2019-12-20 NOTE — PROGRESS NOTES
Called cath lab for update pt informed of continued delayed plan for 1345 start time.  Pt understanding up to bathroom

## 2019-12-20 NOTE — Clinical Note
Max pressure = 6 nithya. Total duration = 20 seconds.     Max pressure = 6 nithya. Total duration = 20 seconds.    Balloon reinflated a second time: Max pressure = 6 nithya. Total duration = 20 seconds.

## 2019-12-20 NOTE — PRE-PROCEDURE
GENERAL PRE-PROCEDURE:   Procedure:  Coronary intervention  Date/Time:  12/20/2019 2:06 PM    Written consent obtained?: Yes    Risks and benefits: Risks, benefits and alternatives were discussed    Consent given by:  Patient  Patient states understanding of procedure being performed: Yes    Patient's understanding of procedure matches consent: Yes    Procedure consent matches procedure scheduled: Yes    Expected level of sedation:  Moderate  Appropriately NPO:  Yes  ASA Class:  Class 3- Severe systemic disease, definite functional limitations  Mallampati  :  Grade 2- soft palate, base of uvula, tonsillar pillars, and portion of posterior pharyngeal wall visible  Lungs:  Lungs clear with good breath sounds bilaterally  Heart:  Normal heart sounds and rate  History & Physical reviewed:  History and physical reviewed and no updates needed  Statement of review:  I have reviewed the lab findings, diagnostic data, medications, and the plan for sedation

## 2019-12-20 NOTE — PROGRESS NOTES
Care Suites Post-Procedure Note    Procedure: left heart cath with stent to OM2, thru right radial  CS arrival time: 1515  Accompanied by: RN  Concerns/abnormal assessment after procedure: none at this time  Plan: monitor, possible admission to obs to finish bedrest,  Stent card given to wife.  VSS.  mendez water ordering meal tray

## 2019-12-20 NOTE — DISCHARGE INSTRUCTIONS
Cardiac Angioplasty/Stent Discharge Instructions - Radial    After you go home:      Have an adult stay with you until tomorrow.    Drink extra fluids for 2 days.    You may resume your normal diet.    No smoking       For 24 hours - due to the sedation you received:    Relax and take it easy.    Do NOT make any important or legal decisions.    Do NOT drive or operate machines at home or at work.    Do NOT drink alcohol.    Care of Wrist Puncture Site:      For the first 24 hrs - check the puncture site every 1-2 hours while awake.    It is normal to have soreness at the puncture site and mild tingling in your hand for up to 3 days.    Remove the bandaid after 24 hours. If there is minor oozing, apply another bandaid and remove it after 12 hours.    You may shower tomorrow.  Do NOT take a bath, or use a hot tub or pool for at least 3 days. Do NOT scrub the site. Do not use lotion or powder near the puncture site.           Activity:          For 2 days:     do not use your hand or arm to support your weight (such as rising from a chair)     do not bend your wrist (such as lifting a garage door).    do not lift more than 5 pounds or exercise your arm (such as tennis, golf or bowling).    Do NOT do any heavy activity such as exercise, lifting, or straining.     Bleeding:      If you start bleeding from the site in your wrist, sit down and press firmly on/above the site for 10 minutes.     Once bleeding stops, keep arm still for 2 hours.     Call [facility] as soon as you can.       Call 911 right away if you have heavy bleeding or bleeding that does not stop.      Medicines:      If you are taking an antiplatelet medication such as Plavix, Brilinta or Effient, do not stop taking it until you talk to your cardiologist.        Take your medications, including blood thinners, unless your provider tells you not to.     If you have stopped any medicines, check with your provider about when to restart them.    Follow Up  Appointments:      Follow up with [facility] Nurse Practitioner at [facility] of patient preference in 7-10 days.    Cardiac Rehab will contact you for follow up care.    Call the clinic if:      You have a large or growing hard lump around the site.    The site is red, swollen, hot or tender.    Blood or fluid is draining from the site.    You have chills or a fever greater than 101 F (38 C).    Your arm feels numb, cool or changes color.    You have hives, a rash or unusual itching.    Any questions or concerns.    Other Instructions:      If you received a stent - carry your stent card with you at all times.      [facility] [place]:    [phone] [facility] (7 days a week)

## 2019-12-21 NOTE — DISCHARGE SUMMARY
Care Suites Discharge Nursing Note    Education/questions answered: yes  Patient DC location: home  Accompanied by: spouse  CS discharge time: 1930

## 2019-12-21 NOTE — PROGRESS NOTES
Care Suites Post-Procedure Note    Procedure: TR band right with stenting  CS arrival time: 1515  Accompanied by: cath lab RN  Concerns/abnormal assessment after procedure: no  Plan: monitor and move to discharge

## 2019-12-23 ENCOUNTER — TELEPHONE (OUTPATIENT)
Dept: CARDIOLOGY | Facility: CLINIC | Age: 69
End: 2019-12-23

## 2019-12-23 NOTE — TELEPHONE ENCOUNTER
Spoke with patient post discharge from care suites/observation on 12/20/19  Reviewed with patient Care of radial site. He states there is some tenderness and small bruise at the right radial site.  He has a follow up OV 12-27-19 with an LESLI.  Patient denies any questions about his medications or follow up care.

## 2019-12-26 LAB
INTERPRETATION ECG - MUSE: NORMAL
INTERPRETATION ECG - MUSE: NORMAL

## 2019-12-27 ENCOUNTER — OFFICE VISIT (OUTPATIENT)
Dept: CARDIOLOGY | Facility: CLINIC | Age: 69
End: 2019-12-27
Payer: MEDICARE

## 2019-12-27 ENCOUNTER — HOSPITAL ENCOUNTER (OUTPATIENT)
Dept: CARDIAC REHAB | Facility: CLINIC | Age: 69
End: 2019-12-27
Attending: INTERNAL MEDICINE
Payer: MEDICARE

## 2019-12-27 VITALS
DIASTOLIC BLOOD PRESSURE: 80 MMHG | WEIGHT: 251 LBS | HEART RATE: 68 BPM | BODY MASS INDEX: 34 KG/M2 | HEIGHT: 72 IN | SYSTOLIC BLOOD PRESSURE: 131 MMHG

## 2019-12-27 DIAGNOSIS — I25.10 CORONARY ARTERY DISEASE INVOLVING NATIVE CORONARY ARTERY OF NATIVE HEART WITHOUT ANGINA PECTORIS: Primary | ICD-10-CM

## 2019-12-27 DIAGNOSIS — E78.5 HYPERLIPIDEMIA LDL GOAL <70: ICD-10-CM

## 2019-12-27 DIAGNOSIS — I25.10 CORONARY ARTERY DISEASE INVOLVING NATIVE CORONARY ARTERY OF NATIVE HEART WITHOUT ANGINA PECTORIS: ICD-10-CM

## 2019-12-27 DIAGNOSIS — I10 BENIGN ESSENTIAL HYPERTENSION: ICD-10-CM

## 2019-12-27 DIAGNOSIS — G47.33 OSA (OBSTRUCTIVE SLEEP APNEA): ICD-10-CM

## 2019-12-27 LAB
ANION GAP SERPL CALCULATED.3IONS-SCNC: 9.4 MMOL/L (ref 6–17)
BUN SERPL-MCNC: 27 MG/DL (ref 7–30)
CALCIUM SERPL-MCNC: 9.5 MG/DL (ref 8.5–10.5)
CHLORIDE SERPL-SCNC: 101 MMOL/L (ref 98–107)
CO2 SERPL-SCNC: 28 MMOL/L (ref 23–29)
CREAT SERPL-MCNC: 1.72 MG/DL (ref 0.7–1.3)
GFR SERPL CREATININE-BSD FRML MDRD: 40 ML/MIN/{1.73_M2}
GLUCOSE SERPL-MCNC: 332 MG/DL (ref 70–105)
POTASSIUM SERPL-SCNC: 4.4 MMOL/L (ref 3.5–5.1)
SODIUM SERPL-SCNC: 134 MMOL/L (ref 136–145)

## 2019-12-27 PROCEDURE — 36415 COLL VENOUS BLD VENIPUNCTURE: CPT | Performed by: INTERNAL MEDICINE

## 2019-12-27 PROCEDURE — 93798 PHYS/QHP OP CAR RHAB W/ECG: CPT | Performed by: REHABILITATION PRACTITIONER

## 2019-12-27 PROCEDURE — 40000116 ZZH STATISTIC OP CR VISIT: Performed by: REHABILITATION PRACTITIONER

## 2019-12-27 PROCEDURE — 99214 OFFICE O/P EST MOD 30 MIN: CPT | Performed by: NURSE PRACTITIONER

## 2019-12-27 PROCEDURE — 80048 BASIC METABOLIC PNL TOTAL CA: CPT | Performed by: INTERNAL MEDICINE

## 2019-12-27 ASSESSMENT — MIFFLIN-ST. JEOR: SCORE: 1936.53

## 2019-12-27 NOTE — LETTER
12/27/2019    MD John Borges [address] [address]    RE: Huey Lucas       Dear Colleague,    I had the pleasure of seeing Huey Lucas in the [facility].    Cardiology Clinic Progress Note  Huey Lucas MRN# [id number]   YOB: 1950 Age: 69 year old     Reason For Visit:  Follow up   Primary Cardiologist:   Dr. 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  He was noted to have residual disease disease in the circumflex /OM and subsequently underwent a staged PCI with successful stenting to the OM 2 using a 4.0 x 22 mm [place] drug-eluting stent.  Follow-up echocardiogram demonstrated a low normal ejection fraction of 50 to 55%, mild inferior lateral wall hypokinesia, normal RV systolic function, and aortic valve sclerosis.  He returns to the office today for a 1 week follow-up.    He is feeling well on a cardiac standpoint, denies chest pain, shortness of breath, PND, orthopnea, presyncope, syncope, edema, heart racing, or palpitations.  His only complaint is mild right radial site tenderness.     Blood pressure is well controlled at 131/80, managed on metoprolol  BMP today showed a sodium of 134, potassium 4.4, BUN 27, creatinine 1.72, and GFR 40  Hemoglobin 15.2 and Platelet 184, no evidence of bleeding on DAPT.     Lipid panel demonstrated a total cholesterol of 146, HDL 34, LDL 54, triglycerides 289, atorvastatin was increased to 80 mg at the time of discharge.  BMI 34.04    He is scheduled  to begin cardiac rehab next week  Follows a healthy diet  Remains compliant with all medications.  Intolerant to CPAP                   Assessment and Plan:       1.  Coronary artery disease-inferior STEMI in early December status post drug-eluting stent to the RCA.  Residual disease in the circumflex/OM with a successful staged PCI (12/20/19) to the OM 2 using a 4.0 x 22 mm Kevin drug-eluting stent.  Follow-up echocardiogram demonstrated a low normal ejection fraction of 50 to 55%, mild inferior lateral wall hypokinesia, normal RV systolic function, and aortic valve sclerosis.  Continue on dual antiplatelet therapy x12 months uninterrupted, metoprolol, and statin.  Cardiac rehab, exercise, weight loss, and heart healthy diet.    2.  Hypertension -controlled on current medical therapy  3.  Hyperlipidemia -LDL at goal, 54.  Triglycerides elevated at 289, atorvastatin was increased to high-dose 80 mg at the time of discharge.  Scheduled to follow-up with a fasting lipid panel in 6 weeks.  4.  Chronic kidney disease -creatinine elevated today at 1.72 from previous 1.3, possibly related to contrast exposure.  Recommend follow-up BMP in 6 weeks  5.  Obstructive sleep apnea -intolerant to CPAP  6. Diabetes - Uncontrolled, last A1C 11.3, follows with PCP for management.                 Thank you for allowing me to participate in this delightful patient's care. I have recommended he follow up in 6 months with Dr. Santana or sooner if needed .       OFELIA Mayer CNP         Review of Systems:     Review of Systems:  Skin:  Negative     Eyes:  Positive for glasses  ENT:  Negative    Respiratory:  Positive for shortness of breath  Cardiovascular:    Positive for;dizziness;lightheadedness  Gastroenterology: Negative    Genitourinary:  Negative    Musculoskeletal:  Negative    Neurologic:  Positive for headaches  Psychiatric:  Negative    Heme/Lymph/Imm:  Positive for allergies  Endocrine:  Positive for diabetes               Physical Exam:     GEN:  In general, this is a obese male in no acute distress.  HEENT:  Pupils equal, round. Sclerae nonicteric. Clear oropharynx. Mucous membranes moist.  NECK: Supple, no masses appreciated. Trachea midline. No JVD   C/V:  Regular rate and rhythm, No murmur, rub or gallop. No S3 or RV heave.   RESP: Respirations are unlabored. No use of accessory muscles. Clear to auscultation bilaterally without wheezing, rales, or rhonchi.  GI: Abdomen soft, nontender, nondistended. No HSM appreciated.   EXTREM: No LE edema. No cyanosis or clubbing.  NEURO: Alert and oriented, cooperative. No obvious focal deficits.   PSYCH: Normal affect.  SKIN: Warm and dry. No rashes or petechiae appreciated. Right radial site, ecchymotic. No evidence of hematoma or bruit         Past Medical History:     Past Medical History:   Diagnosis Date     AAA (abdominal aortic aneurysm) (H)     surgery 2011, s/p EVAR 12-30-09     Arthritis     osteoarthritis     Baker's cyst of knee      BPH (benign prostatic hyperplasia)      CAD (coronary artery disease), native coronary artery      Chronic anemia      Chronic pain syndrome      Chronic renal insufficiency      CKD (chronic kidney disease)      Complex regional pain syndrome      Cyst of pituitary gland (H)     surgery 2011     Depression      Diabetes mellitus (H)     type II, on oral medications     Erectile dysfunction      Hepatitis C, chronic (H)     history of; resolved per pt (12/07/15)     Hypertension      Hypogonadism male      Late effects of acute poliomyelitis      Polio      Polyarthritis     inflammatory     Pulmonary hypertension (H)     H/O     Pulmonary hypertension (H)      Pulmonary hypertension (H)      Pure hypercholesterolemia      Renal stone      Right ankle pain      S/P insertion of spinal cord stimulator      Sleep apnea     C PAP not using since 4/2015     ST elevation myocardial infarction (STEMI) of inferior wall (H)               Past Surgical  History:     Past Surgical History:   Procedure Laterality Date     ABDOMEN SURGERY      AAA repair 2011     ARTHRODESIS ANKLE Left 4/13/2015    Procedure: ARTHRODESIS ANKLE;  Surgeon: Kim Martinez MD;  Location: [facility]     ARTHRODESIS ANKLE Left 3/4/2019    Procedure: LEFT REVISION ARTHRODESIS ANKLE  AND BUNIONECTOMY ;  Surgeon: Kim Martinez MD;  Location:  OR     ARTHRODESIS FOOT  8/18/2014    Procedure: ARTHRODESIS FOOT;  Surgeon: Kim Martinez MD;  Location: [facility]     ARTHROPLASTY KNEE  5/1/2012    Procedure:ARTHROPLASTY KNEE; LEFT TOTAL KNEE ARTHROPLASTY (DEPUY)^; Surgeon:DENISE QUINTANA; Location: OR     ARTHROPLASTY KNEE Right 1/30/2017    Procedure: ARTHROPLASTY KNEE;  Surgeon: Denise Quintana MD;  Location:  OR     ARTHROSCOPY SHOULDER ROTATOR CUFF REPAIR  7/17/2013    Procedure: ARTHROSCOPY SHOULDER ROTATOR CUFF REPAIR;  RIGHT SHOULDER ARTHROSCOPIC ROTATOR CUFF REPAIR, SUBACROMIAL DECOMPRESSION WITH ACROMIOPLASTY, BICEP TENODESIS. ;  Surgeon: Denise Quintana MD;  Location: [facility]     BACK SURGERY  6/2011    spinal cord stimulator removed     BUNIONECTOMY Right 12/7/2015    Procedure: BUNIONECTOMY;  Surgeon: Kim Martinez MD;  Location: [facility]     BUNIONECTOMY Left 3/4/2019    Procedure: Bunionectomy;  Surgeon: Kim Martinez MD;  Location:  OR     COLONOSCOPY       CV HEART CATHETERIZATION WITH POSSIBLE INTERVENTION N/A 12/5/2019    Procedure: Heart Catheterization;  Surgeon: Howard Santana MD;  Location:  HEART CARDIAC CATH LAB     CV HEART CATHETERIZATION WITH POSSIBLE INTERVENTION N/A 12/20/2019    SHIVA to OM2 successful without complications     ENT SURGERY  7 years old    tonsillectomy     ESOPHAGOGASTRODUODENOSCOPY       EXCISE CYST GENERIC (LOCATION) Right 3/7/2016    Procedure: EXCISE CYST GENERIC (LOCATION);  Surgeon: Kim Martinez MD;  Location: [facility]     GI SURGERY  in high school    drain pilonidal cyst     GRAFT BONE FROM ILIAC  CREST Right 3/7/2016    Procedure: GRAFT BONE FROM ILIAC CREST;  Surgeon: Kim Martinez MD;  Location: [facility]     HEAD & NECK SURGERY  2/6/15    excision of cyst back of neck     IRRIGATION AND DEBRIDEMENT LOWER EXTREMITY, COMBINED Right 10/9/2017    Procedure: OPEN RIGHT ANKLE MEDIAL GUTTER DEBRIDEMENT ;  Surgeon: Kim Martinez MD;  Location: [facility]     ORTHOPEDIC SURGERY      right ankle replacement     ORTHOPEDIC SURGERY  5/01    arthroscopy right knee     ORTHOPEDIC SURGERY  7/01    right total ankle with subtalar fusion     ORTHOPEDIC SURGERY  3/05, 9/10    right ankle revision x 2     ORTHOPEDIC SURGERY  8/2014    L naviculocuneiform fusion, gastrocnemius lengthening and 2nd hammer toe repair     PITUITARY SURGERY  7/2011    transsphenoidal fenestration of pituitary cyst     REMOVE HARDWARE FOOT Left 4/13/2015    Procedure: REMOVE HARDWARE FOOT;  Surgeon: Kim Martinez MD;  Location: [facility]              Allergies:   Levaquin       Data:   All laboratory data reviewed:    LAST CHOLESTEROL:  Lab Results   Component Value Date    CHOL 146 12/05/2019     Lab Results   Component Value Date    HDL 34 12/05/2019     Lab Results   Component Value Date    LDL 54 12/05/2019     Lab Results   Component Value Date    TRIG 289 12/05/2019     No results found for: CHOLHDLRATIO    LAST BMP:  Lab Results   Component Value Date     12/27/2019      Lab Results   Component Value Date    POTASSIUM 4.4 12/27/2019     Lab Results   Component Value Date    CHLORIDE 101 12/27/2019     Lab Results   Component Value Date    REZA 9.5 12/27/2019     Lab Results   Component Value Date    CO2 28 12/27/2019     Lab Results   Component Value Date    BUN 27 12/27/2019     Lab Results   Component Value Date    CR 1.72 12/27/2019     Lab Results   Component Value Date     12/27/2019       LAST CBC:  Lab Results   Component Value Date    WBC 9.8 12/20/2019     Lab Results   Component Value Date    RBC 4.79  12/20/2019     Lab Results   Component Value Date    HGB 15.2 12/20/2019     Lab Results   Component Value Date    HCT 46.1 12/20/2019     Lab Results   Component Value Date    MCV 96 12/20/2019     Lab Results   Component Value Date    MCH 31.7 12/20/2019     Lab Results   Component Value Date    MCHC 33.0 12/20/2019     Lab Results   Component Value Date    RDW 13.2 12/20/2019     Lab Results   Component Value Date     12/20/2019           Thank you for allowing me to participate in the care of your patient.    Sincerely,     FOELIA Mayer CNP     [facility]    cc:   [facility]  [address] [place] [address]

## 2019-12-27 NOTE — PATIENT INSTRUCTIONS
Thanks for coming into [facility] today.    Doing well post stent.  Continue on Brilinta x 12 months uninterrupted.   Continue on all other medications  Blood pressures well controlled   Start cardiac rehab  Encourage exercise, weight loss, and heart healthy diet  Follow up with Dr. Santana in 6 months or sooner if needed.       Please call my nurse at [phone] with any questions or concerns.    Scheduling phone number: [phone]  Reminder: Please bring in all current medications, over the counter supplements and vitamin bottles to your next appointment.

## 2019-12-27 NOTE — PROGRESS NOTES
Cardiology Clinic Progress Note  Huey Lucas MRN# [id number]   YOB: 1950 Age: 69 year old     Reason For Visit:  Follow up   Primary Cardiologist:   Dr. 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  He was noted to have residual disease disease in the circumflex /OM and subsequently underwent a staged PCI with successful stenting to the OM 2 using a 4.0 x 22 mm Kevin drug-eluting stent.  Follow-up echocardiogram demonstrated a low normal ejection fraction of 50 to 55%, mild inferior lateral wall hypokinesia, normal RV systolic function, and aortic valve sclerosis.  He returns to the office today for a 1 week follow-up.    He is feeling well on a cardiac standpoint, denies chest pain, shortness of breath, PND, orthopnea, presyncope, syncope, edema, heart racing, or palpitations.  His only complaint is mild right radial site tenderness.     Blood pressure is well controlled at 131/80, managed on metoprolol  BMP today showed a sodium of 134, potassium 4.4, BUN 27, creatinine 1.72, and GFR 40  Hemoglobin 15.2 and Platelet 184, no evidence of bleeding on DAPT.     Lipid panel demonstrated a total cholesterol of 146, HDL 34, LDL 54, triglycerides 289, atorvastatin was increased to 80 mg at the time of discharge.  BMI 34.04    He is scheduled to begin cardiac rehab next week  Follows a healthy diet  Remains compliant with all medications.  Intolerant to CPAP                   Assessment and Plan:       1.  Coronary artery disease-inferior STEMI in early December status  post drug-eluting stent to the RCA.  Residual disease in the circumflex/OM with a successful staged PCI (12/20/19) to the OM 2 using a 4.0 x 22 mm Kevin drug-eluting stent.  Follow-up echocardiogram demonstrated a low normal ejection fraction of 50 to 55%, mild inferior lateral wall hypokinesia, normal RV systolic function, and aortic valve sclerosis.  Continue on dual antiplatelet therapy x12 months uninterrupted, metoprolol, and statin.  Cardiac rehab, exercise, weight loss, and heart healthy diet.    2.  Hypertension -controlled on current medical therapy  3.  Hyperlipidemia -LDL at goal, 54.  Triglycerides elevated at 289, atorvastatin was increased to high-dose 80 mg at the time of discharge.  Scheduled to follow-up with a fasting lipid panel in 6 weeks.  4.  Chronic kidney disease -creatinine elevated today at 1.72 from previous 1.3, possibly related to contrast exposure.  Recommend follow-up BMP in 6 weeks  5.  Obstructive sleep apnea -intolerant to CPAP  6. Diabetes - Uncontrolled, last A1C 11.3, follows with PCP for management.                 Thank you for allowing me to participate in this delightful patient's care. I have recommended he follow up in 6 months with Dr. Santana or sooner if needed .       Kari Samuel, APRN CNP         Review of Systems:     Review of Systems:  Skin:  Negative     Eyes:  Positive for glasses  ENT:  Negative    Respiratory:  Positive for shortness of breath  Cardiovascular:    Positive for;dizziness;lightheadedness  Gastroenterology: Negative    Genitourinary:  Negative    Musculoskeletal:  Negative    Neurologic:  Positive for headaches  Psychiatric:  Negative    Heme/Lymph/Imm:  Positive for allergies  Endocrine:  Positive for diabetes              Physical Exam:     GEN:  In general, this is a obese male in no acute distress.  HEENT:  Pupils equal, round. Sclerae nonicteric. Clear oropharynx. Mucous membranes moist.  NECK: Supple, no masses appreciated. Trachea midline.  No JVD   C/V:  Regular rate and rhythm, No murmur, rub or gallop. No S3 or RV heave.   RESP: Respirations are unlabored. No use of accessory muscles. Clear to auscultation bilaterally without wheezing, rales, or rhonchi.  GI: Abdomen soft, nontender, nondistended. No HSM appreciated.   EXTREM: No LE edema. No cyanosis or clubbing.  NEURO: Alert and oriented, cooperative. No obvious focal deficits.   PSYCH: Normal affect.  SKIN: Warm and dry. No rashes or petechiae appreciated. Right radial site, ecchymotic. No evidence of hematoma or bruit         Past Medical History:     Past Medical History:   Diagnosis Date     AAA (abdominal aortic aneurysm) (H)     surgery 2011, s/p EVAR 12-30-09     Arthritis     osteoarthritis     Baker's cyst of knee      BPH (benign prostatic hyperplasia)      CAD (coronary artery disease), native coronary artery      Chronic anemia      Chronic pain syndrome      Chronic renal insufficiency      CKD (chronic kidney disease)      Complex regional pain syndrome      Cyst of pituitary gland (H)     surgery 2011     Depression      Diabetes mellitus (H)     type II, on oral medications     Erectile dysfunction      Hepatitis C, chronic (H)     history of; resolved per pt (12/07/15)     Hypertension      Hypogonadism male      Late effects of acute poliomyelitis      Polio      Polyarthritis     inflammatory     Pulmonary hypertension (H)     H/O     Pulmonary hypertension (H)      Pulmonary hypertension (H)      Pure hypercholesterolemia      Renal stone      Right ankle pain      S/P insertion of spinal cord stimulator      Sleep apnea     C PAP not using since 4/2015     ST elevation myocardial infarction (STEMI) of inferior wall (H)               Past Surgical History:     Past Surgical History:   Procedure Laterality Date     ABDOMEN SURGERY      AAA repair 2011     ARTHRODESIS ANKLE Left 4/13/2015    Procedure: ARTHRODESIS ANKLE;  Surgeon: Kim Martinez MD;  Location: [facility]      ARTHRODESIS ANKLE Left 3/4/2019    Procedure: LEFT REVISION ARTHRODESIS ANKLE  AND BUNIONECTOMY ;  Surgeon: Kim Martinez MD;  Location:  OR     ARTHRODESIS FOOT  8/18/2014    Procedure: ARTHRODESIS FOOT;  Surgeon: Kim Martinez MD;  Location: [facility]     ARTHROPLASTY KNEE  5/1/2012    Procedure:ARTHROPLASTY KNEE; LEFT TOTAL KNEE ARTHROPLASTY (DEPUY)^; Surgeon:DENISE QUINTANA; Location: OR     ARTHROPLASTY KNEE Right 1/30/2017    Procedure: ARTHROPLASTY KNEE;  Surgeon: Denise Quintana MD;  Location:  OR     ARTHROSCOPY SHOULDER ROTATOR CUFF REPAIR  7/17/2013    Procedure: ARTHROSCOPY SHOULDER ROTATOR CUFF REPAIR;  RIGHT SHOULDER ARTHROSCOPIC ROTATOR CUFF REPAIR, SUBACROMIAL DECOMPRESSION WITH ACROMIOPLASTY, BICEP TENODESIS. ;  Surgeon: Denise Quintana MD;  Location: [facility]     BACK SURGERY  6/2011    spinal cord stimulator removed     BUNIONECTOMY Right 12/7/2015    Procedure: BUNIONECTOMY;  Surgeon: Kim Martinez MD;  Location: [facility]     BUNIONECTOMY Left 3/4/2019    Procedure: Bunionectomy;  Surgeon: Kim Martinez MD;  Location:  OR     COLONOSCOPY       CV HEART CATHETERIZATION WITH POSSIBLE INTERVENTION N/A 12/5/2019    Procedure: Heart Catheterization;  Surgeon: Howard Santana MD;  Location:  HEART CARDIAC CATH LAB     CV HEART CATHETERIZATION WITH POSSIBLE INTERVENTION N/A 12/20/2019    SHIVA to OM2 successful without complications     ENT SURGERY  7 years old    tonsillectomy     ESOPHAGOGASTRODUODENOSCOPY       EXCISE CYST GENERIC (LOCATION) Right 3/7/2016    Procedure: EXCISE CYST GENERIC (LOCATION);  Surgeon: Kim Martinez MD;  Location: [facility]     GI SURGERY  in high school    drain pilonidal cyst     GRAFT BONE FROM ILIAC CREST Right 3/7/2016    Procedure: GRAFT BONE FROM ILIAC CREST;  Surgeon: Kim Martinez MD;  Location: [facility]     HEAD & NECK SURGERY  2/6/15    excision of cyst back of neck     IRRIGATION AND DEBRIDEMENT LOWER EXTREMITY,  COMBINED Right 10/9/2017    Procedure: OPEN RIGHT ANKLE MEDIAL GUTTER DEBRIDEMENT ;  Surgeon: Kim Martinez MD;  Location: [facility]     ORTHOPEDIC SURGERY      right ankle replacement     ORTHOPEDIC SURGERY  5/01    arthroscopy right knee     ORTHOPEDIC SURGERY  7/01    right total ankle with subtalar fusion     ORTHOPEDIC SURGERY  3/05, 9/10    right ankle revision x 2     ORTHOPEDIC SURGERY  8/2014    L naviculocuneiform fusion, gastrocnemius lengthening and 2nd hammer toe repair     PITUITARY SURGERY  7/2011    transsphenoidal fenestration of pituitary cyst     REMOVE HARDWARE FOOT Left 4/13/2015    Procedure: REMOVE HARDWARE FOOT;  Surgeon: Kim Martinez MD;  Location: [facility]              Allergies:   Levaquin       Data:   All laboratory data reviewed:    LAST CHOLESTEROL:  Lab Results   Component Value Date    CHOL 146 12/05/2019     Lab Results   Component Value Date    HDL 34 12/05/2019     Lab Results   Component Value Date    LDL 54 12/05/2019     Lab Results   Component Value Date    TRIG 289 12/05/2019     No results found for: CHOLHDLRATIO    LAST BMP:  Lab Results   Component Value Date     12/27/2019      Lab Results   Component Value Date    POTASSIUM 4.4 12/27/2019     Lab Results   Component Value Date    CHLORIDE 101 12/27/2019     Lab Results   Component Value Date    REZA 9.5 12/27/2019     Lab Results   Component Value Date    CO2 28 12/27/2019     Lab Results   Component Value Date    BUN 27 12/27/2019     Lab Results   Component Value Date    CR 1.72 12/27/2019     Lab Results   Component Value Date     12/27/2019       LAST CBC:  Lab Results   Component Value Date    WBC 9.8 12/20/2019     Lab Results   Component Value Date    RBC 4.79 12/20/2019     Lab Results   Component Value Date    HGB 15.2 12/20/2019     Lab Results   Component Value Date    HCT 46.1 12/20/2019     Lab Results   Component Value Date    MCV 96 12/20/2019     Lab Results   Component Value Date     MCH 31.7 12/20/2019     Lab Results   Component Value Date    MCHC 33.0 12/20/2019     Lab Results   Component Value Date    RDW 13.2 12/20/2019     Lab Results   Component Value Date     12/20/2019

## 2020-01-06 ENCOUNTER — HOSPITAL ENCOUNTER (OUTPATIENT)
Dept: CARDIAC REHAB | Facility: CLINIC | Age: 70
End: 2020-01-06
Attending: INTERNAL MEDICINE
Payer: MEDICARE

## 2020-01-06 PROCEDURE — 93798 PHYS/QHP OP CAR RHAB W/ECG: CPT

## 2020-01-06 PROCEDURE — 40000116 ZZH STATISTIC OP CR VISIT

## 2020-01-10 ENCOUNTER — TELEPHONE (OUTPATIENT)
Dept: CARDIOLOGY | Facility: CLINIC | Age: 70
End: 2020-01-10

## 2020-01-10 NOTE — TELEPHONE ENCOUNTER
Returned call from Dr. Benitez at [facility] who would like to pull infected tooth, but as pt had recent PCI he would like this to be cleared by cardiology. He plans to complete it under local anesthesia and would not hold pt's Brilinta and ASA. Per Dr Ricketts, pt is okay to proceed with this oral surgery. This was relayed to Melanie at [facility]. Kayli Jane RN

## 2020-02-14 ENCOUNTER — HOSPITAL ENCOUNTER (EMERGENCY)
Facility: CLINIC | Age: 70
Discharge: HOME | End: 2020-02-14
Attending: EMERGENCY MEDICINE | Admitting: EMERGENCY MEDICINE
Payer: MEDICARE

## 2020-02-14 ENCOUNTER — APPOINTMENT (OUTPATIENT)
Dept: GENERAL RADIOLOGY | Facility: CLINIC | Age: 70
End: 2020-02-14
Attending: EMERGENCY MEDICINE
Payer: MEDICARE

## 2020-02-14 VITALS
BODY MASS INDEX: 35.5 KG/M2 | HEIGHT: 70 IN | WEIGHT: 248 LBS | SYSTOLIC BLOOD PRESSURE: 106 MMHG | HEART RATE: 65 BPM | TEMPERATURE: 97.5 F | RESPIRATION RATE: 16 BRPM | OXYGEN SATURATION: 94 % | DIASTOLIC BLOOD PRESSURE: 68 MMHG

## 2020-02-14 DIAGNOSIS — K21.00 GASTROESOPHAGEAL REFLUX DISEASE WITH ESOPHAGITIS: ICD-10-CM

## 2020-02-14 LAB
ALBUMIN SERPL-MCNC: 3.6 G/DL (ref 3.4–5)
ALP SERPL-CCNC: 127 U/L (ref 40–150)
ALT SERPL W P-5'-P-CCNC: 38 U/L (ref 0–70)
ANION GAP SERPL CALCULATED.3IONS-SCNC: 6 MMOL/L (ref 3–14)
AST SERPL W P-5'-P-CCNC: 23 U/L (ref 0–45)
BASOPHILS # BLD AUTO: 0 10E9/L (ref 0–0.2)
BASOPHILS NFR BLD AUTO: 0.2 %
BILIRUB SERPL-MCNC: 0.3 MG/DL (ref 0.2–1.3)
BUN SERPL-MCNC: 42 MG/DL (ref 7–30)
CALCIUM SERPL-MCNC: 9.3 MG/DL (ref 8.5–10.1)
CHLORIDE SERPL-SCNC: 104 MMOL/L (ref 94–109)
CO2 SERPL-SCNC: 25 MMOL/L (ref 20–32)
CREAT SERPL-MCNC: 1.91 MG/DL (ref 0.66–1.25)
CRP SERPL-MCNC: <2.9 MG/L (ref 0–8)
DIFFERENTIAL METHOD BLD: ABNORMAL
EOSINOPHIL # BLD AUTO: 0.2 10E9/L (ref 0–0.7)
EOSINOPHIL NFR BLD AUTO: 2.1 %
ERYTHROCYTE [DISTWIDTH] IN BLOOD BY AUTOMATED COUNT: 13.9 % (ref 10–15)
GFR SERPL CREATININE-BSD FRML MDRD: 35 ML/MIN/{1.73_M2}
GLUCOSE SERPL-MCNC: 309 MG/DL (ref 70–99)
HCT VFR BLD AUTO: 42.3 % (ref 40–53)
HGB BLD-MCNC: 13.5 G/DL (ref 13.3–17.7)
IMM GRANULOCYTES # BLD: 0.1 10E9/L (ref 0–0.4)
IMM GRANULOCYTES NFR BLD: 0.7 %
INTERPRETATION ECG - MUSE: NORMAL
LIPASE SERPL-CCNC: 103 U/L (ref 73–393)
LYMPHOCYTES # BLD AUTO: 2.5 10E9/L (ref 0.8–5.3)
LYMPHOCYTES NFR BLD AUTO: 25.2 %
MCH RBC QN AUTO: 31.5 PG (ref 26.5–33)
MCHC RBC AUTO-ENTMCNC: 31.9 G/DL (ref 31.5–36.5)
MCV RBC AUTO: 99 FL (ref 78–100)
MONOCYTES # BLD AUTO: 1.1 10E9/L (ref 0–1.3)
MONOCYTES NFR BLD AUTO: 10.7 %
NEUTROPHILS # BLD AUTO: 6.1 10E9/L (ref 1.6–8.3)
NEUTROPHILS NFR BLD AUTO: 61.1 %
NRBC # BLD AUTO: 0 10*3/UL
NRBC BLD AUTO-RTO: 0 /100
PLATELET # BLD AUTO: 204 10E9/L (ref 150–450)
POTASSIUM SERPL-SCNC: 5.3 MMOL/L (ref 3.4–5.3)
PROT SERPL-MCNC: 7 G/DL (ref 6.8–8.8)
RBC # BLD AUTO: 4.28 10E12/L (ref 4.4–5.9)
SODIUM SERPL-SCNC: 135 MMOL/L (ref 133–144)
TROPONIN I SERPL-MCNC: 0.02 UG/L (ref 0–0.04)
WBC # BLD AUTO: 9.9 10E9/L (ref 4–11)

## 2020-02-14 PROCEDURE — 25000125 ZZHC RX 250: Performed by: EMERGENCY MEDICINE

## 2020-02-14 PROCEDURE — 83690 ASSAY OF LIPASE: CPT | Performed by: EMERGENCY MEDICINE

## 2020-02-14 PROCEDURE — 80053 COMPREHEN METABOLIC PANEL: CPT | Performed by: EMERGENCY MEDICINE

## 2020-02-14 PROCEDURE — 85025 COMPLETE CBC W/AUTO DIFF WBC: CPT | Performed by: EMERGENCY MEDICINE

## 2020-02-14 PROCEDURE — 25000132 ZZH RX MED GY IP 250 OP 250 PS 637: Mod: GY | Performed by: EMERGENCY MEDICINE

## 2020-02-14 PROCEDURE — 71046 X-RAY EXAM CHEST 2 VIEWS: CPT

## 2020-02-14 PROCEDURE — 84484 ASSAY OF TROPONIN QUANT: CPT | Performed by: EMERGENCY MEDICINE

## 2020-02-14 PROCEDURE — 99285 EMERGENCY DEPT VISIT HI MDM: CPT | Mod: 25

## 2020-02-14 PROCEDURE — 86140 C-REACTIVE PROTEIN: CPT | Performed by: EMERGENCY MEDICINE

## 2020-02-14 PROCEDURE — 93005 ELECTROCARDIOGRAM TRACING: CPT

## 2020-02-14 RX ADMIN — LIDOCAINE HYDROCHLORIDE 30 ML: 20 SOLUTION ORAL; TOPICAL at 20:08

## 2020-02-14 ASSESSMENT — MIFFLIN-ST. JEOR: SCORE: 1891.17

## 2020-02-14 NOTE — ED AVS SNAPSHOT
Emergency Department  [address] [place] [address]  Phone:  [phone]  Fax:  [phone]                                    Huey Lucas   MRN: [id number]    Department:   Emergency Department   Date of Visit:  2/14/2020           After Visit Summary Signature Page    I have received my discharge instructions, and my questions have been answered. I have discussed any challenges I see with this plan with the nurse or doctor.    ..........................................................................................................................................  Patient/Patient Representative Signature      ..........................................................................................................................................  Patient Representative Print Name and Relationship to Patient    ..................................................               ................................................  Date                                   Time    ..........................................................................................................................................  Reviewed by Signature/Title    ...................................................              ..............................................  Date                                               Time          22EPIC Rev 08/18

## 2020-02-15 NOTE — ED TRIAGE NOTES
Intermittent mid sternal chest pain and epigastric pain over last 3 days, became more constant since yesterday. Took 3 doses of Nitroglycerin sl with mild improvement but symptoms came back shortly after. History of stents placement in December.

## 2020-02-15 NOTE — ED PROVIDER NOTES
History     Chief Complaint:  Chest Pain    HPI   Huey Lucas is a 69 year old male with recent STEMI and placement of stents who presents with chest pain.  The patient was seen on December 5 with acute ST MI and had RCA stenting.  He returned on 12/20 and had stenting of second obtuse marginal.  His first episode of chest pain ever was at the time of the initial MI.  He is not had any chest pain until the last 2 days again when he has pain across his chest.  Interestingly the pain goes away when he lies flat and is present when he sits up.  He says it has  been constant although ranges from 3-7 on a scale 1-10.  His pain is  currently 3 on a scale 1-10.  There is no radiation of pain, shortness of breath, nausea or diaphoresis.  Denies any abdominal pain.  Denies any fever or cough.  He is taking his Brilinta and aspirin.  He denies any worsening the pain with exertion.  His cardiac risk factors include hypertension, diabetes,, hyperlipidemia, and history of coronary disease.  He denies any history of PE or DVT in himself or family.  Denies any risk factors for PE or DVT.  He took 3 nitroglycerin at home today and says the first 1 may have helped but the other 2 did not seem to help.    Allergies:  Levaquin    Medications:    Baby aspirin  Atorvastatin  Wellbutrin  Insulin  Metoprolol  Nitrostat  Viagra  Depotestosterone injection   Brilinta  Ambien    Past Medical History:    AAA  Arthritis  BPH  Chronic anemia  CAD  Chronic pain   CKD  Diabetes Type II  Depression   ED  Chronic hepatitis C  Hypogonadism  Hypertension  Polio  Polyarthritis   Pulmonary hypertension  Hyperlipidemia  Renal stone  Obstructive sleep apnea  STEMI    Past Surgical History:    AAA repair  Left ankle arthrodesis x3  Left total knee arthroplasty   Right total knee arthroplasty   Right rotator cuff repair   Spinal cord stimulator removed  Bunionectomies, bilateral   Heart catheterization x2  Tonsillectomy   EGD  Pilonidal cyst  "drained  Iliac crest bone graft  Right ankle replacement   Right knee arthroscopy   Right ankle revision x2  Left naviculocuneiform fusion     Family History:    Lymphoma  AAA    Social History:  Marital Status:   [2]  Former smoker. Quit 2002.   Negative for alcohol use.  Positive for marijuana use.   He is here with his wife Dalila.  He lives in a home.  His primary is Dr. Grullon.    Review of Systems  His blood sugars have been higher recently especially in the mornings and was 300 this morning.  He has had a dental infection recently but had that taken care of a week ago.  All her systems are negative.    Physical Exam     Patient Vitals for the past 24 hrs:   BP Temp Temp src Pulse Resp SpO2 Height Weight   02/14/20 2230 106/68 -- -- 65 -- 94 % -- --   02/14/20 2200 108/64 -- -- 69 -- 95 % -- --   02/14/20 2130 98/68 -- -- 68 -- 95 % -- --   02/14/20 2120 -- -- -- -- -- 95 % -- --   02/14/20 2100 102/66 -- -- 71 -- 95 % -- --   02/14/20 2031 92/63 -- -- 72 -- 96 % -- --   02/14/20 1933 106/50 97.5  F (36.4  C) Oral 83 16 96 % 1.778 m (5' 10\") 112.5 kg (248 lb)       Physical Exam  Constitutional:  Oriented to person, place, and time.      Appears well-developed and well-nourished.   HENT:   Head:    Normocephalic and atraumatic.   Right Ear:   Tympanic membrane and external ear normal.   Left Ear:   Tympanic membrane and external ear normal.   Mouth/Throat:   Oropharynx is clear and moist.      Mucous membranes are normal.   Eyes:    Conjunctivae normal and EOM are normal.      Pupils are equal, round, and reactive to light.   Neck:    Normal range of motion. Neck supple.   Cardiovascular:  Normal rate, regular rhythm, S1 normal and S2 normal.      No gallop and no friction rub. No murmur heard.  Pulmonary/Chest:  Breath sounds normal. No respiratory distress.      No wheezes. No rhonchi. No rales.   Abdominal:   Soft. No hepatosplenomegaly. No tenderness.      No rebound and no CVA tenderness. "   Musculoskeletal:  Normal range of motion.   Neurological:   Alert and oriented to person, place, and time. Normal strength.      GCS eye subscore is 4. GCS verbal subscore is 5.      GCS motor subscore is 6.   Skin:    Skin is warm and dry.   Psychiatric:   Normal mood and affect.      Speech is normal and behavior is normal.      Judgment and thought content normal.      Cognition and memory are normal.       Emergency Department Course   ECG:  Indication: Chest pain   Time: 1938  Vent. Rate 79 bpm. KS interval 150. QRS duration 118. QT/QTc 374/428. P-R-T axis 51 -42 17.  Normal sinus rhythm. Left axis deviation. Left ventricular hypertrophy with QRS widening. Possible inferior infarct, age undetermined. Abnormal ECG. No significant change compared to EKG dated 12/20/19. Read time: 1942    Imaging:  Radiographic findings were communicated with the patient who voiced understanding of the findings.    XR Chest 2 views:   No radiographic evidence of acute chest abnormality, as per radiology.     Laboratory:  CBC: WBC: 9.9, HGB: 13.5, PLT: 204  CMP: Glucose 309 (H), BUN: 42 (H), Creatinine: 1.91 (H), GFR: 35 (L), o/w WNL   2000 Troponin: 0.018   CRP: <2.9  Lipase: 103    Procedures:  None     Interventions:  2008 GI Cocktail (Maalox/Mylanta and viscous Lidocaine), 30 mL suspension, PO     Emergency Department Course:  Nursing notes and vitals reviewed.   I performed an exam of the patient as documented above.      IV was inserted and blood was drawn for laboratory testing, results above. Medicine administered as documented above.   EKG obtained in the ED, see results above.      The patient was sent for a chest XR while in the emergency department, results above.       I rechecked the patient and discussed the results of his workup thus far.       Impression & Plan      Medical Decision Making:  The patient is a 69year-old male with a cardiac history with recent STEMI and placement of 2 stents who comes with 2 days  of constant chest pain.  Interestingly he has no pain lying down and is present only sitting up or standing.  He has not had any time without chest pain.  His laboratory work was unremarkable other than blood sugar being high which he says has been and he can follow-up with his primary for that.  He did have complete relief of all his symptoms with a GI cocktail.  He notes that he sometimes has some reflux.  His troponin is negligible and I do not think this represents cardiac pain in light of having symptoms for 2 days and no significant troponin elevation.  I will discharge him on Prilosec and have him take Maalox 4 times a day.  He should follow-up with his primary in a few days.  He obviously needs to further address the elevated blood sugars with his primary doctor.  Due to the complete relief with the GI cocktail believe this represents GERD with probable esophagitis.  However if he has any worsening pain he should return to the ED.  Critical Care time:  none    Diagnosis:    ICD-10-CM    1. Gastroesophageal reflux disease with esophagitis K21.0        Disposition:Discharged to home.     Discharge Medications:  Discharge Medication List as of 2/14/2020 10:36 PM      START taking these medications    Details   omeprazole (PRILOSEC) 20 MG DR capsule Take 1 capsule (20 mg) by mouth daily, Disp-30 capsule, R-0, Local Print           Scribe Disclosure:  I, Jeanne Moore, am serving as a scribe on 2/14/2020 at 11:38 PM to personally document services performed by Bindu Gupta based on my observations and the provider's statements to me.     2/14/2020    EMERGENCY DEPARTMENT       Bindu Gupta MD  02/14/20 [id number]

## 2020-02-15 NOTE — DISCHARGE INSTRUCTIONS
Start the Prilosec and take Maalox 4 times a day as needed.  Avoid any anti-inflammatories  Discharge Instructions  Chest Pain    You have been seen today for chest pain or discomfort.  At this time, your provider has found no signs that your chest pain is due to a serious or life-threatening condition, (or you have declined more testing and/or admission to the hospital). However, sometimes there is a serious problem that does not show up right away. Your evaluation today may not be complete and you may need further testing and evaluation.     Generally, every Emergency Department visit should have a follow-up clinic visit with either a primary or a specialty clinic/provider. Please follow-up as instructed by your emergency provider today.  Return to the Emergency Department if:  Your chest pain changes, gets worse, starts to happen more often, or comes with less activity.  You are newly short of breath.  You get very weak or tired.  You pass out or faint.  You have any new symptoms, like fever, cough, numb legs, or you cough up blood.  You have anything else that worries you.    Until you follow-up with your regular provider, please do the following:  Take one aspirin daily unless you have an allergy or are told not to by your provider.  If a stress test appointment has been made, go to the appointment.  If you have questions, contact your regular provider.  Follow-up with your regular provider/clinic as directed; this is very important.    If you were given a prescription for medicine here today, be sure to read all of the information (including the package insert) that comes with your prescription.  This will include important information about the medicine, its side effects, and any warnings that you need to know about.  The pharmacist who fills the prescription can provide more information and answer questions you may have about the medicine.  If you have questions or concerns that the pharmacist cannot  address, please call or return to the Emergency Department.       Remember that you can always come back to the Emergency Department if you are not able to see your regular provider in the amount of time listed above, if you get any new symptoms, or if there is anything that worries you.

## 2020-12-11 NOTE — CONSULTS
NUTRITION EDUCATION    REASON FOR ASSESSMENT:  Nutrition education on American Heart Association (AHA) Heart Healthy Diet.    NUTRITION HISTORY:  Information obtained from [facility]  Pt is familiar with a heart healthy diet and reads labels (because of his diabetes)  Pt states that his appetite has been good.  Pt's wife does the cooking and pt is the one that does the grocery shopping.  Pt and his wife go out to eat about 3-4x a week    Diet Recall:  Breakfast-   [place] omelette at [employer] store  Snacks-  Sargento cheese and nuts packets  Pears  Dinner-  Pasta w/ red sauce  Hamburger with no bun    Dining out:   Hamburger with no bun  Cob salad  Pizza   Ice cream (pumpkin flavor)    Likes to eat out at [place], [facility], [employer], and [employer]    Pt reports that he knows what he should be doing in terms of his diet, but he doesn't always follow it.    CURRENT DIET ORDER:  High CCD & Low saturated fat Na < 2400 mg diet    NUTRITION DIAGNOSIS:  Limited adherence to nutrition related recommendations R/t  Heart Healthy Diet AEB diet recall being high in sodium and pt report of not always following recommendations.    INTERVENTIONS:  Nutrition Prescription:    Recommended AHA Heart Healthy Diet    Implementation:     Nutrition Education (Content):  a) reviewed Heart Healthy Diet guidelines  b) provided heart healthy diet handout    Nutrition Education (Application):  a) Discussed current eating habits and recommended alternative food choices    Anticipate good compliance    Diet Education - refer to Education flowsheet    Goals:    Patient verbalizes understanding of diet    All of the above goals met during education session    Follow Up/Monitoring:    Provided RD contact information for future questions    Ghislaine Hastings  Dietetic Intern     endometrial and endocervical curettings

## 2020-12-30 DIAGNOSIS — E78.5 HYPERLIPIDEMIA LDL GOAL <70: ICD-10-CM

## 2020-12-30 DIAGNOSIS — I25.10 CORONARY ARTERY DISEASE INVOLVING NATIVE CORONARY ARTERY OF NATIVE HEART WITHOUT ANGINA PECTORIS: ICD-10-CM

## 2020-12-30 LAB
ANION GAP SERPL CALCULATED.3IONS-SCNC: 3 MMOL/L (ref 3–14)
BUN SERPL-MCNC: 26 MG/DL (ref 7–30)
CALCIUM SERPL-MCNC: 9.4 MG/DL (ref 8.5–10.1)
CHLORIDE SERPL-SCNC: 105 MMOL/L (ref 94–109)
CHOLEST SERPL-MCNC: 112 MG/DL
CO2 SERPL-SCNC: 25 MMOL/L (ref 20–32)
CREAT SERPL-MCNC: 1.48 MG/DL (ref 0.66–1.25)
GFR SERPL CREATININE-BSD FRML MDRD: 47 ML/MIN/{1.73_M2}
GLUCOSE SERPL-MCNC: 175 MG/DL (ref 70–99)
HDLC SERPL-MCNC: 39 MG/DL
LDLC SERPL CALC-MCNC: 53 MG/DL
NONHDLC SERPL-MCNC: 73 MG/DL
POTASSIUM SERPL-SCNC: 4.8 MMOL/L (ref 3.4–5.3)
SODIUM SERPL-SCNC: 133 MMOL/L (ref 133–144)
TRIGL SERPL-MCNC: 99 MG/DL

## 2020-12-30 PROCEDURE — 80061 LIPID PANEL: CPT | Performed by: NURSE PRACTITIONER

## 2020-12-30 PROCEDURE — 36415 COLL VENOUS BLD VENIPUNCTURE: CPT | Performed by: NURSE PRACTITIONER

## 2020-12-30 PROCEDURE — 80048 BASIC METABOLIC PNL TOTAL CA: CPT | Performed by: NURSE PRACTITIONER

## 2021-01-12 ENCOUNTER — OFFICE VISIT (OUTPATIENT)
Dept: CARDIOLOGY | Facility: CLINIC | Age: 71
End: 2021-01-12
Payer: MEDICARE

## 2021-01-12 VITALS
DIASTOLIC BLOOD PRESSURE: 80 MMHG | BODY MASS INDEX: 33.15 KG/M2 | HEART RATE: 90 BPM | OXYGEN SATURATION: 96 % | WEIGHT: 231 LBS | SYSTOLIC BLOOD PRESSURE: 122 MMHG

## 2021-01-12 DIAGNOSIS — I71.40 ABDOMINAL AORTIC ANEURYSM (AAA) WITHOUT RUPTURE (H): ICD-10-CM

## 2021-01-12 DIAGNOSIS — I25.10 CORONARY ARTERY DISEASE INVOLVING NATIVE CORONARY ARTERY OF NATIVE HEART WITHOUT ANGINA PECTORIS: Primary | ICD-10-CM

## 2021-01-12 DIAGNOSIS — E78.5 HYPERLIPIDEMIA LDL GOAL <70: ICD-10-CM

## 2021-01-12 DIAGNOSIS — G47.33 OSA (OBSTRUCTIVE SLEEP APNEA): ICD-10-CM

## 2021-01-12 DIAGNOSIS — I10 BENIGN ESSENTIAL HYPERTENSION: ICD-10-CM

## 2021-01-12 PROCEDURE — 99213 OFFICE O/P EST LOW 20 MIN: CPT | Performed by: INTERNAL MEDICINE

## 2021-01-12 RX ORDER — INSULIN GLARGINE 100 [IU]/ML
50 INJECTION, SOLUTION SUBCUTANEOUS DAILY
COMMUNITY
Start: 2020-11-18

## 2021-01-12 RX ORDER — SEMAGLUTIDE 1.34 MG/ML
0.5 INJECTION, SOLUTION SUBCUTANEOUS
COMMUNITY
Start: 2020-03-30

## 2021-01-12 NOTE — PROGRESS NOTES
"HPI and Plan:     Huey Kirkland is a very pleasant 70-year-old with history of polio coronary disease previous coronary stenting hyperlipidemia hypertension history of abdominal aortic aneurysm and obstructive sleep apnea.  He denies any chest pain chest pressure heart racing palpitations.  He does have some dyspnea with going up stairs rapidly but says he has had this for years and is no change.  He is lost 20 pounds over the past year he says he gets a \"a \"knot in his stomach after eating and he has plans to see his gastroenterologist.    He is also diabetic and on insulin.  He has not had check on his abdominal aortic aneurysm in many years.  Last I can see was from .  He believes his surgery was perhaps 20 years ago but we have no information on that as it was it was from [facility].    Assessment: Appears stable from a cardiovascular standpoint.  Blood pressure weight heart rate are controlled.  Physical exam is unremarkable.  I have encouraged him to definitely see his gastroenterologist for his weight loss and \"a \"knot in his stomach.  He is due for a abdominal aortic aneurysm which I would like to get at least on an every 3 to 5-year basis.  He will also be due for stress testing next year due to his polio is unable to walk and therefore would required adenosine Lexiscan stress test particular given his diabetes and high risk category.    Orders Placed This Encounter   Procedures     Follow-Up with Cardiologist     Orders Placed This Encounter   Medications     insulin glargine (BASAGLAR KWIKPEN) 100 UNIT/ML pen     Sig: Inject 50 Units Subcutaneous daily     If Basaglar is not covered by insurance, may substitute Lantus at same dose and frequency.       Semaglutide,0.25 or 0.5MG/DOS, (OZEMPIC, 0.25 OR 0.5 MG/DOSE,) 2 MG/1.5ML SOPN     Si.5 mg every 7 days     There are no discontinued medications.      Encounter Diagnoses   Name Primary?     Coronary artery disease involving native coronary artery of " native heart without angina pectoris Yes     Hyperlipidemia LDL goal <70      Benign essential hypertension      FREDI (obstructive sleep apnea)        CURRENT MEDICATIONS:  Current Outpatient Medications   Medication Sig Dispense Refill     aspirin (ASA) 81 MG EC tablet Take 1 tablet (81 mg) by mouth daily 30 tablet 1     atorvastatin (LIPITOR) 80 MG tablet Take 1 tablet (80 mg) by mouth daily 30 tablet 1     BuPROPion HCl (WELLBUTRIN XL PO) Take 150 mg by mouth daily       insulin aspart (NOVOLOG FLEXPEN) 100 UNIT/ML injection Inject 20 Units Subcutaneous 3 times daily (with meals) Base- 20 Units per meal  Add 3 units for every 50 over 150 Blood Glucose reading  If Blood Glucose is :  151-199 = 20 units + 3 = 23 Units  200-249 = 20 Units + 6 = 26 Units  250-299 = 20 units + 9 = 29 Units       insulin glargine (BASAGLAR KWIKPEN) 100 UNIT/ML pen Inject 50 Units Subcutaneous daily       metoprolol succinate ER (TOPROL-XL) 50 MG 24 hr tablet Take 1 tablet (50 mg) by mouth daily 30 tablet 0     nitroGLYcerin (NITROSTAT) 0.4 MG sublingual tablet For chest pain place 1 tablet under the tongue every 5 minutes for 3 doses. If symptoms persist 5 minutes after 2 nd dose call 911. 30 tablet 1     Semaglutide,0.25 or 0.5MG/DOS, (OZEMPIC, 0.25 OR 0.5 MG/DOSE,) 2 MG/1.5ML SOPN 0.5 mg every 7 days       sildenafil (VIAGRA) 100 MG tablet Take 100 mg by mouth daily as needed       testosterone cypionate (DEPOTESTOSTERONE) 100 MG/ML injection Inject 150 mg into the muscle every 14 days       ticagrelor (BRILINTA) 90 MG tablet Take 1 tablet (90 mg) by mouth every 12 hours 60 tablet 1     zolpidem (AMBIEN) 10 MG tablet Take 10 mg by mouth nightly as needed for sleep       insulin detemir (LEVEMIR VIAL) 100 UNIT/ML vial Inject 40 Units Subcutaneous every morning (before breakfast)         ALLERGIES     Allergies   Allergen Reactions     Levaquin Other (See Comments)     Swelling in knees and pain in chest, muscle aches.  Was hospitalized  at [facility] 12/09.       PAST MEDICAL HISTORY:  Past Medical History:   Diagnosis Date     AAA (abdominal aortic aneurysm) (H)     surgery 2011, s/p EVAR 12-30-09     Arthritis     osteoarthritis     Baker's cyst of knee      BPH (benign prostatic hyperplasia)      CAD (coronary artery disease), native coronary artery      Chronic anemia      Chronic pain syndrome      Chronic renal insufficiency      CKD (chronic kidney disease)      Complex regional pain syndrome      Cyst of pituitary gland (H)     surgery 2011     Depression      Diabetes mellitus (H)     type II, on oral medications     Erectile dysfunction      Hepatitis C, chronic (H)     history of; resolved per pt (12/07/15)     Hypertension      Hypogonadism male      Late effects of acute poliomyelitis      Polio      Polyarthritis     inflammatory     Pulmonary hypertension (H)     H/O     Pulmonary hypertension (H)      Pulmonary hypertension (H)      Pure hypercholesterolemia      Renal stone      Right ankle pain      S/P insertion of spinal cord stimulator      Sleep apnea     C PAP not using since 4/2015     ST elevation myocardial infarction (STEMI) of inferior wall (H)        PAST SURGICAL HISTORY:  Past Surgical History:   Procedure Laterality Date     ABDOMEN SURGERY      AAA repair 2011     ARTHRODESIS ANKLE Left 4/13/2015    Procedure: ARTHRODESIS ANKLE;  Surgeon: Kim Martinez MD;  Location: [facility]     ARTHRODESIS ANKLE Left 3/4/2019    Procedure: LEFT REVISION ARTHRODESIS ANKLE  AND BUNIONECTOMY ;  Surgeon: Kim Martinez MD;  Location:  OR     ARTHRODESIS FOOT  8/18/2014    Procedure: ARTHRODESIS FOOT;  Surgeon: Kim Martinez MD;  Location: [facility]     ARTHROPLASTY KNEE  5/1/2012    Procedure:ARTHROPLASTY KNEE; LEFT TOTAL KNEE ARTHROPLASTY (DEPUY)^; Surgeon:JOSIAS QUINTANA; Location: OR     ARTHROPLASTY KNEE Right 1/30/2017    Procedure: ARTHROPLASTY KNEE;  Surgeon: Josias Quintana MD;  Location:  OR     ARTHROSCOPY  SHOULDER ROTATOR CUFF REPAIR  7/17/2013    Procedure: ARTHROSCOPY SHOULDER ROTATOR CUFF REPAIR;  RIGHT SHOULDER ARTHROSCOPIC ROTATOR CUFF REPAIR, SUBACROMIAL DECOMPRESSION WITH ACROMIOPLASTY, BICEP TENODESIS. ;  Surgeon: Josias Quintana MD;  Location: [facility]     BACK SURGERY  6/2011    spinal cord stimulator removed     BUNIONECTOMY Right 12/7/2015    Procedure: BUNIONECTOMY;  Surgeon: Kim Martinez MD;  Location: [facility]     BUNIONECTOMY Left 3/4/2019    Procedure: Bunionectomy;  Surgeon: Kim Martinez MD;  Location:  OR     COLONOSCOPY       CV HEART CATHETERIZATION WITH POSSIBLE INTERVENTION N/A 12/5/2019    Procedure: Heart Catheterization;  Surgeon: Howard Santana MD;  Location:  HEART CARDIAC CATH LAB     CV HEART CATHETERIZATION WITH POSSIBLE INTERVENTION N/A 12/20/2019    SHIVA to OM2 successful without complications     ENT SURGERY  7 years old    tonsillectomy     ESOPHAGOGASTRODUODENOSCOPY       EXCISE CYST GENERIC (LOCATION) Right 3/7/2016    Procedure: EXCISE CYST GENERIC (LOCATION);  Surgeon: Kim Martinez MD;  Location: [facility]     GI SURGERY  in high school    drain pilonidal cyst     GRAFT BONE FROM ILIAC CREST Right 3/7/2016    Procedure: GRAFT BONE FROM ILIAC CREST;  Surgeon: Kim Martinez MD;  Location: [facility]     HEAD & NECK SURGERY  2/6/15    excision of cyst back of neck     IRRIGATION AND DEBRIDEMENT LOWER EXTREMITY, COMBINED Right 10/9/2017    Procedure: OPEN RIGHT ANKLE MEDIAL GUTTER DEBRIDEMENT ;  Surgeon: Kim Martinez MD;  Location: [facility]     ORTHOPEDIC SURGERY      right ankle replacement     ORTHOPEDIC SURGERY  5/01    arthroscopy right knee     ORTHOPEDIC SURGERY  7/01    right total ankle with subtalar fusion     ORTHOPEDIC SURGERY  3/05, 9/10    right ankle revision x 2     ORTHOPEDIC SURGERY  8/2014    L naviculocuneiform fusion, gastrocnemius lengthening and 2nd hammer toe repair     PITUITARY SURGERY  7/2011    transsphenoidal  fenestration of pituitary cyst     REMOVE HARDWARE FOOT Left 2015    Procedure: REMOVE HARDWARE FOOT;  Surgeon: Kim Martinez MD;  Location: [facility]       FAMILY HISTORY:  Family History   Problem Relation Age of Onset     Lymphoma Mother      Abdominal Aortic Aneurysm Father          of AAA     Alcoholism Paternal Grandfather        SOCIAL HISTORY:  Social History     Socioeconomic History     Marital status:      Spouse name: None     Number of children: None     Years of education: None     Highest education level: None   Occupational History     None   Social Needs     Financial resource strain: None     Food insecurity     Worry: None     Inability: None     Transportation needs     Medical: None     Non-medical: None   Tobacco Use     Smoking status: Former Smoker     Packs/day: 1.50     Years: 20.00     Pack years: 30.00     Types: Cigarettes, Cigars     Quit date: 2000     Years since quittin.0     Smokeless tobacco: Never Used     Tobacco comment: quit    Substance and Sexual Activity     Alcohol use: No     Drug use: Yes     Frequency: 1.0 times per week     Types: Marijuana     Sexual activity: None   Lifestyle     Physical activity     Days per week: None     Minutes per session: None     Stress: None   Relationships     Social connections     Talks on phone: None     Gets together: None     Attends Taoist service: None     Active member of club or organization: None     Attends meetings of clubs or organizations: None     Relationship status: None     Intimate partner violence     Fear of current or ex partner: None     Emotionally abused: None     Physically abused: None     Forced sexual activity: None   Other Topics Concern     Parent/sibling w/ CABG, MI or angioplasty before 65F 55M? Not Asked   Social History Narrative     None       Review of Systems:  Skin:  Negative     Eyes:  Positive for glasses  ENT:  Negative    Respiratory:  Positive for dyspnea on  exertion  Cardiovascular:    Positive for;dizziness;lightheadedness  Gastroenterology: Negative    Genitourinary:  Negative    Musculoskeletal:  Negative    Neurologic:  Positive for headaches  Psychiatric:  Negative    Heme/Lymph/Imm:  Positive for allergies  Endocrine:  Positive for diabetes    Physical Exam:  Vitals: /80   Pulse 90   Wt 104.8 kg (231 lb)   SpO2 96%   BMI 33.15 kg/m      Constitutional:  cooperative obese      Skin:  warm and dry to the touch          Head:  normocephalic        Eyes:  pupils equal and round        Lymph:      ENT:  no pallor or cyanosis        Neck:  JVP normal        Respiratory:  clear to auscultation         Cardiac: regular rhythm;normal S1 and S2                                                         GI:    obese      Extremities and Muscular Skeletal:  no edema              Neurological:  affect appropriate        Psych:  Alert and Oriented x 3      Recent Lab Results:  LIPID RESULTS:  Lab Results   Component Value Date    CHOL 112 12/30/2020    HDL 39 (L) 12/30/2020    LDL 53 12/30/2020    TRIG 99 12/30/2020       LIVER ENZYME RESULTS:  Lab Results   Component Value Date    AST 23 02/14/2020    ALT 38 02/14/2020       CBC RESULTS:  Lab Results   Component Value Date    WBC 9.9 02/14/2020    RBC 4.28 (L) 02/14/2020    HGB 13.5 02/14/2020    HCT 42.3 02/14/2020    MCV 99 02/14/2020    MCH 31.5 02/14/2020    MCHC 31.9 02/14/2020    RDW 13.9 02/14/2020     02/14/2020       BMP RESULTS:  Lab Results   Component Value Date     12/30/2020    POTASSIUM 4.8 12/30/2020    CHLORIDE 105 12/30/2020    CO2 25 12/30/2020    ANIONGAP 3 12/30/2020     (H) 12/30/2020    BUN 26 12/30/2020    CR 1.48 (H) 12/30/2020    GFRESTIMATED 47 (L) 12/30/2020    GFRESTBLACK 54 (L) 12/30/2020    REZA 9.4 12/30/2020        A1C RESULTS:  Lab Results   Component Value Date    A1C 11.3 (H) 12/05/2019       INR RESULTS:  Lab Results   Component Value Date    INR 1.01 12/20/2019     INR 1.03 12/05/2019           CC  No referring provider defined for this encounter.

## 2021-01-12 NOTE — LETTER
"1/12/2021    Tres Grullon MD  [address]  [facility] [address]    RE: Huey Lucas       Dear Colleague,    I had the pleasure of seeing Huey Lucas in the [facility].    HPI and Plan:     Huey Kirkland is a very pleasant 70-year-old with history of polio coronary disease previous coronary stenting hyperlipidemia hypertension history of abdominal aortic aneurysm and obstructive sleep apnea.  He denies any chest pain chest pressure heart racing palpitations.  He does have some dyspnea with going up stairs rapidly but says he has had this for years and is no change.  He is lost 20 pounds over the past year he says he gets a \"a \"knot in his stomach after eating and he has plans to see his gastroenterologist.    He is also diabetic and on insulin.  He has not had check on his abdominal aortic aneurysm in many years.  Last I can see was from 2009.  He believes his surgery was perhaps 20 years ago but we have no information on that as it was it was from [facility].    Assessment: Appears stable from a cardiovascular standpoint.  Blood pressure weight heart rate are controlled.  Physical exam is unremarkable.  I have encouraged him to definitely see his gastroenterologist for his weight loss and \"a \"knot in his stomach.  He is due for a abdominal aortic aneurysm which I would like to get at least on an every 3 to 5-year basis.  He will also be due for stress testing next year due to his polio is unable to walk and therefore would required adenosine Lexiscan stress test particular given his diabetes and high risk category.    Orders Placed This Encounter   Procedures     Follow-Up with Cardiologist     Orders Placed This Encounter   Medications     insulin glargine (BASAGLAR KWIKPEN) 100 UNIT/ML pen     Sig: Inject 50 Units Subcutaneous daily     If Basaglar is not covered by insurance, may substitute Lantus at same dose and frequency.       Semaglutide,0.25 or 0.5MG/DOS, " (OZEMPIC, 0.25 OR 0.5 MG/DOSE,) 2 MG/1.5ML SOPN     Si.5 mg every 7 days     There are no discontinued medications.    Encounter Diagnoses   Name Primary?     Coronary artery disease involving native coronary artery of native heart without angina pectoris Yes     Hyperlipidemia LDL goal <70      Benign essential hypertension      FREDI (obstructive sleep apnea)        CURRENT MEDICATIONS:  Current Outpatient Medications   Medication Sig Dispense Refill     aspirin (ASA) 81 MG EC tablet Take 1 tablet (81 mg) by mouth daily 30 tablet 1     atorvastatin (LIPITOR) 80 MG tablet Take 1 tablet (80 mg) by mouth daily 30 tablet 1     BuPROPion HCl (WELLBUTRIN XL PO) Take 150 mg by mouth daily       insulin aspart (NOVOLOG FLEXPEN) 100 UNIT/ML injection Inject 20 Units Subcutaneous 3 times daily (with meals) Base- 20 Units per meal  Add 3 units for every 50 over 150 Blood Glucose reading  If Blood Glucose is :  151-199 = 20 units + 3 = 23 Units  200-249 = 20 Units + 6 = 26 Units  250-299 = 20 units + 9 = 29 Units       insulin glargine (BASAGLAR KWIKPEN) 100 UNIT/ML pen Inject 50 Units Subcutaneous daily       metoprolol succinate ER (TOPROL-XL) 50 MG 24 hr tablet Take 1 tablet (50 mg) by mouth daily 30 tablet 0     nitroGLYcerin (NITROSTAT) 0.4 MG sublingual tablet For chest pain place 1 tablet under the tongue every 5 minutes for 3 doses. If symptoms persist 5 minutes after 2 nd dose call 911. 30 tablet 1     Semaglutide,0.25 or 0.5MG/DOS, (OZEMPIC, 0.25 OR 0.5 MG/DOSE,) 2 MG/1.5ML SOPN 0.5 mg every 7 days       sildenafil (VIAGRA) 100 MG tablet Take 100 mg by mouth daily as needed       testosterone cypionate (DEPOTESTOSTERONE) 100 MG/ML injection Inject 150 mg into the muscle every 14 days       ticagrelor (BRILINTA) 90 MG tablet Take 1 tablet (90 mg) by mouth every 12 hours 60 tablet 1     zolpidem (AMBIEN) 10 MG tablet Take 10 mg by mouth nightly as needed for sleep       insulin detemir (LEVEMIR VIAL) 100 UNIT/ML  vial Inject 40 Units Subcutaneous every morning (before breakfast)         ALLERGIES     Allergies   Allergen Reactions     Levaquin Other (See Comments)     Swelling in knees and pain in chest, muscle aches.  Was hospitalized at [facility] 12/09.       PAST MEDICAL HISTORY:  Past Medical History:   Diagnosis Date     AAA (abdominal aortic aneurysm) (H)     surgery 2011, s/p EVAR 12-30-09     Arthritis     osteoarthritis     Baker's cyst of knee      BPH (benign prostatic hyperplasia)      CAD (coronary artery disease), native coronary artery      Chronic anemia      Chronic pain syndrome      Chronic renal insufficiency      CKD (chronic kidney disease)      Complex regional pain syndrome      Cyst of pituitary gland (H)     surgery 2011     Depression      Diabetes mellitus (H)     type II, on oral medications     Erectile dysfunction      Hepatitis C, chronic (H)     history of; resolved per pt (12/07/15)     Hypertension      Hypogonadism male      Late effects of acute poliomyelitis      Polio      Polyarthritis     inflammatory     Pulmonary hypertension (H)     H/O     Pulmonary hypertension (H)      Pulmonary hypertension (H)      Pure hypercholesterolemia      Renal stone      Right ankle pain      S/P insertion of spinal cord stimulator      Sleep apnea     C PAP not using since 4/2015     ST elevation myocardial infarction (STEMI) of inferior wall (H)        PAST SURGICAL HISTORY:  Past Surgical History:   Procedure Laterality Date     ABDOMEN SURGERY      AAA repair 2011     ARTHRODESIS ANKLE Left 4/13/2015    Procedure: ARTHRODESIS ANKLE;  Surgeon: Kim Martinez MD;  Location:  [facility]     ARTHRODESIS ANKLE Left 3/4/2019    Procedure: LEFT REVISION ARTHRODESIS ANKLE  AND BUNIONECTOMY ;  Surgeon: Kim Martinez MD;  Location:  OR     ARTHRODESIS FOOT  8/18/2014    Procedure: ARTHRODESIS FOOT;  Surgeon: Kim Martinez MD;  [facility]:  [facility]     ARTHROPLASTY KNEE  5/1/2012     Procedure:ARTHROPLASTY KNEE; LEFT TOTAL KNEE ARTHROPLASTY (DEPUY)^; Surgeon:DENISE QUINTANA; Location: OR     ARTHROPLASTY KNEE Right 1/30/2017    Procedure: ARTHROPLASTY KNEE;  Surgeon: Denise Quintana MD;  Location:  OR     ARTHROSCOPY SHOULDER ROTATOR CUFF REPAIR  7/17/2013    Procedure: ARTHROSCOPY SHOULDER ROTATOR CUFF REPAIR;  RIGHT SHOULDER ARTHROSCOPIC ROTATOR CUFF REPAIR, SUBACROMIAL DECOMPRESSION WITH ACROMIOPLASTY, BICEP TENODESIS. ;  Surgeon: Denise Quintana MD;  Location: [facility]     BACK SURGERY  6/2011    spinal cord stimulator removed     BUNIONECTOMY Right 12/7/2015    Procedure: BUNIONECTOMY;  Surgeon: Kim Martinez MD;  Location: [facility]     BUNIONECTOMY Left 3/4/2019    Procedure: Bunionectomy;  Surgeon: Kim Martinez MD;  Location:  OR     COLONOSCOPY       CV HEART CATHETERIZATION WITH POSSIBLE INTERVENTION N/A 12/5/2019    Procedure: Heart Catheterization;  Surgeon: Howard Santana MD;  Location:  HEART CARDIAC CATH LAB     CV HEART CATHETERIZATION WITH POSSIBLE INTERVENTION N/A 12/20/2019    SHIVA to OM2 successful without complications     ENT SURGERY  7 years old    tonsillectomy     ESOPHAGOGASTRODUODENOSCOPY       EXCISE CYST GENERIC (LOCATION) Right 3/7/2016    Procedure: EXCISE CYST GENERIC (LOCATION);  Surgeon: Kim Martinez MD;  Location: [facility]     GI SURGERY  in high school    drain pilonidal cyst     GRAFT BONE FROM ILIAC CREST Right 3/7/2016    Procedure: GRAFT BONE FROM ILIAC CREST;  Surgeon: Kim Martinez MD;  Location: [facility]     HEAD & NECK SURGERY  2/6/15    excision of cyst back of neck     IRRIGATION AND DEBRIDEMENT LOWER EXTREMITY, COMBINED Right 10/9/2017    Procedure: OPEN RIGHT ANKLE MEDIAL GUTTER DEBRIDEMENT ;  Surgeon: Kim Martinez MD;  Location: [facility]     ORTHOPEDIC SURGERY      right ankle replacement     ORTHOPEDIC SURGERY  5/01    arthroscopy right knee     ORTHOPEDIC SURGERY  7/01    right total ankle with  subtalar fusion     ORTHOPEDIC SURGERY  3/05, 9/10    right ankle revision x 2     ORTHOPEDIC SURGERY  2014    L naviculocuneiform fusion, gastrocnemius lengthening and 2nd hammer toe repair     PITUITARY SURGERY  2011    transsphenoidal fenestration of pituitary cyst     REMOVE HARDWARE FOOT Left 2015    Procedure: REMOVE HARDWARE FOOT;  Surgeon: Kim Martinez MD;  Location: [facility]       FAMILY HISTORY:  Family History   Problem Relation Age of Onset     Lymphoma Mother      Abdominal Aortic Aneurysm Father          of AAA     Alcoholism Paternal Grandfather        SOCIAL HISTORY:  Social History     Socioeconomic History     Marital status:      Spouse name: None     Number of children: None     Years of education: None     Highest education level: None   Occupational History     None   Social Needs     Financial resource strain: None     Food insecurity     Worry: None     Inability: None     Transportation needs     Medical: None     Non-medical: None   Tobacco Use     Smoking status: Former Smoker     Packs/day: 1.50     Years: 20.00     Pack years: 30.00     Types: Cigarettes, Cigars     Quit date: 2000     Years since quittin.0     Smokeless tobacco: Never Used     Tobacco comment: quit    Substance and Sexual Activity     Alcohol use: No     Drug use: Yes     Frequency: 1.0 times per week     Types: Marijuana     Sexual activity: None   Lifestyle     Physical activity     Days per week: None     Minutes per session: None     Stress: None   Relationships     Social connections     Talks on phone: None     Gets together: None     Attends Jew service: None     Active member of club or organization: None     Attends meetings of clubs or organizations: None     Relationship status: None     Intimate partner violence     Fear of current or ex partner: None     Emotionally abused: None     Physically abused: None     Forced sexual activity: None   Other Topics Concern      Parent/sibling w/ CABG, MI or angioplasty before 65F 55M? Not Asked   Social History Narrative     None       Review of Systems:  Skin:  Negative     Eyes:  Positive for glasses  ENT:  Negative    Respiratory:  Positive for dyspnea on exertion  Cardiovascular:    Positive for;dizziness;lightheadedness  Gastroenterology: Negative    Genitourinary:  Negative    Musculoskeletal:  Negative    Neurologic:  Positive for headaches  Psychiatric:  Negative    Heme/Lymph/Imm:  Positive for allergies  Endocrine:  Positive for diabetes    Physical Exam:  Vitals: /80   Pulse 90   Wt 104.8 kg (231 lb)   SpO2 96%   BMI 33.15 kg/m      Constitutional:  cooperative obese      Skin:  warm and dry to the touch          Head:  normocephalic        Eyes:  pupils equal and round        Lymph:      ENT:  no pallor or cyanosis        Neck:  JVP normal        Respiratory:  clear to auscultation         Cardiac: regular rhythm;normal S1 and S2                                                         GI:    obese      Extremities and Muscular Skeletal:  no edema              Neurological:  affect appropriate        Psych:  Alert and Oriented x 3      Recent Lab Results:  LIPID RESULTS:  Lab Results   Component Value Date    CHOL 112 12/30/2020    HDL 39 (L) 12/30/2020    LDL 53 12/30/2020    TRIG 99 12/30/2020       LIVER ENZYME RESULTS:  Lab Results   Component Value Date    AST 23 02/14/2020    ALT 38 02/14/2020     CBC RESULTS:  Lab Results   Component Value Date    WBC 9.9 02/14/2020    RBC 4.28 (L) 02/14/2020    HGB 13.5 02/14/2020    HCT 42.3 02/14/2020    MCV 99 02/14/2020    MCH 31.5 02/14/2020    MCHC 31.9 02/14/2020    RDW 13.9 02/14/2020     02/14/2020     BMP RESULTS:  Lab Results   Component Value Date     12/30/2020    POTASSIUM 4.8 12/30/2020    CHLORIDE 105 12/30/2020    CO2 25 12/30/2020    ANIONGAP 3 12/30/2020     (H) 12/30/2020    BUN 26 12/30/2020    CR 1.48 (H) 12/30/2020    GFRESTIMATED  47 (L) 12/30/2020    GFRESTBLACK 54 (L) 12/30/2020    REZA 9.4 12/30/2020        A1C RESULTS:  Lab Results   Component Value Date    A1C 11.3 (H) 12/05/2019       INR RESULTS:  Lab Results   Component Value Date    INR 1.01 12/20/2019    INR 1.03 12/05/2019     Thank you for allowing me to participate in the care of your patient.    Sincerely,     JUNE SANABRIA MD     [facility]

## 2021-01-20 ENCOUNTER — HOSPITAL ENCOUNTER (OUTPATIENT)
Dept: ULTRASOUND IMAGING | Facility: CLINIC | Age: 71
Discharge: HOME | End: 2021-01-20
Attending: INTERNAL MEDICINE | Admitting: INTERNAL MEDICINE
Payer: MEDICARE

## 2021-01-20 DIAGNOSIS — G47.33 OSA (OBSTRUCTIVE SLEEP APNEA): ICD-10-CM

## 2021-01-20 DIAGNOSIS — I10 BENIGN ESSENTIAL HYPERTENSION: ICD-10-CM

## 2021-01-20 DIAGNOSIS — E78.5 HYPERLIPIDEMIA LDL GOAL <70: ICD-10-CM

## 2021-01-20 DIAGNOSIS — I25.10 CORONARY ARTERY DISEASE INVOLVING NATIVE CORONARY ARTERY OF NATIVE HEART WITHOUT ANGINA PECTORIS: ICD-10-CM

## 2021-01-20 PROCEDURE — 93978 VASCULAR STUDY: CPT

## 2021-01-20 PROCEDURE — 93978 VASCULAR STUDY: CPT | Mod: 26 | Performed by: INTERNAL MEDICINE

## 2021-01-21 ENCOUNTER — TELEPHONE (OUTPATIENT)
Dept: CARDIOLOGY | Facility: CLINIC | Age: 71
End: 2021-01-21

## 2021-01-21 DIAGNOSIS — I71.40 ABDOMINAL AORTIC ANEURYSM (AAA) WITHOUT RUPTURE (H): Primary | ICD-10-CM

## 2021-01-21 NOTE — TELEPHONE ENCOUNTER
Dr. Santana has reviewed patient's US aorta/IVC/iliac duplex showing:  Technically difficult study due to bowel gas there cannot fully exclude Doppler color flow within the aneurysmal sac which can suggest endoleak.   Sac size measures 3.8 cm x 3.4 cm.   No prior imaging to compare maximum diameters.   Aortic graft and graft limbs are patent.     Dr. Santana would like CT angiogram abdomen/pelvis scheduled.  I have contacted [facility] CT imagining and this has been scheduled for 1/22/21 at 4:30.

## 2021-01-22 ENCOUNTER — HOSPITAL ENCOUNTER (OUTPATIENT)
Dept: CT IMAGING | Facility: CLINIC | Age: 71
Discharge: HOME | End: 2021-01-22
Attending: INTERNAL MEDICINE | Admitting: INTERNAL MEDICINE
Payer: MEDICARE

## 2021-01-22 DIAGNOSIS — I71.40 ABDOMINAL AORTIC ANEURYSM (AAA) WITHOUT RUPTURE (H): ICD-10-CM

## 2021-01-22 PROCEDURE — 250N000009 HC RX 250: Performed by: INTERNAL MEDICINE

## 2021-01-22 PROCEDURE — 74174 CTA ABD&PLVS W/CONTRAST: CPT | Mod: ME

## 2021-01-22 PROCEDURE — 250N000011 HC RX IP 250 OP 636: Performed by: INTERNAL MEDICINE

## 2021-01-22 RX ORDER — IOPAMIDOL 755 MG/ML
80 INJECTION, SOLUTION INTRAVASCULAR ONCE
Status: COMPLETED | OUTPATIENT
Start: 2021-01-22 | End: 2021-01-22

## 2021-01-22 RX ADMIN — SODIUM CHLORIDE 80 ML: 9 INJECTION, SOLUTION INTRAVENOUS at 16:31

## 2021-01-22 RX ADMIN — IOPAMIDOL 80 ML: 755 INJECTION, SOLUTION INTRAVENOUS at 16:31

## 2021-01-28 DIAGNOSIS — I10 BENIGN ESSENTIAL HYPERTENSION: ICD-10-CM

## 2021-01-28 DIAGNOSIS — I71.40 ABDOMINAL AORTIC ANEURYSM (AAA) WITHOUT RUPTURE (H): Primary | ICD-10-CM

## 2021-01-28 NOTE — RESULT ENCOUNTER NOTE
Dr. Santana has reviewed CTA of abdomen pelvis.  This test was done since his US of aorta/IVC/iliac showed aneurysmal sac of 3.8 x 3.4 cm.  Dr. Santana wanted to know if there was any leak within the aneurysm.  Results of CT showing no evidence for endoleak.  Will do annual US per Dr. Santana.  All this information was called to patient.  He has my direct number if he should have any questions.

## 2022-11-17 LAB
ALBUMIN SERPL-MCNC: 3.6 G/DL (ref 3.4–5)
ALP SERPL-CCNC: 130 U/L (ref 40–150)
ALT SERPL W P-5'-P-CCNC: 27 U/L (ref 0–70)
ANION GAP SERPL CALCULATED.3IONS-SCNC: 7 MMOL/L (ref 3–14)
AST SERPL W P-5'-P-CCNC: 22 U/L (ref 0–45)
ATRIAL RATE - MUSE: 85 BPM
BASOPHILS # BLD AUTO: 0 10E3/UL (ref 0–0.2)
BASOPHILS NFR BLD AUTO: 0 %
BILIRUB SERPL-MCNC: 0.7 MG/DL (ref 0.2–1.3)
BUN SERPL-MCNC: 23 MG/DL (ref 7–30)
CALCIUM SERPL-MCNC: 9.7 MG/DL (ref 8.5–10.1)
CHLORIDE BLD-SCNC: 107 MMOL/L (ref 94–109)
CO2 SERPL-SCNC: 21 MMOL/L (ref 20–32)
CREAT SERPL-MCNC: 1.4 MG/DL (ref 0.66–1.25)
DIASTOLIC BLOOD PRESSURE - MUSE: NORMAL MMHG
EOSINOPHIL # BLD AUTO: 0.2 10E3/UL (ref 0–0.7)
EOSINOPHIL NFR BLD AUTO: 2 %
ERYTHROCYTE [DISTWIDTH] IN BLOOD BY AUTOMATED COUNT: 13.2 % (ref 10–15)
GFR SERPL CREATININE-BSD FRML MDRD: 53 ML/MIN/1.73M2
GLUCOSE BLD-MCNC: 132 MG/DL (ref 70–99)
HCT VFR BLD AUTO: 50.2 % (ref 40–53)
HGB BLD-MCNC: 16.6 G/DL (ref 13.3–17.7)
HOLD SPECIMEN: NORMAL
HOLD SPECIMEN: NORMAL
IMM GRANULOCYTES # BLD: 0 10E3/UL
IMM GRANULOCYTES NFR BLD: 0 %
INTERPRETATION ECG - MUSE: NORMAL
LYMPHOCYTES # BLD AUTO: 2.4 10E3/UL (ref 0.8–5.3)
LYMPHOCYTES NFR BLD AUTO: 27 %
MCH RBC QN AUTO: 31.7 PG (ref 26.5–33)
MCHC RBC AUTO-ENTMCNC: 33.1 G/DL (ref 31.5–36.5)
MCV RBC AUTO: 96 FL (ref 78–100)
MONOCYTES # BLD AUTO: 0.8 10E3/UL (ref 0–1.3)
MONOCYTES NFR BLD AUTO: 9 %
NEUTROPHILS # BLD AUTO: 5.6 10E3/UL (ref 1.6–8.3)
NEUTROPHILS NFR BLD AUTO: 62 %
NRBC # BLD AUTO: 0 10E3/UL
NRBC BLD AUTO-RTO: 0 /100
P AXIS - MUSE: 62 DEGREES
PLATELET # BLD AUTO: 182 10E3/UL (ref 150–450)
POTASSIUM BLD-SCNC: 4.3 MMOL/L (ref 3.4–5.3)
PR INTERVAL - MUSE: 146 MS
PROT SERPL-MCNC: 7.5 G/DL (ref 6.8–8.8)
QRS DURATION - MUSE: 126 MS
QT - MUSE: 380 MS
QTC - MUSE: 452 MS
R AXIS - MUSE: -38 DEGREES
RBC # BLD AUTO: 5.23 10E6/UL (ref 4.4–5.9)
SODIUM SERPL-SCNC: 135 MMOL/L (ref 133–144)
SYSTOLIC BLOOD PRESSURE - MUSE: NORMAL MMHG
T AXIS - MUSE: 28 DEGREES
TROPONIN I SERPL HS-MCNC: 19 NG/L
VENTRICULAR RATE- MUSE: 85 BPM
WBC # BLD AUTO: 9 10E3/UL (ref 4–11)

## 2022-11-17 PROCEDURE — 250N000009 HC RX 250: Performed by: EMERGENCY MEDICINE

## 2022-11-17 PROCEDURE — 84484 ASSAY OF TROPONIN QUANT: CPT | Performed by: EMERGENCY MEDICINE

## 2022-11-17 PROCEDURE — 80053 COMPREHEN METABOLIC PANEL: CPT | Performed by: EMERGENCY MEDICINE

## 2022-11-17 PROCEDURE — 36415 COLL VENOUS BLD VENIPUNCTURE: CPT | Performed by: EMERGENCY MEDICINE

## 2022-11-17 PROCEDURE — 93005 ELECTROCARDIOGRAM TRACING: CPT

## 2022-11-17 PROCEDURE — 99285 EMERGENCY DEPT VISIT HI MDM: CPT | Mod: 25

## 2022-11-17 PROCEDURE — 85025 COMPLETE CBC W/AUTO DIFF WBC: CPT | Performed by: EMERGENCY MEDICINE

## 2022-11-17 PROCEDURE — 250N000013 HC RX MED GY IP 250 OP 250 PS 637: Performed by: EMERGENCY MEDICINE

## 2022-11-17 PROCEDURE — 85004 AUTOMATED DIFF WBC COUNT: CPT | Performed by: EMERGENCY MEDICINE

## 2022-11-17 PROCEDURE — 83690 ASSAY OF LIPASE: CPT | Performed by: EMERGENCY MEDICINE

## 2022-11-17 RX ADMIN — ALUMINUM HYDROXIDE, MAGNESIUM HYDROXIDE, AND SIMETHICONE 15 ML: 200; 200; 20 SUSPENSION ORAL at 19:36

## 2022-11-18 ENCOUNTER — HOSPITAL ENCOUNTER (EMERGENCY)
Facility: CLINIC | Age: 72
Discharge: HOME | End: 2022-11-18
Attending: EMERGENCY MEDICINE | Admitting: EMERGENCY MEDICINE
Payer: MEDICARE

## 2022-11-18 ENCOUNTER — APPOINTMENT (OUTPATIENT)
Dept: GENERAL RADIOLOGY | Facility: CLINIC | Age: 72
End: 2022-11-18
Attending: EMERGENCY MEDICINE
Payer: MEDICARE

## 2022-11-18 VITALS
TEMPERATURE: 97.7 F | BODY MASS INDEX: 30.88 KG/M2 | HEIGHT: 72 IN | SYSTOLIC BLOOD PRESSURE: 131 MMHG | HEART RATE: 91 BPM | RESPIRATION RATE: 16 BRPM | WEIGHT: 228 LBS | OXYGEN SATURATION: 94 % | DIASTOLIC BLOOD PRESSURE: 86 MMHG

## 2022-11-18 DIAGNOSIS — R07.9 CHEST PAIN, UNSPECIFIED TYPE: ICD-10-CM

## 2022-11-18 LAB
LIPASE SERPL-CCNC: 111 U/L (ref 73–393)
TROPONIN I SERPL HS-MCNC: 18 NG/L

## 2022-11-18 PROCEDURE — 84484 ASSAY OF TROPONIN QUANT: CPT | Performed by: EMERGENCY MEDICINE

## 2022-11-18 PROCEDURE — 36415 COLL VENOUS BLD VENIPUNCTURE: CPT | Performed by: EMERGENCY MEDICINE

## 2022-11-18 PROCEDURE — 250N000009 HC RX 250: Performed by: EMERGENCY MEDICINE

## 2022-11-18 PROCEDURE — 71046 X-RAY EXAM CHEST 2 VIEWS: CPT

## 2022-11-18 PROCEDURE — 250N000013 HC RX MED GY IP 250 OP 250 PS 637: Performed by: EMERGENCY MEDICINE

## 2022-11-18 RX ADMIN — ALUMINUM HYDROXIDE, MAGNESIUM HYDROXIDE, AND SIMETHICONE 30 ML: 200; 200; 20 SUSPENSION ORAL at 00:59

## 2022-11-18 ASSESSMENT — ENCOUNTER SYMPTOMS
COUGH: 0
FEVER: 0

## 2022-11-18 NOTE — ED TRIAGE NOTES
"Pt reports substernal/epigastric pain radiating to throat - \"heartburn\" like pain for past couple days. Pt reports lightheadedness and feeling \"wobbly\" since 1700. Hx of MI in 2018     Triage Assessment     Row Name 11/17/22 1906       Triage Assessment (Adult)    Airway WDL WDL       Respiratory WDL    Respiratory WDL WDL       Skin Circulation/Temperature WDL    Skin Circulation/Temperature WDL WDL       Cardiac WDL    Cardiac WDL X;chest pain       Peripheral/Neurovascular WDL    Peripheral Neurovascular WDL WDL       Cognitive/Neuro/Behavioral WDL    Cognitive/Neuro/Behavioral WDL WDL              "

## 2022-11-18 NOTE — ED PROVIDER NOTES
History   Chief Complaint:  Chest Pain       HPI   Huey Lucas is a 72 year old male with history of STEMI, CAD, and hypertension who presents with chest pain. The patient reports that yesterday morning(11/17) he had onset of chest pain. He states that it felt like a burning sensation like acid reflux. He reports he has taken acid reducer without relief in symptoms. He endorses concern because the last time he had not resolving GI symptoms he had cardiac issues and needed stenting. The patient states he is tolerating PO well. He denies any recent cough, fever, and congestion.     Review of Systems   Constitutional: Negative for fever.   HENT: Negative for congestion.    Respiratory: Negative for cough.    Cardiovascular: Positive for chest pain.   All other systems reviewed and are negative.        Allergies:  Levaquin    Medications:  Aspirin   Lipitor   Wellbutrin   Novolog   Levemir   Basaglar   Toprol   Nitrostat   Ozempic   Viagra   Depotestosterone   Brilinta   Ambien     Past Medical History:     STEMI   CAD   Hypertension   FREDI   AAA    Past Surgical History:    AAA repair   Arthrodesis ankle left   Knee arthroplasty bilateral   CV heart CATH   Bunionectomy   Spinal cord stimulator insertion/removal   Tonsillectomy and adenoidectomy    Bone graft   I & D     Family History:    Mother- lymphoma   Father- AAA    Social History:  The patient presents to the ED with his wife.   PCP: Tres Grullon     Physical Exam     Patient Vitals for the past 24 hrs:   BP Temp Temp src Pulse Resp SpO2 Height Weight   11/17/22 1911 -- -- -- -- -- 97 % -- --   11/17/22 1909 137/84 97.7  F (36.5  C) Oral 97 20 -- 1.829 m (6') 103.4 kg (228 lb)       Physical Exam  General: Appears well-developed and well-nourished.   Head: No signs of trauma.   CV: Normal rate and regular rhythm.    Resp: Effort normal and breath sounds normal. No respiratory distress.   GI: Soft. There is no tenderness.  No rebound or guarding.   Normal bowel sounds.  No CVA tenderness.  MSK: Normal range of motion. no edema. No Calf tenderness.  Neuro: The patient is alert and oriented. Speech normal.  Skin: Skin is warm and dry. No rash noted.   Psych: normal mood and affect. behavior is normal.       Emergency Department Course   ECG  ECG results from 11/18/22   EKG 12 lead     Value    Systolic Blood Pressure     Diastolic Blood Pressure     Ventricular Rate 85    Atrial Rate 85    NC Interval 146    QRS Duration 126        QTc 452    P Axis 62    R AXIS -38    T Axis 28    Interpretation ECG      Sinus rhythm  Left axis deviation  Right bundle branch block  Abnormal ECG  When compared with ECG of 14-FEB-2020 19:38,  Right bundle branch block is now Present  Confirmed by GENERATED REPORT, COMPUTER ([id number]),  Mira Parker ([id number]) on 11/17/2022 9:36:45 PM         Imaging:  XR Chest 2 Views   Final Result   IMPRESSION: Heart size is normal. Mildly elevated right hemidiaphragm. No evidence of pneumonia or pulmonary edema. No pneumothorax or pleural effusion.        Report per radiology    Laboratory:  Labs Ordered and Resulted from Time of ED Arrival to Time of ED Departure   COMPREHENSIVE METABOLIC PANEL - Abnormal       Result Value    Sodium 135      Potassium 4.3      Chloride 107      Carbon Dioxide (CO2) 21      Anion Gap 7      Urea Nitrogen 23      Creatinine 1.40 (*)     Calcium 9.7      Glucose 132 (*)     Alkaline Phosphatase 130      AST 22      ALT 27      Protein Total 7.5      Albumin 3.6      Bilirubin Total 0.7      GFR Estimate 53 (*)    TROPONIN I - Normal    Troponin I High Sensitivity 19     TROPONIN I - Normal    Troponin I High Sensitivity 18     LIPASE - Normal    Lipase 111     CBC WITH PLATELETS AND DIFFERENTIAL    WBC Count 9.0      RBC Count 5.23      Hemoglobin 16.6      Hematocrit 50.2      MCV 96      MCH 31.7      MCHC 33.1      RDW 13.2      Platelet Count 182      % Neutrophils 62      % Lymphocytes 27      %  Monocytes 9      % Eosinophils 2      % Basophils 0      % Immature Granulocytes 0      NRBCs per 100 WBC 0      Absolute Neutrophils 5.6      Absolute Lymphocytes 2.4      Absolute Monocytes 0.8      Absolute Eosinophils 0.2      Absolute Basophils 0.0      Absolute Immature Granulocytes 0.0      Absolute NRBCs 0.0          Procedures    Emergency Department Course:       Reviewed:  I reviewed nursing notes, vitals and past medical history    Assessments:  0200 I obtained history and examined the patient as noted above.     Interventions:  Medications   lidocaine (viscous) (XYLOCAINE) 2 % 7.5 mL, alum & mag hydroxide-simethicone (MAALOX) 7.5 mL GI Cocktail (15 mLs Oral Given 11/17/22 1936)   lidocaine (viscous) (XYLOCAINE) 2 % 15 mL, alum & mag hydroxide-simethicone (MAALOX) 15 mL GI Cocktail (30 mLs Oral Given 11/18/22 0059)      Disposition:  The patient was discharged to home.     Impression & Plan     Medical Decision Making:  Huey Lucas is a 72-year-old gentleman presents due to chest pain.  He reports feeling of burning consistent with reflux.  Given his cardiac history, it wass concerning so he did come to the hospital.  At the time of my evaluation he was feeling better.  EKG did not show any concerning ST segment changes.  Blood work including serial troponins over a number of hours continued to be negative.  In this setting I have a low suspicion for an acute MI.  Patient was discharged home with recommendation for supportive care and follow-up in clinic.  Return instructions were discussed.    Diagnosis:    ICD-10-CM    1. Chest pain, unspecified type  R07.9           Scribe Disclosure:  I, Caitlin Nelson, am serving as a scribe at 2:00 AM on 11/18/2022 to document services personally performed by Umesh James MD based on my observations and the provider's statements to me.            Umesh James MD  11/18/22 [id number]

## (undated) DEVICE — BNDG ELASTIC 4" DBL LENGTH UNSTERILE 6611-14

## (undated) DEVICE — CATH ANGIO SLCT 6FR DIA 100CML

## (undated) DEVICE — CATH LAUNCHER 6FR JL 4.0 LA6JL40

## (undated) DEVICE — STPL SKIN 35W 059037

## (undated) DEVICE — IMM LIMB ELEVATOR DC40-0203

## (undated) DEVICE — GLOVE PROTEXIS W/NEU-THERA 8.5  2D73TE85

## (undated) DEVICE — CAST PLASTER SPLINT 5X30" 7395

## (undated) DEVICE — NDL PERC ENTRY THINWALL 18GA 7.0" G00166

## (undated) DEVICE — INTRO SHEATH 4FRX10CM PINNACLE RSS402

## (undated) DEVICE — SUCTION CANISTER MEDIVAC LINER 3000ML W/LID 65651-530

## (undated) DEVICE — STPL SKIN 35W ROTATING HEAD PRW35

## (undated) DEVICE — SU VICRYL 3-0 PS-1 18" UND J683

## (undated) DEVICE — Device

## (undated) DEVICE — BLADE KNIFE SURG 15 371115

## (undated) DEVICE — CATH BALLOON EMERGE 3.5X15MM H7493918915350

## (undated) DEVICE — CATH LAUNCHER 6FR JR 4.0 LA6JR40

## (undated) DEVICE — CAST PADDING 4" UNSTERILE 9044

## (undated) DEVICE — GOWN XXLG REINFORCED 9071EL

## (undated) DEVICE — CATH GUIDING BLUE YELLOW PTFE XB3.5 6FRX100CM 67005400

## (undated) DEVICE — INTRO GLIDESHEATH SLENDER 6FR 10X45CM 60-1060

## (undated) DEVICE — BLADE SAW OSCILLATING STRYK MED 9.0X25X0.38MM 2296-003-111

## (undated) DEVICE — MANIFOLD KIT ANGIO AUTOMATED 014613

## (undated) DEVICE — CATH ANGIO INFINITI 3DRC 4FRX100CM 538476

## (undated) DEVICE — SU VICRYL 0 CT-1 27" J340H

## (undated) DEVICE — LINEN TOWEL PACK X5 5464

## (undated) DEVICE — SLEEVE TR BAND RADIAL COMPRESSION DEVICE 29CM XX-RF06L

## (undated) DEVICE — CATH LAUNCHER 6FR AL 2.0 LA6AL20

## (undated) DEVICE — DEFIB PRO-PADZ LVP LQD GEL ADULT 8900-2105-01

## (undated) DEVICE — PREP DURAPREP 26ML APL 8630

## (undated) DEVICE — CATH BALLOON EMERGE 3.0X15MM H7493918915300

## (undated) DEVICE — CATH ANGIO JUDKINS JL4 6FRX100CM INFINITI 534620T

## (undated) DEVICE — SU ETHILON 3-0 FS-1 18" 669H

## (undated) DEVICE — GLOVE PROTEXIS W/NEU-THERA 7.5  2D73TE75

## (undated) DEVICE — CATH BALLOON NC EMERGE 4.00X20MM H7493926720400

## (undated) DEVICE — TOTE ANGIO CORP PC15AT SAN32CC83O

## (undated) DEVICE — WIRE GUIDE 0.035"X260CM SAFE-T-J EXCHANGE G00517

## (undated) DEVICE — BNDG ELASTIC 4"X5YDS UNSTERILE 6611-40

## (undated) DEVICE — BLADE SAW SAGITTAL STRK SHORT 35.5X9X0.64MM 2108-148-000

## (undated) DEVICE — CATH US OD 5FR OD SEC 2.9FR EAGLE EYE PLATINUM 0.014 85900P

## (undated) DEVICE — INFL DVC KIT W/10CC NITRO IN4530

## (undated) DEVICE — WIRE GUIDE HI-TRQ PILOT 50 JTIP 0.014"X190CM 1010480-HJ

## (undated) DEVICE — INTRO SHEATH 7FRX10CM PINNACLE RSS702

## (undated) DEVICE — DRSG ABDOMINAL 07 1/2X8" 7197D

## (undated) DEVICE — CAST PADDING 4" STERILE 9044S

## (undated) DEVICE — 2.0 DRILL BIT

## (undated) DEVICE — SPONGE SURGIFOAM 100 1974

## (undated) DEVICE — GLOVE PROTEXIS W/NEU-THERA 8.0  2D73TE80

## (undated) DEVICE — CATH GUIDING BLUE YELLOW PTFE AL1 6FRX100CM 67003600

## (undated) DEVICE — GLOVE PROTEXIS MICRO 8.5  2D73PM85

## (undated) DEVICE — GUIDEWIRE VASC 0.014INX180CM RUNTHROUGH 25-1011

## (undated) DEVICE — CATH DIAG 4FR JL 4.5 538417

## (undated) DEVICE — KIT HAND CONTROL ANGIOTOUCH ACIST 65CM AT-P65

## (undated) DEVICE — INTRO SHEATH 6FRX10CM PINNACLE RSS602

## (undated) DEVICE — SOL WATER IRRIG 1000ML BOTTLE 2F7114

## (undated) DEVICE — WIRE GLIDE 0.035"X150CM VASC GR3506

## (undated) DEVICE — PACK EXTREMITY SOP15EXFSD

## (undated) DEVICE — GUIDEWIRE 180CM .035IN VASC STR FLXB

## (undated) DEVICE — SOL NACL 0.9% IRRIG 1000ML BOTTLE 07138-09

## (undated) DEVICE — DRAPE SHEET REV FOLD 3/4 9349

## (undated) DEVICE — DRSG GAUZE 4X4" 3033

## (undated) DEVICE — DRILL BIT MINI 85MM QC 1.5MM  310.15

## (undated) DEVICE — INTRODUCER SHEATH GREEN 6.5FRX11CM .038IN PSI-6F-11-038ACT

## (undated) DEVICE — CATH BALLOON EMERGE 2.0X15MM H7493918915200

## (undated) DEVICE — NDL 22GA 1.5"

## (undated) DEVICE — DRAPE COVER C-ARM SEAMLESS SNAP-KAP 03-KP26 LATEX FREE

## (undated) RX ORDER — FENTANYL CITRATE 50 UG/ML
INJECTION, SOLUTION INTRAMUSCULAR; INTRAVENOUS
Status: DISPENSED
Start: 2019-12-05

## (undated) RX ORDER — NITROGLYCERIN 5 MG/ML
VIAL (ML) INTRAVENOUS
Status: DISPENSED
Start: 2019-12-05

## (undated) RX ORDER — NITROGLYCERIN 20 MG/100ML
INJECTION INTRAVENOUS
Status: DISPENSED
Start: 2019-12-05

## (undated) RX ORDER — HYDROMORPHONE HYDROCHLORIDE 1 MG/ML
INJECTION, SOLUTION INTRAMUSCULAR; INTRAVENOUS; SUBCUTANEOUS
Status: DISPENSED
Start: 2019-03-04

## (undated) RX ORDER — HEPARIN SODIUM 200 [USP'U]/100ML
INJECTION, SOLUTION INTRAVENOUS
Status: DISPENSED
Start: 2019-12-20

## (undated) RX ORDER — FENTANYL CITRATE 50 UG/ML
INJECTION, SOLUTION INTRAMUSCULAR; INTRAVENOUS
Status: DISPENSED
Start: 2017-10-09

## (undated) RX ORDER — HEPARIN SODIUM 1000 [USP'U]/ML
INJECTION, SOLUTION INTRAVENOUS; SUBCUTANEOUS
Status: DISPENSED
Start: 2019-12-20

## (undated) RX ORDER — FENTANYL CITRATE 50 UG/ML
INJECTION, SOLUTION INTRAMUSCULAR; INTRAVENOUS
Status: DISPENSED
Start: 2019-12-20

## (undated) RX ORDER — HEPARIN SODIUM 1000 [USP'U]/ML
INJECTION, SOLUTION INTRAVENOUS; SUBCUTANEOUS
Status: DISPENSED
Start: 2019-12-05

## (undated) RX ORDER — LIDOCAINE HYDROCHLORIDE 10 MG/ML
INJECTION, SOLUTION EPIDURAL; INFILTRATION; INTRACAUDAL; PERINEURAL
Status: DISPENSED
Start: 2019-12-20

## (undated) RX ORDER — CEFAZOLIN SODIUM 1 G/3ML
INJECTION, POWDER, FOR SOLUTION INTRAMUSCULAR; INTRAVENOUS
Status: DISPENSED
Start: 2019-03-04

## (undated) RX ORDER — PROPOFOL 10 MG/ML
INJECTION, EMULSION INTRAVENOUS
Status: DISPENSED
Start: 2019-03-04

## (undated) RX ORDER — VERAPAMIL HYDROCHLORIDE 2.5 MG/ML
INJECTION, SOLUTION INTRAVENOUS
Status: DISPENSED
Start: 2019-12-20

## (undated) RX ORDER — FENTANYL CITRATE 50 UG/ML
INJECTION, SOLUTION INTRAMUSCULAR; INTRAVENOUS
Status: DISPENSED
Start: 2019-03-04

## (undated) RX ORDER — LIDOCAINE HYDROCHLORIDE 20 MG/ML
INJECTION, SOLUTION EPIDURAL; INFILTRATION; INTRACAUDAL; PERINEURAL
Status: DISPENSED
Start: 2019-03-04

## (undated) RX ORDER — NITROGLYCERIN 5 MG/ML
VIAL (ML) INTRAVENOUS
Status: DISPENSED
Start: 2019-12-20

## (undated) RX ORDER — OXYCODONE AND ACETAMINOPHEN 10; 325 MG/1; MG/1
TABLET ORAL
Status: DISPENSED
Start: 2017-10-09

## (undated) RX ORDER — OXYCODONE AND ACETAMINOPHEN 5; 325 MG/1; MG/1
TABLET ORAL
Status: DISPENSED
Start: 2019-03-04

## (undated) RX ORDER — PROPOFOL 10 MG/ML
INJECTION, EMULSION INTRAVENOUS
Status: DISPENSED
Start: 2017-10-09

## (undated) RX ORDER — CEFAZOLIN SODIUM 2 G/100ML
INJECTION, SOLUTION INTRAVENOUS
Status: DISPENSED
Start: 2019-03-04

## (undated) RX ORDER — OXYCODONE HCL 10 MG/1
TABLET, FILM COATED, EXTENDED RELEASE ORAL
Status: DISPENSED
Start: 2019-03-04

## (undated) RX ORDER — ONDANSETRON 2 MG/ML
INJECTION INTRAMUSCULAR; INTRAVENOUS
Status: DISPENSED
Start: 2019-03-04

## (undated) RX ORDER — LIDOCAINE HYDROCHLORIDE 20 MG/ML
INJECTION, SOLUTION EPIDURAL; INFILTRATION; INTRACAUDAL; PERINEURAL
Status: DISPENSED
Start: 2017-10-09

## (undated) RX ORDER — CEFAZOLIN SODIUM 2 G/100ML
INJECTION, SOLUTION INTRAVENOUS
Status: DISPENSED
Start: 2017-10-09

## (undated) RX ORDER — LIDOCAINE HYDROCHLORIDE AND EPINEPHRINE BITARTRATE 20; .01 MG/ML; MG/ML
INJECTION, SOLUTION SUBCUTANEOUS
Status: DISPENSED
Start: 2019-03-03

## (undated) RX ORDER — ONDANSETRON 2 MG/ML
INJECTION INTRAMUSCULAR; INTRAVENOUS
Status: DISPENSED
Start: 2017-10-09